# Patient Record
Sex: MALE | Race: WHITE | NOT HISPANIC OR LATINO | Employment: FULL TIME | URBAN - METROPOLITAN AREA
[De-identification: names, ages, dates, MRNs, and addresses within clinical notes are randomized per-mention and may not be internally consistent; named-entity substitution may affect disease eponyms.]

---

## 2017-02-06 ENCOUNTER — GENERIC CONVERSION - ENCOUNTER (OUTPATIENT)
Dept: OTHER | Facility: OTHER | Age: 67
End: 2017-02-06

## 2017-03-15 DIAGNOSIS — Z12.5 ENCOUNTER FOR SCREENING FOR MALIGNANT NEOPLASM OF PROSTATE: ICD-10-CM

## 2017-03-15 DIAGNOSIS — R91.1 SOLITARY PULMONARY NODULE: ICD-10-CM

## 2017-03-16 ENCOUNTER — APPOINTMENT (OUTPATIENT)
Dept: LAB | Facility: CLINIC | Age: 67
End: 2017-03-16
Payer: COMMERCIAL

## 2017-03-16 ENCOUNTER — TRANSCRIBE ORDERS (OUTPATIENT)
Dept: LAB | Facility: CLINIC | Age: 67
End: 2017-03-16

## 2017-03-16 DIAGNOSIS — Z12.5 ENCOUNTER FOR SCREENING FOR MALIGNANT NEOPLASM OF PROSTATE: ICD-10-CM

## 2017-03-16 LAB — PSA SERPL-MCNC: 1.2 NG/ML (ref 0–4)

## 2017-03-16 PROCEDURE — G0103 PSA SCREENING: HCPCS

## 2017-03-16 PROCEDURE — 36415 COLL VENOUS BLD VENIPUNCTURE: CPT

## 2017-03-20 ENCOUNTER — ALLSCRIPTS OFFICE VISIT (OUTPATIENT)
Dept: OTHER | Facility: OTHER | Age: 67
End: 2017-03-20

## 2017-04-05 ENCOUNTER — HOSPITAL ENCOUNTER (OUTPATIENT)
Dept: CT IMAGING | Facility: HOSPITAL | Age: 67
Discharge: HOME | End: 2017-04-05
Attending: THORACIC SURGERY (CARDIOTHORACIC VASCULAR SURGERY)
Payer: COMMERCIAL

## 2017-04-05 DIAGNOSIS — R91.1 SOLITARY PULMONARY NODULE: ICD-10-CM

## 2017-04-05 PROCEDURE — 71250 CT THORAX DX C-: CPT

## 2017-04-11 ENCOUNTER — ALLSCRIPTS OFFICE VISIT (OUTPATIENT)
Dept: OTHER | Facility: OTHER | Age: 67
End: 2017-04-11

## 2017-05-15 ENCOUNTER — GENERIC CONVERSION - ENCOUNTER (OUTPATIENT)
Dept: OTHER | Facility: OTHER | Age: 67
End: 2017-05-15

## 2017-07-28 ENCOUNTER — APPOINTMENT (OUTPATIENT)
Dept: LAB | Facility: CLINIC | Age: 67
End: 2017-07-28
Payer: COMMERCIAL

## 2017-07-28 ENCOUNTER — TRANSCRIBE ORDERS (OUTPATIENT)
Dept: LAB | Facility: CLINIC | Age: 67
End: 2017-07-28

## 2017-07-28 DIAGNOSIS — Z00.8 HEALTH EXAMINATION IN POPULATION SURVEY: Primary | ICD-10-CM

## 2017-07-28 DIAGNOSIS — Z00.8 HEALTH EXAMINATION IN POPULATION SURVEY: ICD-10-CM

## 2017-07-28 LAB
CHOLEST SERPL-MCNC: 190 MG/DL (ref 50–200)
EST. AVERAGE GLUCOSE BLD GHB EST-MCNC: 117 MG/DL
HBA1C MFR BLD: 5.7 % (ref 4.2–6.3)
HDLC SERPL-MCNC: 54 MG/DL (ref 40–60)
LDLC SERPL CALC-MCNC: 117 MG/DL (ref 0–100)
TRIGL SERPL-MCNC: 97 MG/DL

## 2017-07-28 PROCEDURE — 36415 COLL VENOUS BLD VENIPUNCTURE: CPT

## 2017-07-28 PROCEDURE — 80061 LIPID PANEL: CPT

## 2017-07-28 PROCEDURE — 83036 HEMOGLOBIN GLYCOSYLATED A1C: CPT

## 2017-07-30 ENCOUNTER — APPOINTMENT (EMERGENCY)
Dept: CT IMAGING | Facility: HOSPITAL | Age: 67
End: 2017-07-30
Payer: COMMERCIAL

## 2017-07-30 ENCOUNTER — HOSPITAL ENCOUNTER (EMERGENCY)
Facility: HOSPITAL | Age: 67
Discharge: HOME | End: 2017-07-30
Attending: EMERGENCY MEDICINE
Payer: COMMERCIAL

## 2017-07-30 VITALS
BODY MASS INDEX: 26.81 KG/M2 | SYSTOLIC BLOOD PRESSURE: 132 MMHG | DIASTOLIC BLOOD PRESSURE: 85 MMHG | OXYGEN SATURATION: 95 % | HEIGHT: 72 IN | RESPIRATION RATE: 18 BRPM | HEART RATE: 62 BPM | TEMPERATURE: 97.4 F | WEIGHT: 197.97 LBS

## 2017-07-30 DIAGNOSIS — N20.0 KIDNEY STONE ON LEFT SIDE: ICD-10-CM

## 2017-07-30 DIAGNOSIS — N20.1 URETEROLITHIASIS: ICD-10-CM

## 2017-07-30 DIAGNOSIS — N28.9 ACUTE RENAL INSUFFICIENCY: ICD-10-CM

## 2017-07-30 DIAGNOSIS — N23 RENAL COLIC ON RIGHT SIDE: Primary | ICD-10-CM

## 2017-07-30 LAB
ALBUMIN SERPL BCP-MCNC: 4 G/DL (ref 3.5–5)
ALP SERPL-CCNC: 88 U/L (ref 46–116)
ALT SERPL W P-5'-P-CCNC: 23 U/L (ref 12–78)
ANION GAP SERPL CALCULATED.3IONS-SCNC: 12 MMOL/L (ref 4–13)
AST SERPL W P-5'-P-CCNC: 18 U/L (ref 5–45)
BACTERIA UR QL AUTO: ABNORMAL /HPF
BASOPHILS # BLD AUTO: 0.02 THOUSANDS/ΜL (ref 0–0.1)
BASOPHILS NFR BLD AUTO: 0 % (ref 0–1)
BILIRUB SERPL-MCNC: 1 MG/DL (ref 0.2–1)
BILIRUB UR QL STRIP: NEGATIVE
BUN SERPL-MCNC: 19 MG/DL (ref 5–25)
CALCIUM SERPL-MCNC: 9.5 MG/DL (ref 8.3–10.1)
CHLORIDE SERPL-SCNC: 104 MMOL/L (ref 100–108)
CLARITY UR: CLEAR
CO2 SERPL-SCNC: 24 MMOL/L (ref 21–32)
COLOR UR: YELLOW
CREAT SERPL-MCNC: 1.47 MG/DL (ref 0.6–1.3)
EOSINOPHIL # BLD AUTO: 0 THOUSAND/ΜL (ref 0–0.61)
EOSINOPHIL NFR BLD AUTO: 0 % (ref 0–6)
ERYTHROCYTE [DISTWIDTH] IN BLOOD BY AUTOMATED COUNT: 13.9 % (ref 11.6–15.1)
GFR SERPL CREATININE-BSD FRML MDRD: 50 ML/MIN/1.73SQ M
GLUCOSE SERPL-MCNC: 116 MG/DL (ref 65–140)
GLUCOSE UR STRIP-MCNC: NEGATIVE MG/DL
HCT VFR BLD AUTO: 46.5 % (ref 36.5–49.3)
HGB BLD-MCNC: 15.6 G/DL (ref 12–17)
HGB UR QL STRIP.AUTO: ABNORMAL
KETONES UR STRIP-MCNC: ABNORMAL MG/DL
LEUKOCYTE ESTERASE UR QL STRIP: NEGATIVE
LYMPHOCYTES # BLD AUTO: 1.19 THOUSANDS/ΜL (ref 0.6–4.47)
LYMPHOCYTES NFR BLD AUTO: 10 % (ref 14–44)
MCH RBC QN AUTO: 28.5 PG (ref 26.8–34.3)
MCHC RBC AUTO-ENTMCNC: 33.5 G/DL (ref 31.4–37.4)
MCV RBC AUTO: 85 FL (ref 82–98)
MONOCYTES # BLD AUTO: 0.77 THOUSAND/ΜL (ref 0.17–1.22)
MONOCYTES NFR BLD AUTO: 6 % (ref 4–12)
MUCOUS THREADS UR QL AUTO: ABNORMAL
NEUTROPHILS # BLD AUTO: 10.31 THOUSANDS/ΜL (ref 1.85–7.62)
NEUTS SEG NFR BLD AUTO: 84 % (ref 43–75)
NITRITE UR QL STRIP: NEGATIVE
NON-SQ EPI CELLS URNS QL MICRO: ABNORMAL /HPF
PH UR STRIP.AUTO: 5 [PH] (ref 4.5–8)
PLATELET # BLD AUTO: 270 THOUSANDS/UL (ref 149–390)
PMV BLD AUTO: 10.6 FL (ref 8.9–12.7)
POTASSIUM SERPL-SCNC: 4.2 MMOL/L (ref 3.5–5.3)
PROT SERPL-MCNC: 7.8 G/DL (ref 6.4–8.2)
PROT UR STRIP-MCNC: NEGATIVE MG/DL
RBC # BLD AUTO: 5.47 MILLION/UL (ref 3.88–5.62)
RBC #/AREA URNS AUTO: ABNORMAL /HPF
SODIUM SERPL-SCNC: 140 MMOL/L (ref 136–145)
SP GR UR STRIP.AUTO: >=1.03 (ref 1–1.03)
UROBILINOGEN UR QL STRIP.AUTO: 0.2 E.U./DL
WBC # BLD AUTO: 12.29 THOUSAND/UL (ref 4.31–10.16)
WBC #/AREA URNS AUTO: ABNORMAL /HPF

## 2017-07-30 PROCEDURE — 80053 COMPREHEN METABOLIC PANEL: CPT | Performed by: EMERGENCY MEDICINE

## 2017-07-30 PROCEDURE — 96375 TX/PRO/DX INJ NEW DRUG ADDON: CPT

## 2017-07-30 PROCEDURE — 36415 COLL VENOUS BLD VENIPUNCTURE: CPT | Performed by: EMERGENCY MEDICINE

## 2017-07-30 PROCEDURE — 85025 COMPLETE CBC W/AUTO DIFF WBC: CPT | Performed by: EMERGENCY MEDICINE

## 2017-07-30 PROCEDURE — 96361 HYDRATE IV INFUSION ADD-ON: CPT

## 2017-07-30 PROCEDURE — 81001 URINALYSIS AUTO W/SCOPE: CPT | Performed by: EMERGENCY MEDICINE

## 2017-07-30 PROCEDURE — 74177 CT ABD & PELVIS W/CONTRAST: CPT

## 2017-07-30 PROCEDURE — 96374 THER/PROPH/DIAG INJ IV PUSH: CPT

## 2017-07-30 PROCEDURE — 99284 EMERGENCY DEPT VISIT MOD MDM: CPT

## 2017-07-30 RX ORDER — OXYCODONE HYDROCHLORIDE AND ACETAMINOPHEN 5; 325 MG/1; MG/1
1 TABLET ORAL EVERY 6 HOURS PRN
Qty: 20 TABLET | Refills: 0 | Status: SHIPPED | OUTPATIENT
Start: 2017-07-30 | End: 2017-08-09

## 2017-07-30 RX ORDER — KETOROLAC TROMETHAMINE 30 MG/ML
15 INJECTION, SOLUTION INTRAMUSCULAR; INTRAVENOUS ONCE
Status: COMPLETED | OUTPATIENT
Start: 2017-07-30 | End: 2017-07-30

## 2017-07-30 RX ORDER — ONDANSETRON 4 MG/1
4 TABLET, FILM COATED ORAL EVERY 8 HOURS PRN
Qty: 15 TABLET | Refills: 0 | Status: SHIPPED | OUTPATIENT
Start: 2017-07-30 | End: 2019-06-28

## 2017-07-30 RX ORDER — ONDANSETRON 2 MG/ML
4 INJECTION INTRAMUSCULAR; INTRAVENOUS ONCE
Status: COMPLETED | OUTPATIENT
Start: 2017-07-30 | End: 2017-07-30

## 2017-07-30 RX ORDER — TAMSULOSIN HYDROCHLORIDE 0.4 MG/1
0.4 CAPSULE ORAL
Qty: 7 CAPSULE | Refills: 0 | Status: SHIPPED | OUTPATIENT
Start: 2017-07-30 | End: 2019-08-20

## 2017-07-30 RX ORDER — MULTIVITAMIN
1 TABLET ORAL DAILY
COMMUNITY

## 2017-07-30 RX ORDER — MORPHINE SULFATE 4 MG/ML
4 INJECTION, SOLUTION INTRAMUSCULAR; INTRAVENOUS ONCE
Status: COMPLETED | OUTPATIENT
Start: 2017-07-30 | End: 2017-07-30

## 2017-07-30 RX ADMIN — MORPHINE SULFATE 4 MG: 4 INJECTION, SOLUTION INTRAMUSCULAR; INTRAVENOUS at 18:58

## 2017-07-30 RX ADMIN — ONDANSETRON 4 MG: 2 INJECTION INTRAMUSCULAR; INTRAVENOUS at 18:59

## 2017-07-30 RX ADMIN — KETOROLAC TROMETHAMINE 15 MG: 30 INJECTION, SOLUTION INTRAMUSCULAR at 18:59

## 2017-07-30 RX ADMIN — SODIUM CHLORIDE 1000 ML: 0.9 INJECTION, SOLUTION INTRAVENOUS at 19:06

## 2017-07-30 RX ADMIN — IOHEXOL 100 ML: 350 INJECTION, SOLUTION INTRAVENOUS at 19:54

## 2017-08-08 ENCOUNTER — ALLSCRIPTS OFFICE VISIT (OUTPATIENT)
Dept: OTHER | Facility: OTHER | Age: 67
End: 2017-08-08

## 2017-08-08 ENCOUNTER — LAB REQUISITION (OUTPATIENT)
Dept: LAB | Facility: HOSPITAL | Age: 67
End: 2017-08-08
Payer: COMMERCIAL

## 2017-08-08 DIAGNOSIS — Z87.442 PERSONAL HISTORY OF URINARY CALCULI: ICD-10-CM

## 2017-08-08 DIAGNOSIS — K76.0 FATTY (CHANGE OF) LIVER, NOT ELSEWHERE CLASSIFIED: ICD-10-CM

## 2017-08-08 PROCEDURE — 82360 CALCULUS ASSAY QUANT: CPT | Performed by: INTERNAL MEDICINE

## 2017-08-21 LAB
CA PHOS MFR STONE: 3 %
COLOR STONE: NORMAL
COM MFR STONE: 97 %
COMMENT-STONE3: NORMAL
COMPOSITION: NORMAL
LABORATORY COMMENT REPORT: NORMAL
NIDUS STONE QL: NORMAL
PHOTO: NORMAL
SIZE STONE: NORMAL MM
STONE ANALYSIS-IMP: NORMAL
WT STONE: 6 MG

## 2017-08-25 ENCOUNTER — GENERIC CONVERSION - ENCOUNTER (OUTPATIENT)
Dept: OTHER | Facility: OTHER | Age: 67
End: 2017-08-25

## 2018-01-09 NOTE — RESULT NOTES
Message   blood test results are back  vitamin D is in same range at 27 5 (normal is 30 or greater)  if patient has been taking 2,000 IU vitamin D over the counter daily - recommend to increase dose to 4,000 IU daily for next 3 months   kidney, electrolyte blood test results look fine  cholesterol improved to 184 & diabetes screening test/HGA1C really improved  HGA1C was 6 8% & is now 5 4%! recommend to continue with current treatment plan & keep it up with the exercise!  let me know if questions     Verified Results  (1) VITAMIN D 25-HYDROXY 14Dxg1884 11:14AM Monik Reyes   TW Order Number: [id number]  TW Order Number: [id number]     Test Name Result Flag Reference   VIT D 25-HYDROX 27 5 ng/mL L 30 0-100 0   This assay is a certified procedure of the CDC Vitamin D Standardization Certification Program (VDSCP)     Deficiency <20ng/ml   Insufficiency 20-30ng/ml   Sufficient  ng/ml     *Patients undergoing fluorescein dye angiography may retain small amounts of fluorescein in the body for 48-72 hours post procedure  Samples containing fluorescein can produce falsely elevated Vitamin D values  If the patient had this procedure, a specimen should be resubmitted post fluorescein clearance  (1) BASIC METABOLIC PROFILE 51CCW0694 11:14AM Shanna Dee Order Number: [id number]  TW Order Number: [id number] Order Number: [id number]     Test Name Result Flag Reference   GLUCOSE,RANDM 102 mg/dL     If the patient is fasting, the ADA then defines impaired fasting glucose as > 100 mg/dL and diabetes as > or equal to 123 mg/dL     SODIUM 138 mmol/L  136-145   POTASSIUM 4 5 mmol/L  3 5-5 3   CHLORIDE 104 mmol/L  100-108   CARBON DIOXIDE 28 mmol/L  21-32   ANION GAP (CALC) 6 mmol/L  4-13   BLOOD UREA NITROGEN 13 mg/dL  5-25   CREATININE 0 93 mg/dL  0 60-1 30   Standardized to IDMS reference method   CALCIUM 9 2 mg/dL  8 3-10 1   eGFR Non- >60 0 ml/min/1 73sq m   [facility] Disease Education Program recommendations are as follows:  GFR calculation is accurate only with a steady state creatinine  Chronic Kidney disease less than 60 ml/min/1 73 sq  meters  Kidney failure less than 15 ml/min/1 73 sq  meters  (1) LIPID PANEL, FASTING 67MUK6093 11:14AM Veronica Vega    Order Number: [id number]  TW Order Number: [id number] Order Number: [id number]     Test Name Result Flag Reference   CHOLESTEROL 184 mg/dL     HDL,DIRECT 52 mg/dL  40-60   Specimen collection should occur prior to Metamizole administration due to the potential for falsely depressed results  LDL CHOLESTEROL CALCULATED 109 mg/dL H 0-100   Triglyceride:         Normal              <150 mg/dl       Borderline High    150-199 mg/dl       High               200-499 mg/dl       Very High          >499 mg/dl  Cholesterol:         Desirable        <200 mg/dl      Borderline High  200-239 mg/dl      High             >239 mg/dl  HDL Cholesterol:        High    >59 mg/dL      Low     <41 mg/dL  LDL CALCULATED:    This screening LDL is a calculated result  It does not have the accuracy of the Direct Measured LDL in the monitoring of patients with hyperlipidemia and/or statin therapy  Direct Measure LDL (PKO635) must be ordered separately in these patients  TRIGLYCERIDES 114 mg/dL  <=150   Specimen collection should occur prior to N-Acetylcysteine or Metamizole administration due to the potential for falsely depressed results  (1) HEMOGLOBIN A1C 23Tqp9637 11:14AM Veronica Vega    Order Number: [id number]  TW Order Number: [id number]     Test Name Result Flag Reference   HEMOGLOBIN A1C 5 4 %  4 2-6 3   EST  AVG   GLUCOSE 108 mg/dl

## 2018-01-10 NOTE — MISCELLANEOUS
Message  pt's wife arrived for appt today in office - was exposed to scabies and having symptoms/pruritic rash on hands    wife requested that her  and son be treated(who are both patients of mine) as she resides with them and may have exposed them as well    plan - permethrin ordered, discussed infectio control practices as well with Sheree Wang      Plan  Exposure to scabies    · Permethrin 5 % External Cream; MASSAGE INTO SKIN FROM HEAD TO SOLES  OF FEET  [address] OFF AFTER 8-14 HOURS  REPEAT IN 1 WEEK    Signatures   Electronically signed by : Js Blackburn DO; Feb 6 2017  4:45PM EST                       (Author)

## 2018-01-11 NOTE — PROGRESS NOTES
Assessment    1  Encounter for preventive health examination (V70 0) (Z00 00)   2  Vitamin D deficiency (268 9) (E55 9)   3  Participant in health and wellness plan (V49 89) (Z78 9)   4  Screen for colon cancer (V76 51) (Z12 11)    Plan   Health Maintenance    · We encourage all of our patients to exercise regularly  30 minutes of exercise or physical  activity five or more days a week is recommended for children and adults ;  Status:Complete;   Done: 16CFB0842   · 1 - Екатерина Stein RD, Marianela Simons (Dietitian) Physician Referral  Consult  Status: Canceled -  Scheduling  Care Summary provided  : Yes   · (1) BASIC METABOLIC PROFILE; Status:Resulted - Requires Verification;   Done:  48FEX0944 11:14AM  Health Maintenance, Participant in health and wellness plan    · (1) LIPID PANEL, FASTING; Status:Resulted - Requires Verification;   Done: 46WGI5966  11:14AM  Participant in health and wellness plan    · (1) HEMOGLOBIN A1C; Status:Resulted - Requires Verification;   Done: 45BKW1903  11:14AM  Screen for colon cancer    · COLONOSCOPY; Status:Active; Requested for:12Rxt5948;   Vitamin D deficiency    · (1) VITAMIN D 25-HYDROXY; Status:Resulted - Requires Verification;   Done: 17XMY9396  11:14AM    2 - *[facility] ( COLORECTAL SURGERY, GASTROENTEROLOGY) Physician Referral  Consult - 64 y/o M never had colonoscopy   screen for colon cancer  Status: Hold For - Scheduling  Requested for: 81VCB4688  Ordered; For: Screen for colon cancer;  Ordered By: Mateo Mata  Performed:   Due: 16RKM5631     Discussion/Summary  Impression: health maintenance visit  Currently, he eats a healthy diet and has an adequate exercise regimen  Prostate cancer screening: prostate cancer screening is current  Colorectal cancer screening: colonoscopy has been ordered  Screening lab work includes glucose, lipid profile and 25-hydroxyvitamin D  The patient declines immunizations  Patient discussion: discussed with the patient       1  fasting blood work  2  keep up good work on [employer] loss/exercise and lifestyle changes  3  return for annual check up once a year or sooner if questions  4  colonoscopy  5  recommend shingles vaccine, let me know if you would like to receive this vaccine  The patient was counseled regarding instructions for management, patient and family education, impressions  Chief Complaint  patient is here for a wellness visit  History of Present Illness  HM, Adult Male: The patient is being seen for a health maintenance evaluation  The last health maintenance visit was 1 year(s) ago  Social History: Household members include spouse  He is   Work status: working full time and occupation:   The patient has never smoked cigarettes  The patient has no concerns about alcohol abuse  General Health: The patient's health since the last visit is described as good  He denies vision problems  He denies hearing loss  Immunizations status: not up to date The patient needs the following immunization(s): zoster vaccine  Lifestyle:  He consumes a diverse and healthy diet  He does not have any weight concerns  He exercises regularly  He exercises 3 or more times per week for 20-25 minutes per session  Exercise includes running/jogging  Screening: cancer screening reviewed and updated  metabolic screening reviewed and updated  HPI: 64 y/o M new to me here for annual check up/physical    as above - takes no medications except OTC vitamins  denies any past medical hx  running several times per week for exercise & modified diet as well - lost 20+ lbs in recent months!  declines shingles vaccine and flu vaccine - "I don't get sick"    never had colonoscopy - willing to proceed with referral to complete  history of lung nodule - s/p Thoracic surgery eval  due for f/u CT chest next year - patient aware    denies any other complaints      Review of Systems    Constitutional: no fever  Cardiovascular: no chest pain  Respiratory: no shortness of breath  Gastrointestinal: no abdominal pain  Genitourinary: no incontinence  Psychiatric: no depression  ROS reviewed  Active Problems    1  Benign prostatic hypertrophy (600 00) (N40 0)   2  Impacted cerumen of left ear (380 4) (H61 22)   3  Lung nodule (793 11) (R91 1)   4  Migraine headache (346 90) (G43 909)   5  Special screening examination for neoplasm of prostate (V76 44) (Z12 5)    Past Medical History    · History of renal calculi (V13 01) (N28 433)    Surgical History    · History of Biopsy Lymph Node   · History of Lithotomy   · History of Lymphatic Surgery    Family History  Mother    · Family history of Colon cancer   · Family history of migraine headaches (V17 2) (Z82 0)  Father    · Family history of lung cancer (V16 1) (Z80 1)  Family History    · Denied: Family history of cardiac disorder   · Denied: Family history of diabetes mellitus    Social History    · Alcohol use (V49 89) (Z78 9)   · Occasionally beer 1x per week   · Currently working   ·    · Denied: Drug use (305 90) (F19 90)   · Exercises moderately less than 3 times a week   ·    · Never a smoker    Current Meds   1  Mens 50+ Multi Vitamin/Min Oral Tablet; Take 1 tablet daily Recorded   2  Vitamin B-12 1000 MCG Oral Tablet; TAKE 3 TABLET Daily; Therapy: (Bj Worrell) to Recorded   3  Vitamin D3 2000 UNIT Oral Capsule; Takes 1 capsule daily; Therapy: (Recorded:76Lyu0436) to Recorded    Allergies    1  No Known Drug Allergies    Vitals   Recorded: 28Iyt9801 10:37AM   Temperature 97 9 F   Heart Rate 75   Respiration 18   Systolic 951   Diastolic 68   Height 6 ft    Weight 198 lb    BMI Calculated 26 85   BSA Calculated 2 12   O2 Saturation 98     Physical Exam    Constitutional   General appearance: No acute distress, well appearing and well nourished  Eyes   Pupils and irises: Equal, round, reactive to light      Ears, Nose, Mouth, and Throat   Otoscopic examination: Tympanic membranes translucent with normal light reflex  Canals patent without erythema  mild cerumen in Right ear canal without impaction  Oropharynx: Normal with no erythema, edema, exudate or lesions  Pulmonary   Respiratory effort: No increased work of breathing or signs of respiratory distress  Auscultation of lungs: Clear to auscultation  Cardiovascular   Auscultation of heart: Normal rate and rhythm, normal S1 and S2, no murmurs  Examination of extremities for edema and/or varicosities: Normal   no edema  Abdomen   Abdomen: Non-tender, no masses  Lymphatic   Palpation of lymph nodes in neck: No lymphadenopathy  Psychiatric   Judgment and insight: Normal     Mood and affect: Normal        Results/Data  (1) VITAMIN D 25-HYDROXY 96Cki7832 11:14AM Aspen Echevarria    Order Number: [id number]  TW Order Number: [id number]     Test Name Result Flag Reference   VIT D 25-HYDROX 27 5 ng/mL L 30 0-100 0   This assay is a certified procedure of the CDC Vitamin D Standardization Certification Program (VDSCP)     Deficiency <20ng/ml   Insufficiency 20-30ng/ml   Sufficient  ng/ml     *Patients undergoing fluorescein dye angiography may retain small amounts of fluorescein in the body for 48-72 hours post procedure  Samples containing fluorescein can produce falsely elevated Vitamin D values  If the patient had this procedure, a specimen should be resubmitted post fluorescein clearance  (1) BASIC METABOLIC PROFILE 89NKN1033 11:14AM Jesus Manuel Robert Order Number: [id number]  TW Order Number: [id number] Order Number: [id number]     Test Name Result Flag Reference   GLUCOSE,RANDM 102 mg/dL     If the patient is fasting, the ADA then defines impaired fasting glucose as > 100 mg/dL and diabetes as > or equal to 123 mg/dL     SODIUM 138 mmol/L  136-145   POTASSIUM 4 5 mmol/L  3 5-5 3   CHLORIDE 104 mmol/L  100-108   CARBON DIOXIDE 28 mmol/L  21-32   ANION GAP (CALC) 6 mmol/L  4-13   BLOOD UREA NITROGEN 13 mg/dL  5-25   CREATININE 0 93 mg/dL  0 60-1 30   Standardized to IDMS reference method   CALCIUM 9 2 mg/dL  8 3-10 1   eGFR Non-African American      >60 0 ml/min/1 73sq m   [facility] Disease Education Program recommendations are as follows:  GFR calculation is accurate only with a steady state creatinine  Chronic Kidney disease less than 60 ml/min/1 73 sq  meters  Kidney failure less than 15 ml/min/1 73 sq  meters  (1) LIPID PANEL, FASTING 11MEA3230 11:14AM Lynn Lugo    Order Number: [id number]  TW Order Number: [id number] Order Number: [id number]     Test Name Result Flag Reference   CHOLESTEROL 184 mg/dL     HDL,DIRECT 52 mg/dL  40-60   Specimen collection should occur prior to Metamizole administration due to the potential for falsely depressed results  LDL CHOLESTEROL CALCULATED 109 mg/dL H 0-100   Triglyceride:         Normal              <150 mg/dl       Borderline High    150-199 mg/dl       High               200-499 mg/dl       Very High          >499 mg/dl  Cholesterol:         Desirable        <200 mg/dl      Borderline High  200-239 mg/dl      High             >239 mg/dl  HDL Cholesterol:        High    >59 mg/dL      Low     <41 mg/dL  LDL CALCULATED:    This screening LDL is a calculated result  It does not have the accuracy of the Direct Measured LDL in the monitoring of patients with hyperlipidemia and/or statin therapy  Direct Measure LDL (BNV737) must be ordered separately in these patients  TRIGLYCERIDES 114 mg/dL  <=150   Specimen collection should occur prior to N-Acetylcysteine or Metamizole administration due to the potential for falsely depressed results  (1) HEMOGLOBIN A1C 09Iwg9744 11:14AM Lynn Lugo   TW Order Number: [id number]  TW Order Number: [id number]     Test Name Result Flag Reference   HEMOGLOBIN A1C 5 4 %  4 2-6 3   EST  AVG  GLUCOSE 108 mg/dl       PHQ-2 Adult Depression Screening 86Lpz1027 10:58AM Adelfo Olson     Test Name Result Flag Reference   PHQ-2 Adult Depression Score 0     Over the last two weeks, how often have you been bothered by any of the following problems?   Little interest or pleasure in doing things: Not at all - 0  Feeling down, depressed, or hopeless: Not at all - 0   PHQ-2 Adult Depression Screening Negative         Future Appointments    Date/Time Provider Specialty Site   07/21/2017 08:00 AM Adelfo Olson DO Internal Medicine [facility] INTERNAL MED   03/08/2017 10:00 AM Cindy Banks, [address] [facility] Urology [address] [place] [address] Extension     Signatures   Electronically signed by : Natasha Lira DO; Jul 15 2016  4:38PM EST                       (Author)

## 2018-01-13 VITALS
SYSTOLIC BLOOD PRESSURE: 122 MMHG | DIASTOLIC BLOOD PRESSURE: 78 MMHG | BODY MASS INDEX: 27.22 KG/M2 | OXYGEN SATURATION: 97 % | TEMPERATURE: 95.7 F | WEIGHT: 201 LBS | HEIGHT: 72 IN | HEART RATE: 61 BPM

## 2018-01-14 VITALS
WEIGHT: 202.13 LBS | HEART RATE: 72 BPM | HEIGHT: 72 IN | SYSTOLIC BLOOD PRESSURE: 130 MMHG | BODY MASS INDEX: 27.38 KG/M2 | DIASTOLIC BLOOD PRESSURE: 84 MMHG

## 2018-01-16 NOTE — RESULT NOTES
Verified Results  (1) CALCULI, RENAL 22Bil1888 08:08PM Bernardo Neighbor     Test Name Result Flag Reference   COLOR Ozzy Sutherland     SIZE 2x2x2 mm     STONE WEIGHT 6 0 mg     COMPOSITION Comment     Percentage (Represents the % composition)   CA OXALATE,MONOHYDR  97 %     CALCIUM PHOSPHATE 03 %     NIDUS Comment     Nidus composed of Calcium oxalate monohydrate  PLEASE NOTE Comment     Calculi report with photograph will follow via computer, mail or   delivery  COMMENT-STONE3 Comment     Physician questions regarding Calculi Analysis contact [facility] at:  [phone]  PHOTO Comment     Photograph will follow under separate cover  Performed at:  [address]  [id number]  : Robel Connolly MD, Phone:  [phone]   RENAL STONE DISCLAIMER Comment     This test was developed and its performance characteristics  determined by [facility]  It has not been cleared or approved  by the Food and Drug Administration

## 2018-01-16 NOTE — MISCELLANEOUS
Message   Recorded as Task   Date: 05/15/2017 08:41 AM, Created By: Reynaldo Pierre   Task Name: Medical Complaint Callback   Assigned To: Tommy Hermosillo   Regarding Patient: Ifeanyi Howard, Status: In Progress   Comment:    Janina Jreez - 15 May 2017 8:41 AM     TASK CREATED  Caller: NE, Spouse; Medical Complaint  PT 'S WIFE WAS SEEN IN THE ER AND DIAGNOSED WITH SCABIES  WOULD LIKE MED ALSO  CAN Maritza Suazo- [phone]   Tommy Hermosillo - 15 May 2017 10:54 AM     TASK REPLIED TO: Previously Assigned To [employer],Team  if Palmira Whyte lives with Hortencia Oconnor, reasonable to treat damari    which pharmacy should I send to  Merrill Nolasco - 15 May 2017 12:16 PM     TASK IN PROGRESS   Ally Garcia - 15 May 2017 12:18 PM     TASK EDITED  Noah Client        Plan  Exposure to scabies    · From  Permethrin 5 % External Cream MASSAGE INTO SKIN FROM HEAD  TO SOLES OF FEET  [address] OFF AFTER 8-14 HOURS  REPEAT IN 1 WEEK To  Permethrin 5 % External Cream MASSAGE INTO SKIN FROM HEAD TO SOLES OF  FEET  [address] OFF AFTER 8-14 HOURS    Signatures   Electronically signed by : Willy Guevara DO; May 15 2017 12:28PM EST                       (Author)

## 2018-01-17 NOTE — PROGRESS NOTES
Assessment    1  Encounter for preventive health examination (V70 0) (Z00 00)   2  Fatty liver (571 8) (K76 0)   · seen on CT in ER 7/2017   3  Impacted cerumen of right ear (380 4) (H61 21)   4  History of renal calculi (V13 01) (Z87 442)    Plan   Fatty liver    · (1) HEPATIC FUNCTION PANEL; Status:Canceled;   PMH: History of renal calculi    · (1) CALCULI, RENAL; Status:Active; Requested for:12Vts7598;     2 - *EYVAZ&REILLYCOL ( COLORECTAL SURGERY, GASTROENTEROLOGY) Co-Management  62 y/o M never had colonoscopy - for screening colonoscopy  Status: Hold For - Scheduling  Requested for: 21Aia9708  Ordered; For: Screen for colon cancer;  Ordered By: Taye Najera  Performed:   Due: 38INJ1024     Discussion/Summary  Currently, he eats a healthy diet and has an adequate exercise regimen  Prostate cancer screening: prostate cancer screening is current  Colorectal cancer screening: the risks and benefits of colorectal cancer screening were discussed and colonoscopy has been ordered  Screening lab work includes glucose, lipid profile and labs already done  The risks and benefits of immunizations were discussed and patient declines immunizations  Advice and education were given regarding nutrition and weight loss  Patient discussion: discussed with the patient  The patient was counseled regarding instructions for management, patient and family education, impressions  Possible side effects of new medications were reviewed with the patient/guardian today  The treatment plan was reviewed with the patient/guardian  The patient/guardian understands and agrees with the treatment plan      Chief Complaint  Pt is here for a physical      History of Present Illness  HM, Adult Male: The patient is being seen for a health maintenance evaluation  The last health maintenance visit was 1 year(s) ago  Social History: Household members include spouse and adult children  He is   Work status: working full time   The patient has never smoked cigarettes  General Health: The patient's health since the last visit is described as good  He denies vision problems  He denies hearing loss  Immunizations status: not up to date   pt declined all vaccines  Lifestyle:  He consumes a diverse and healthy diet  He does not have any weight concerns  He exercises regularly  He does not use tobacco    Screening: cancer screening reviewed and updated  metabolic screening reviewed and current  HPI: 60 y/o M here for annual physical    as above - takes no medications on regular basis except vitamins    lost weight over last 1-2 years, has been eating better and exercising, runs few days per week at least for 20-30 mins each time    declines all vaccines including shingles and future pneumonia vaccines(after age of 72)  had wellness BW which we reviewed    hx of fatty liver - seen on CT in 2015 and again last month when he went to [address] [place] [address] for flank pain and dx'd kidney stone  fatty liver has improved from previous CT in 2015 per radiology report  he passed stone few days ago and is feeling much better  he saved the stone and has never had previous stones(1x previous) analyzed for type of nephrolithiasis    working FT as   never had colonoscopy - was referred last year but he did not go, we discussed risk/benefit of colonoscopy again today including mortality benefit and he agreed to call for appt to meet provider who completes the test    no other concerns or complaints      Review of Systems    Constitutional: no fever  Eyes: no eyesight problems  ENT: no sore throat  Cardiovascular: no chest pain  Respiratory: no shortness of breath  Gastrointestinal: no abdominal pain  Genitourinary: no dysuria  Musculoskeletal: no arthralgias  Integumentary: no rashes  Neurological: no confusion  Psychiatric: no depression  Endocrine: no feelings of weakness  ROS reviewed  Active Problems    1   Benign prostatic hypertrophy (600 00) (N40 0)   2  Encounter for special screening examination for neoplasm of prostate (V76 44) (Z12 5)   3  Exposure to scabies (V01 89) (Z20 89)   4  Fatty liver (571 8) (K76 0)   5  Lung nodule (793 11) (R91 1)   6  Migraine headache (346 90) (G43 909)   7  Participant in health and wellness plan (V49 89) (Z78 9)   8  Screen for colon cancer (V76 51) (Z12 11)   9  Special screening examination for neoplasm of prostate (V76 44) (Z12 5)   10  Vitamin D deficiency (268 9) (E55 9)    Past Medical History    · History of renal calculi (V13 01) (W83 954)    Surgical History    · History of Biopsy Lymph Node   · History of Lithotomy   · History of Lymphatic Surgery    Family History  Mother    · Family history of Colon cancer   · Family history of migraine headaches (V17 2) (Z82 0)  Father    · Family history of lung cancer (V16 1) (Z80 1)  Family History    · Denied: Family history of cardiac disorder   · Denied: Family history of diabetes mellitus    Social History    · Currently working   ·    · Denied: Drug use (305 90) (F19 90)   · Exercises moderately less than 3 times a week   ·    · Never a smoker   · Occasional alcohol use    Current Meds   1  Co Q 10 100 MG Oral Capsule Recorded   2  Mens 50+ Multi Vitamin/Min Oral Tablet; Take 1 tablet daily Recorded   3  Vitamin B-12 1000 MCG Oral Tablet; TAKE 3 TABLET Daily; Therapy: ([phone]) to Recorded   4  Vitamin D3 2000 UNIT Oral Capsule; Takes 1 capsule daily; Therapy: (Recorded:58Ejb5854) to Recorded    Allergies    1  No Known Drug Allergies    Vitals   Recorded: 35Lwa3520 08:56AM   Temperature 98 4 F   Heart Rate 84   Respiration 18   Systolic 587   Diastolic 80   Height 6 ft    Weight 200 lb 6 oz   BMI Calculated 27 18   BSA Calculated 2 13   O2 Saturation 98     Physical Exam    Constitutional   General appearance: No acute distress, well appearing and well nourished      Eyes   Pupils and irises: Equal, round, reactive to light     Ears, Nose, Mouth, and Throat   Otoscopic examination: Abnormal   The right tympanic membrane was obscured completely by cerumen  The left tympanic membrane was normal  The right external canal was normal  The left external canal was normal    Oropharynx: Normal with no erythema, edema, exudate or lesions  Pulmonary   Respiratory effort: No increased work of breathing or signs of respiratory distress  Auscultation of lungs: Clear to auscultation  Cardiovascular   Auscultation of heart: Normal rate and rhythm, normal S1 and S2, no murmurs  Examination of extremities for edema and/or varicosities: Normal     Abdomen   Abdomen: Non-tender, no masses  Psychiatric   Judgment and insight: Normal     Mood and affect: Normal        Results/Data  PHQ-2 Adult Depression Screening [id number] 08:59AM User, Grasprs     Test Name Result Flag Reference   PHQ-2 Adult Depression Score 0     Over the last two weeks, how often have you been bothered by any of the following problems? Little interest or pleasure in doing things: Not at all - 0  Feeling down, depressed, or hopeless: Not at all - 0   PHQ-2 Adult Depression Screening Negative       (1) HEMOGLOBIN A1C 08Tug5205 10:34AM Williams Ganser     Test Name Result Flag Reference   HEMOGLOBIN A1C 5 7 %  4 2-6 3   EST  AVG  GLUCOSE 117 mg/dl       (1) HEMOGLOBIN A1C 12VOE4808 10:34AM Williams Ganser     Test Name Result Flag Reference   HEMOGLOBIN A1C 5 7 %  4 2-6 3   EST  AVG  GLUCOSE 117 mg/dl       (1) LIPID PANEL, FASTING 09Ysb3804 10:34AM Williams Ganser     Test Name Result Flag Reference   CHOLESTEROL 190 mg/dL     HDL,DIRECT 54 mg/dL  40-60   Specimen collection should occur prior to Metamizole administration due to the potential for falsely depressed results     LDL CHOLESTEROL CALCULATED 117 mg/dL H 0-100   This is a fasting blood test  Water,black tea or black  coffee only after 9:00pm the night before test  Drink 2 glasses of water the morning of test         Triglyceride:         Normal              <150 mg/dl       Borderline High    150-199 mg/dl       High               200-499 mg/dl       Very High          >499 mg/dl  Cholesterol:         Desirable        <200 mg/dl      Borderline High  200-239 mg/dl      High             >239 mg/dl  HDL Cholesterol:        High    >59 mg/dL      Low     <41 mg/dL  LDL CALCULATED:    This screening LDL is a calculated result  It does not have the accuracy of the Direct Measured LDL in the monitoring of patients with hyperlipidemia and/or statin therapy  Direct Measure LDL (TRP226) must be ordered separately in these patients  TRIGLYCERIDES 97 mg/dL  <=150   Specimen collection should occur prior to N-Acetylcysteine or Metamizole administration due to the potential for falsely depressed results  Procedure    Procedure: cerumen removal    Indication: tympanic membrane(s) could not be visualized and cerumen impaction in the right ear  Procedure Note: The procedure was performed by Avery Castellanos MA  A otoscope was placed in the ear canal(s) to visualize the ear canal debris  The ear was cleaned by using warm water irrigation  The procedure was successful  Post-Procedure:   Patient Status: the patient tolerated the procedure well  Complications: there were no complications  Patient instructions: dry ear precautions  Follow-up as needed        Provider Comments  Provider Comments:   we discussed fatty liver and its sequelae including permanent liver damage    we also discussed its treatment including weight loss and exercise/controlling cholesterol    LFTs checked during recent ER visit and WNL    f/u 12 mos or prn      Future Appointments    Date/Time Provider Specialty Site   03/20/2018 09:00 AM Prabha Botello Urology [facility] [address] [place] [address]   06/19/2018 08:30 AM Hill Moore DO Internal Medicine [facility] INTER[facility] MED     Verified Results  (1) HEMOGLOBIN A1C [id number] 10: 34AM Gaetano Anton     Test Name Result Flag Reference   HEMOGLOBIN A1C 5 7 %  4 2-6 3   EST  AVG  GLUCOSE 117 mg/dl       (1) LIPID PANEL, FASTING 67Eke9842 10:34AM Gaetano Anton     Test Name Result Flag Reference   CHOLESTEROL 190 mg/dL     HDL,DIRECT 54 mg/dL  40-60   Specimen collection should occur prior to Metamizole administration due to the potential for falsely depressed results  LDL CHOLESTEROL CALCULATED 117 mg/dL H 0-100   This is a fasting blood test  Water,black tea or black  coffee only after 9:00pm the night before test  Drink 2 glasses of water the morning of test         Triglyceride:         Normal              <150 mg/dl       Borderline High    150-199 mg/dl       High               200-499 mg/dl       Very High          >499 mg/dl  Cholesterol:         Desirable        <200 mg/dl      Borderline High  200-239 mg/dl      High             >239 mg/dl  HDL Cholesterol:        High    >59 mg/dL      Low     <41 mg/dL  LDL CALCULATED:    This screening LDL is a calculated result  It does not have the accuracy of the Direct Measured LDL in the monitoring of patients with hyperlipidemia and/or statin therapy  Direct Measure LDL (GHF838) must be ordered separately in these patients  TRIGLYCERIDES 97 mg/dL  <=150   Specimen collection should occur prior to N-Acetylcysteine or Metamizole administration due to the potential for falsely depressed results         Signatures   Electronically signed by : Yen Payne DO; Aug  8 2017  4:55PM EST                       (Author)

## 2018-01-22 VITALS
WEIGHT: 200.38 LBS | DIASTOLIC BLOOD PRESSURE: 80 MMHG | RESPIRATION RATE: 18 BRPM | HEIGHT: 72 IN | HEART RATE: 84 BPM | SYSTOLIC BLOOD PRESSURE: 118 MMHG | BODY MASS INDEX: 27.14 KG/M2 | TEMPERATURE: 98.4 F | OXYGEN SATURATION: 98 %

## 2018-03-15 ENCOUNTER — TRANSCRIBE ORDERS (OUTPATIENT)
Dept: LAB | Facility: CLINIC | Age: 68
End: 2018-03-15

## 2018-03-15 ENCOUNTER — APPOINTMENT (OUTPATIENT)
Dept: LAB | Facility: CLINIC | Age: 68
End: 2018-03-15
Payer: COMMERCIAL

## 2018-03-15 DIAGNOSIS — Z12.5 ENCOUNTER FOR SCREENING FOR MALIGNANT NEOPLASM OF PROSTATE: ICD-10-CM

## 2018-03-15 LAB — PSA SERPL-MCNC: 1 NG/ML (ref 0–4)

## 2018-03-15 PROCEDURE — 36415 COLL VENOUS BLD VENIPUNCTURE: CPT

## 2018-03-15 PROCEDURE — G0103 PSA SCREENING: HCPCS

## 2018-03-17 DIAGNOSIS — Z12.5 ENCOUNTER FOR SCREENING FOR MALIGNANT NEOPLASM OF PROSTATE: ICD-10-CM

## 2018-03-20 ENCOUNTER — OFFICE VISIT (OUTPATIENT)
Dept: UROLOGY | Facility: CLINIC | Age: 68
End: 2018-03-20
Payer: COMMERCIAL

## 2018-03-20 VITALS
HEIGHT: 72 IN | WEIGHT: 204 LBS | DIASTOLIC BLOOD PRESSURE: 80 MMHG | HEART RATE: 72 BPM | SYSTOLIC BLOOD PRESSURE: 122 MMHG | BODY MASS INDEX: 27.63 KG/M2

## 2018-03-20 DIAGNOSIS — Z87.442 HISTORY OF NEPHROLITHIASIS: ICD-10-CM

## 2018-03-20 DIAGNOSIS — Z12.5 PROSTATE CANCER SCREENING: Primary | ICD-10-CM

## 2018-03-20 PROCEDURE — 99213 OFFICE O/P EST LOW 20 MIN: CPT | Performed by: PHYSICIAN ASSISTANT

## 2018-03-20 RX ORDER — LANOLIN ALCOHOL/MO/W.PET/CERES
3 CREAM (GRAM) TOPICAL DAILY
COMMUNITY

## 2018-03-20 RX ORDER — ACETAMINOPHEN 160 MG
TABLET,DISINTEGRATING ORAL DAILY
COMMUNITY

## 2018-03-20 NOTE — PROGRESS NOTES
1  Prostate cancer screening     2  History of nephrolithiasis         Assessment and plan:       1  Prostate cancer screening  - PSA is stable with benign examination  Patient will continue with routine prostate cancer screening annually with his primary care provider   - patient has a relatively small prostate on examination without any irritative lower urinary tract symptoms   - I encouraged him to continue with proper hydration and dietary modifications in order to prevent future stone formation  He is aware to contact us in the future with any signs and symptoms of a passing stone  All questions answered  Patient will follow-up with urology on an as-needed basis  Fany Boogie PA-C      Chief Complaint     Prostate cancer screening    History of Present Illness     Irene Newell is a 74 y o  male patient of Dr Jonna Bustillos with a history of BPH presenting for 1 year follow up and routine prostate cancer screening  He is not on any medications for his prostate or bladder  Patient is comfortable with his urination  He feels that he is a fair stream, feels empty after urination, and nocturia 2 times nightly  Denies any urgency, hematuria, incontinence, or dysuria  Patient did have an episode of right lower quadrant pain radiating to the back in July 2017 which prompted an emergency department visit  A CT obtained at that time revealed a 2 mm right UVJ calculus with mild upstream hydronephrosis  Patient was able to successfully pass the stone on his own without any surgical intervention  Stone analysis reveals calcium oxalate and origin  Patient had a recent PSA of 1 0 (3/15/18), previously 1 2 last year        Laboratory     Lab Results   Component Value Date    CREATININE 1 47 (H) 07/30/2017       Lab Results   Component Value Date    PSA 1 0 03/15/2018    PSA 1 2 03/16/2017    PSA 1 0 02/29/2016     Stone analysis   Order: [id number]   Status:  Final result   Visible to patient:  No (Not Released)   Next appt: Today at 09:00 AM in Urology (Aroldo Eldridge PA-C)   Dx:  Personal history of urinary calculi    Ref Range & Units 8/8/17 12:00 AM   COLOR  Ortiz Potter    Size mm 2x2x2    Stone Weight mg 6 0    Composition  Comment    Comments: Percentage (Represents the % composition)   Ca Oxalate,Monohydr  % 97    CALCIUM PHOSPHATE % 03    Nidus  Comment    Comments: Nidus composed of Calcium oxalate monohydrate  STONE COMMENT  Comment    Comments: Physician questions regarding Calculi Analysis contact [employer] at:   [phone]  Please note  Comment    Comments: Calculi report with photograph will follow via computer, mail or    delivery  Disclaimer  Comment    Comments: This test was developed and its performance characteristics   determined by [employer]  It has not been cleared or approved   by the Food and Drug Administration  Photo  Comment    Comments: Photograph will follow under separate cover  Narrative     Performed at: [address]  [id number]  : Jasmyne Barcenas MD, Phone:  [phone]      Specimen Collected: 08/08/17   Last Resulted: 08/21/17  8:08 PM                  Review of Systems     Review of Systems   Constitutional: Negative for activity change, appetite change, chills, diaphoresis, fatigue, fever and unexpected weight change  Respiratory: Negative for chest tightness and shortness of breath  Cardiovascular: Negative for chest pain, palpitations and leg swelling  Gastrointestinal: Negative for abdominal distention, abdominal pain, constipation, diarrhea, nausea and vomiting  Genitourinary: Negative for decreased urine volume, difficulty urinating, dysuria, enuresis, flank pain, frequency, genital sores, hematuria and urgency  Musculoskeletal: Negative for back pain, gait problem and myalgias  Skin: Negative for color change, pallor, rash and wound     Psychiatric/Behavioral: Negative for behavioral problems  The patient is not nervous/anxious  Allergies     No Known Allergies    Physical Exam     Physical Exam   Constitutional: He is oriented to person, place, and time  He appears well-developed and well-nourished  No distress  HENT:   Head: Normocephalic and atraumatic  Eyes: Conjunctivae are normal    Neck: Normal range of motion  No tracheal deviation present  Pulmonary/Chest: Effort normal    Genitourinary:   Genitourinary Comments: Prostate: 20g, smooth, symmetric, no nodules   Neurological: He is alert and oriented to person, place, and time  Skin: Skin is warm and dry  No rash noted  He is not diaphoretic  No erythema  Psychiatric: He has a normal mood and affect   His behavior is normal          Vital Signs     Vitals:    03/20/18 0910   BP: 122/80   Pulse: 72   Weight: 92 5 kg (204 lb)   Height: 6' (1 829 m)         Current Medications       Current Outpatient Prescriptions:     Cholecalciferol (VITAMIN D3) 2000 units capsule, Take by mouth, Disp: , Rfl:     Coenzyme Q10 (CO Q 10) 100 MG CAPS, Take by mouth, Disp: , Rfl:     cyanocobalamin (VITAMIN B-12) 1,000 mcg tablet, Take 3 tablets by mouth daily, Disp: , Rfl:     Multiple Vitamin (MULTIVITAMIN) tablet, Take 1 tablet by mouth daily, Disp: , Rfl:     VVYEUI-WEHYP-OJOREO-FA-FISHOIL PO, Take by mouth daily, Disp: , Rfl:     ondansetron (ZOFRAN) 4 mg tablet, Take 1 tablet by mouth every 8 (eight) hours as needed for nausea or vomiting, Disp: 15 tablet, Rfl: 0    tamsulosin (FLOMAX) 0 4 mg, Take 1 capsule by mouth daily with dinner for 7 days, Disp: 7 capsule, Rfl: 0      Active Problems     Patient Active Problem List   Diagnosis    Benign prostatic hyperplasia    Prostate cancer screening    History of nephrolithiasis         Past Medical History     Past Medical History:   Diagnosis Date    Renal calculi          Surgical History     Past Surgical History:   Procedure Laterality Date    LITHOTRIPSY      LYMPH NODE BIOPSY      TONSILLECTOMY           Family History     Family History   Problem Relation Age of Onset    Colon cancer Mother     Migraines Mother     Lung cancer Father          Social History     Social History       Radiology     CT ABDOMEN AND PELVIS WITH IV CONTRAST     INDICATION:  61-year-old man presenting with right lower quadrant abdominal pain      COMPARISON: Abdominal CT 4/9/2015      TECHNIQUE:  CT examination of the abdomen and pelvis was performed  Reformatted images were created in axial, sagittal, and coronal planes        Radiation dose length product (DLP) for this visit:  587 mGy-cm   This examination, like all CT scans performed in the [facility], was performed utilizing techniques to minimize radiation dose exposure, including the use of iterative   reconstruction and automated exposure control      IV Contrast:  100 mL of iohexol (OMNIPAQUE)       Enteric Contrast:  Enteric contrast was not administered      FINDINGS:     ABDOMEN     LOWER CHEST: 4 mm left lower lobe pulmonary nodule is unchanged since 4/9/2015, radiographically benign  Rounded opacity within the lingula measuring approximately 1 1 cm is unchanged since 4/9/2015, possibly scarring or rounded atelectasis  Given   greater than 2 years stability, no follow-up is required      LIVER/BILIARY TREE:  Liver is diffusely decreased in density consistent with fatty change, which is improved since 2015  No CT evidence of suspicious hepatic mass  Normal hepatic contours  No biliary dilatation      GALLBLADDER:  No calcified gallstones  No pericholecystic inflammatory change      SPLEEN:  Unremarkable      PANCREAS:  Unremarkable      ADRENAL GLANDS:  Unremarkable      KIDNEYS/URETERS:    2 mm obstructing calculus at the right vesicoureteral junction causing mild upstream hydroureteronephrosis  3 mm nonobstructing left kidney calculus   No left hydronephrosis      STOMACH AND BOWEL:  There is colonic diverticulosis without evidence of acute diverticulitis      APPENDIX:  A normal appendix was visualized      ABDOMINOPELVIC CAVITY:  No ascites or free intraperitoneal air  No lymphadenopathy      VESSELS:  Unremarkable for patient's age      PELVIS     REPRODUCTIVE ORGANS:  The prostate is enlarged      URINARY BLADDER:  Unremarkable      ABDOMINAL WALL/INGUINAL REGIONS:  Unremarkable      OSSEOUS STRUCTURES:  No acute fracture or destructive osseous lesion      IMPRESSION:  1   2 mm calculus at the right vesicoureteral junction causing mild upstream hydroureteronephrosis      2   Other urolithiasis as above  3   Colonic diverticulosis without acute diverticulitis  4   Hepatic steatosis

## 2018-06-21 ENCOUNTER — TRANSCRIBE ORDERS (OUTPATIENT)
Dept: LAB | Facility: CLINIC | Age: 68
End: 2018-06-21

## 2018-06-21 ENCOUNTER — APPOINTMENT (OUTPATIENT)
Dept: LAB | Facility: CLINIC | Age: 68
End: 2018-06-21

## 2018-06-21 DIAGNOSIS — Z00.8 HEALTH EXAMINATION IN POPULATION SURVEY: Primary | ICD-10-CM

## 2018-06-21 DIAGNOSIS — Z00.8 HEALTH EXAMINATION IN POPULATION SURVEY: ICD-10-CM

## 2018-06-21 LAB
CHOLEST SERPL-MCNC: 197 MG/DL (ref 50–200)
EST. AVERAGE GLUCOSE BLD GHB EST-MCNC: 117 MG/DL
HBA1C MFR BLD: 5.7 % (ref 4.2–6.3)
HDLC SERPL-MCNC: 47 MG/DL (ref 40–60)
LDLC SERPL CALC-MCNC: 125 MG/DL (ref 0–100)
NONHDLC SERPL-MCNC: 150 MG/DL
TRIGL SERPL-MCNC: 126 MG/DL

## 2018-06-21 PROCEDURE — 80061 LIPID PANEL: CPT

## 2018-06-21 PROCEDURE — 36415 COLL VENOUS BLD VENIPUNCTURE: CPT | Performed by: PREVENTIVE MEDICINE

## 2018-06-21 PROCEDURE — 83036 HEMOGLOBIN GLYCOSYLATED A1C: CPT | Performed by: PREVENTIVE MEDICINE

## 2018-06-27 ENCOUNTER — OFFICE VISIT (OUTPATIENT)
Dept: INTERNAL MEDICINE CLINIC | Facility: CLINIC | Age: 68
End: 2018-06-27
Payer: COMMERCIAL

## 2018-06-27 VITALS
SYSTOLIC BLOOD PRESSURE: 120 MMHG | WEIGHT: 200 LBS | OXYGEN SATURATION: 97 % | DIASTOLIC BLOOD PRESSURE: 80 MMHG | TEMPERATURE: 98.2 F | HEIGHT: 72 IN | HEART RATE: 72 BPM | BODY MASS INDEX: 27.09 KG/M2 | RESPIRATION RATE: 18 BRPM

## 2018-06-27 DIAGNOSIS — R79.89 LOW VITAMIN D LEVEL: ICD-10-CM

## 2018-06-27 DIAGNOSIS — K76.0 FATTY LIVER: ICD-10-CM

## 2018-06-27 DIAGNOSIS — Z12.11 SCREEN FOR COLON CANCER: ICD-10-CM

## 2018-06-27 DIAGNOSIS — Z00.00 WELLNESS EXAMINATION: Primary | ICD-10-CM

## 2018-06-27 DIAGNOSIS — L98.9 NON-HEALING SKIN LESION: ICD-10-CM

## 2018-06-27 DIAGNOSIS — H61.21 IMPACTED CERUMEN, RIGHT EAR: ICD-10-CM

## 2018-06-27 DIAGNOSIS — Z11.59 NEED FOR HEPATITIS C SCREENING TEST: ICD-10-CM

## 2018-06-27 PROCEDURE — 69209 REMOVE IMPACTED EAR WAX UNI: CPT | Performed by: INTERNAL MEDICINE

## 2018-06-27 PROCEDURE — 1160F RVW MEDS BY RX/DR IN RCRD: CPT | Performed by: INTERNAL MEDICINE

## 2018-06-27 PROCEDURE — 99397 PER PM REEVAL EST PAT 65+ YR: CPT | Performed by: INTERNAL MEDICINE

## 2018-06-27 NOTE — PROGRESS NOTES
Assessment/Plan:     Diagnoses and all orders for this visit:    Wellness examination    Low vitamin D level  Comments:  on daily OTC supplement, re-check level now  Orders:  -     Vitamin D 25 hydroxy    Non-healing skin lesion  Comments:  suspect AK, r/o skin cancer -> derm referral & SPF 30 or greater  Orders:  -     Ambulatory referral to Dermatology; Future    Screen for colon cancer  Comments:  advised on benefits of colonoscopy, wife(works in healthcare) has been advising him to complete this as well, ref provided again today  Orders:  -     Ambulatory referral to Colorectal Surgery; Future    Impacted cerumen, right ear  Comments:  ear irrigation today    Fatty liver  Comments:  improved on last CT A/P 2017, re-check LFTs  Orders:  -     Comprehensive metabolic panel; Future    Need for hepatitis C screening test  Comments:  never had before, ordered  Orders:  -     Hepatitis C antibody; Future    Other orders  -     Omega-3 Fatty Acids (FISH OIL) 1200 MG CPDR; Take by mouth  -     Ear cerumen removal          Subjective:      Patient ID: Nabeel Pyle is a 74 y o  male  HPI    Here for annual physical   Completed BW for insurance/wellness  Having return of right ear wax buildup with discomfort  Has non-healing skin lesion with associated flakiness on right temple for last 6 months  Did not complete colonoscopy, never called despite being given referral   Refusing all vaccines including pneumonia vaccine  Exercises regularly - runs 20-30 mins each time and denies CP/SOB/RICH  We discussed lowering cholesterol in diet  No other complaints  History reviewed  No pertinent past medical history  Vitals:    06/27/18 0750   BP: 120/80   Pulse: 72   Resp: 18   Temp: 98 2 °F (36 8 °C)   SpO2: 97%   Weight: 90 7 kg (200 lb)   Height: 6' (1 829 m)     Body mass index is 27 12 kg/m²      Current Outpatient Prescriptions:     Cholecalciferol (VITAMIN D3) 2000 units capsule, Take by mouth, Disp: , Rfl:    Coenzyme Q10 (CO Q 10) 100 MG CAPS, Take by mouth, Disp: , Rfl:     cyanocobalamin (VITAMIN B-12) 1,000 mcg tablet, Take 3 tablets by mouth daily, Disp: , Rfl:     Multiple Vitamin (MULTIVITAMIN) tablet, Take 1 tablet by mouth daily, Disp: , Rfl:     Omega-3 Fatty Acids (FISH OIL) 1200 MG CPDR, Take by mouth, Disp: , Rfl:     ondansetron (ZOFRAN) 4 mg tablet, Take 1 tablet by mouth every 8 (eight) hours as needed for nausea or vomiting, Disp: 15 tablet, Rfl: 0    tamsulosin (FLOMAX) 0 4 mg, Take 1 capsule by mouth daily with dinner for 7 days, Disp: 7 capsule, Rfl: 0  No Known Allergies      Review of Systems   Constitutional: Negative for fever  HENT: Negative for ear discharge  Eyes: Negative for visual disturbance  Respiratory: Negative for shortness of breath  Cardiovascular: Negative for chest pain  Genitourinary: Negative for difficulty urinating  Musculoskeletal: Negative for arthralgias  Allergic/Immunologic: Negative for environmental allergies  Neurological: Negative for headaches  Psychiatric/Behavioral: Negative for dysphoric mood  Objective:      /80   Pulse 72   Temp 98 2 °F (36 8 °C)   Resp 18   Ht 6' (1 829 m)   Wt 90 7 kg (200 lb)   SpO2 97%   BMI 27 12 kg/m²          Physical Exam   Constitutional: He appears well-developed and well-nourished  HENT:   Head: Normocephalic and atraumatic  Right Ear: Tympanic membrane and external ear normal    Left Ear: Tympanic membrane and external ear normal    Mouth/Throat: Oropharynx is clear and moist    Right ear cerumen impaction   Eyes: Conjunctivae are normal  Pupils are equal, round, and reactive to light  Cardiovascular: Normal rate, regular rhythm and normal heart sounds  No murmur heard  Pulmonary/Chest: Effort normal and breath sounds normal  He has no wheezes  He has no rales  Abdominal: Soft  Bowel sounds are normal  There is no tenderness  Musculoskeletal: He exhibits no edema  Lymphadenopathy:     He has no cervical adenopathy  Neurological: He is alert  Skin: Rash noted  1x0 5cm flat, erythematous skin lesion on right temple with flakiness to skin   Psychiatric: He has a normal mood and affect  His behavior is normal    Vitals reviewed  Results for orders placed or performed in visit on 06/21/18   Lipid panel   Result Value Ref Range    Cholesterol 197 50 - 200 mg/dL    Triglycerides 126 <=150 mg/dL    HDL, Direct 47 40 - 60 mg/dL    LDL Calculated 125 (H) 0 - 100 mg/dL    Non-HDL-Chol (CHOL-HDL) 150 mg/dl     Ear cerumen removal  Date/Time: 6/27/2018 8:28 AM  Performed by: Alisa Veliz  Authorized by: Alisa Veliz     Patient location:  Clinic  Other Assisting Provider: Yes (comment) (Merrill Bolanos MA)    Consent:     Consent obtained:  Verbal    Consent given by:  Patient    Risks discussed:  Incomplete removal and pain    Alternatives discussed:  No treatment and alternative treatment  Universal protocol:     Patient identity confirmed:  Verbally with patient  Procedure details:     Local anesthetic:  None    Location:  R ear    Procedure type: irrigation      Approach:  External  Post-procedure details:     Complication:  None    Hearing quality:  Normal    Patient tolerance of procedure:   Tolerated well, no immediate complications

## 2018-06-27 NOTE — PATIENT INSTRUCTIONS
1  Dermatology appointment  2  Use SPF 30 or greater when in direct sun exposure  3  Blood work  4  Colonoscopy    Non-Alcoholic Fatty Liver Disease   WHAT YOU NEED TO KNOW:   Non-alcoholic fatty liver disease (NAFLD) is a buildup of fat in your liver from a condition other than alcoholism  DISCHARGE INSTRUCTIONS:   Medicines:   · Medicines  may be given to manage your blood sugar or cholesterol levels  · Take your medicine as directed  Contact your healthcare provider if you think your medicine is not helping or if you have side effects  Tell him or her if you are allergic to any medicine  Keep a list of the medicines, vitamins, and herbs you take  Include the amounts, and when and why you take them  Bring the list or the pill bottles to follow-up visits  Carry your medicine list with you in case of an emergency  Follow up with your healthcare provider as directed: You may need to return for more tests  You may also be referred to a specialist  Write down your questions so you remember to ask them during your visits  Manage NAFLD:   · Maintain a healthy weight  Ask your healthcare provider how much you should weigh  Ask him to help you create a weight loss plan if you are overweight  · Exercise  Aerobic exercise 3 times a week for 20 to 45 minutes can help decrease fat buildup in your liver  Examples are cycling, brisk walking, or jogging  Ask your healthcare provider about the best exercise plan for you  · Eat healthy foods  Examples are vegetables, fruit, whole-grain breads, low-fat dairy products, beans, lean meats, and fish  Foods low in simple carbohydrates, high fructose corn syrup, and trans fat may help decrease fat buildup in your liver  · Do not drink alcohol  Alcohol may make NAFLD worse and harm your liver  Contact your healthcare provider if:   · You have increased pain or swelling in your abdomen  · You feel more tired than usual     · You bruise or bleed easily      · Your skin or the whites of your eyes look yellow  · You have questions or concerns about your condition or care  Seek care immediately or call 911 if:   · You have shortness of breath  · You have trouble thinking clearly or are confused  · You feel lightheaded or faint  · You have shaking, chills, and a fever  © 2017 [employer] Information is for End User's use only and may not be sold, redistributed or otherwise used for commercial purposes  All illustrations and images included in CareNotes® are the copyrighted property of A D [employer] M [employer]  or [employer]  The above information is an  only  It is not intended as medical advice for individual conditions or treatments  Talk to your doctor, nurse or pharmacist before following any medical regimen to see if it is safe and effective for you

## 2019-06-28 ENCOUNTER — OFFICE VISIT (OUTPATIENT)
Dept: INTERNAL MEDICINE CLINIC | Facility: CLINIC | Age: 69
End: 2019-06-28
Payer: COMMERCIAL

## 2019-06-28 VITALS
BODY MASS INDEX: 27.44 KG/M2 | RESPIRATION RATE: 18 BRPM | SYSTOLIC BLOOD PRESSURE: 118 MMHG | HEIGHT: 72 IN | TEMPERATURE: 96.2 F | DIASTOLIC BLOOD PRESSURE: 80 MMHG | HEART RATE: 65 BPM | OXYGEN SATURATION: 98 % | WEIGHT: 202.6 LBS

## 2019-06-28 DIAGNOSIS — Z13.6 ENCOUNTER FOR LIPID SCREENING FOR CARDIOVASCULAR DISEASE: ICD-10-CM

## 2019-06-28 DIAGNOSIS — R79.89 LOW VITAMIN D LEVEL: ICD-10-CM

## 2019-06-28 DIAGNOSIS — Z12.11 SCREEN FOR COLON CANCER: ICD-10-CM

## 2019-06-28 DIAGNOSIS — Z11.59 NEED FOR HEPATITIS C SCREENING TEST: ICD-10-CM

## 2019-06-28 DIAGNOSIS — Z12.5 PROSTATE CANCER SCREENING: ICD-10-CM

## 2019-06-28 DIAGNOSIS — Z87.442 HISTORY OF NEPHROLITHIASIS: ICD-10-CM

## 2019-06-28 DIAGNOSIS — Z00.00 WELLNESS EXAMINATION: Primary | ICD-10-CM

## 2019-06-28 DIAGNOSIS — Z13.220 ENCOUNTER FOR LIPID SCREENING FOR CARDIOVASCULAR DISEASE: ICD-10-CM

## 2019-06-28 DIAGNOSIS — Z23 NEED FOR TDAP VACCINATION: ICD-10-CM

## 2019-06-28 PROCEDURE — 99397 PER PM REEVAL EST PAT 65+ YR: CPT | Performed by: INTERNAL MEDICINE

## 2019-06-28 PROCEDURE — 1160F RVW MEDS BY RX/DR IN RCRD: CPT | Performed by: INTERNAL MEDICINE

## 2019-07-05 ENCOUNTER — APPOINTMENT (OUTPATIENT)
Dept: LAB | Facility: CLINIC | Age: 69
End: 2019-07-05
Payer: COMMERCIAL

## 2019-07-05 DIAGNOSIS — Z13.6 ENCOUNTER FOR LIPID SCREENING FOR CARDIOVASCULAR DISEASE: ICD-10-CM

## 2019-07-05 DIAGNOSIS — Z13.220 ENCOUNTER FOR LIPID SCREENING FOR CARDIOVASCULAR DISEASE: ICD-10-CM

## 2019-07-05 DIAGNOSIS — Z11.59 NEED FOR HEPATITIS C SCREENING TEST: ICD-10-CM

## 2019-07-05 DIAGNOSIS — Z12.11 SCREEN FOR COLON CANCER: ICD-10-CM

## 2019-07-05 DIAGNOSIS — Z87.442 HISTORY OF NEPHROLITHIASIS: ICD-10-CM

## 2019-07-05 LAB
25(OH)D3 SERPL-MCNC: 36.4 NG/ML (ref 30–100)
ALBUMIN SERPL BCP-MCNC: 3.8 G/DL (ref 3.5–5)
ALP SERPL-CCNC: 86 U/L (ref 46–116)
ALT SERPL W P-5'-P-CCNC: 25 U/L (ref 12–78)
ANION GAP SERPL CALCULATED.3IONS-SCNC: 10 MMOL/L (ref 4–13)
AST SERPL W P-5'-P-CCNC: 17 U/L (ref 5–45)
BILIRUB SERPL-MCNC: 0.7 MG/DL (ref 0.2–1)
BUN SERPL-MCNC: 19 MG/DL (ref 5–25)
CALCIUM SERPL-MCNC: 8.7 MG/DL (ref 8.3–10.1)
CHLORIDE SERPL-SCNC: 106 MMOL/L (ref 100–108)
CHOLEST SERPL-MCNC: 222 MG/DL (ref 50–200)
CO2 SERPL-SCNC: 24 MMOL/L (ref 21–32)
CREAT SERPL-MCNC: 1.07 MG/DL (ref 0.6–1.3)
GFR SERPL CREATININE-BSD FRML MDRD: 72 ML/MIN/1.73SQ M
GLUCOSE P FAST SERPL-MCNC: 95 MG/DL (ref 65–99)
HCV AB SER QL: NORMAL
HDLC SERPL-MCNC: 48 MG/DL (ref 40–60)
HEMOCCULT STL QL IA: NEGATIVE
LDLC SERPL CALC-MCNC: 130 MG/DL (ref 0–100)
POTASSIUM SERPL-SCNC: 3.8 MMOL/L (ref 3.5–5.3)
PROT SERPL-MCNC: 7.5 G/DL (ref 6.4–8.2)
PSA SERPL-MCNC: 1.2 NG/ML (ref 0–4)
SODIUM SERPL-SCNC: 140 MMOL/L (ref 136–145)
TRIGL SERPL-MCNC: 219 MG/DL

## 2019-07-05 PROCEDURE — 86803 HEPATITIS C AB TEST: CPT

## 2019-07-05 PROCEDURE — G0103 PSA SCREENING: HCPCS | Performed by: INTERNAL MEDICINE

## 2019-07-05 PROCEDURE — 80053 COMPREHEN METABOLIC PANEL: CPT

## 2019-07-05 PROCEDURE — G0328 FECAL BLOOD SCRN IMMUNOASSAY: HCPCS

## 2019-07-05 PROCEDURE — 36415 COLL VENOUS BLD VENIPUNCTURE: CPT | Performed by: INTERNAL MEDICINE

## 2019-07-05 PROCEDURE — 80061 LIPID PANEL: CPT

## 2019-07-05 PROCEDURE — 82306 VITAMIN D 25 HYDROXY: CPT | Performed by: INTERNAL MEDICINE

## 2019-07-09 ENCOUNTER — TELEPHONE (OUTPATIENT)
Dept: INTERNAL MEDICINE CLINIC | Facility: CLINIC | Age: 69
End: 2019-07-09

## 2019-07-09 NOTE — TELEPHONE ENCOUNTER
----- Message from Lizabeth Najjar, DO sent at 7/9/2019  1:06 PM EDT -----  BW is back and looks fine except cholesterol which has gone up to 222  Please advise Opal Brown to follow a low cholesterol diet to avoid having to take a medication for cholesterol  Okay to mail Opal Brown a copy of his BW if he would like a copy

## 2019-08-20 ENCOUNTER — OFFICE VISIT (OUTPATIENT)
Dept: URGENT CARE | Facility: CLINIC | Age: 69
End: 2019-08-20
Payer: COMMERCIAL

## 2019-08-20 VITALS
TEMPERATURE: 97.9 F | DIASTOLIC BLOOD PRESSURE: 80 MMHG | BODY MASS INDEX: 28.23 KG/M2 | HEIGHT: 72 IN | OXYGEN SATURATION: 99 % | SYSTOLIC BLOOD PRESSURE: 118 MMHG | RESPIRATION RATE: 18 BRPM | WEIGHT: 208.4 LBS | HEART RATE: 62 BPM

## 2019-08-20 DIAGNOSIS — H61.21 RIGHT EAR IMPACTED CERUMEN: Primary | ICD-10-CM

## 2019-08-20 PROCEDURE — 69209 REMOVE IMPACTED EAR WAX UNI: CPT | Performed by: PHYSICIAN ASSISTANT

## 2019-08-20 PROCEDURE — 99213 OFFICE O/P EST LOW 20 MIN: CPT | Performed by: PHYSICIAN ASSISTANT

## 2019-08-20 NOTE — PROGRESS NOTES
[id number]        NAME: Lamont Nelson is a 79 y o  male  : 1950    MRN: [id number]  DATE: 2019  TIME: 4:30 PM    Assessment and Plan   Right ear impacted cerumen [H61 21]  1  Right ear impacted cerumen  Ear cerumen removal         Patient Instructions     Cerumen was successfully removed from right ear  Instructed to keep dry and avoid any excessive water exposure  Follow up with PCP in 3-5 days  Proceed to  ER if symptoms worsen  Chief Complaint     Chief Complaint   Patient presents with    Cerumen Impaction     R ear with cerumen impaction x several days  Denies URI s/s, vertigo or pain but notes decreased hearing  History of Present Illness       Pt presents with c/o blocked right ear, decreased hearing, believes it to be cerumen  Has no other symptoms or complaints today  Review of Systems   Review of Systems   Constitutional: Negative  HENT: Positive for hearing loss (Right)  Negative for ear discharge, ear pain and tinnitus  Respiratory: Negative  Cardiovascular: Negative  Gastrointestinal: Negative  Genitourinary: Negative  Neurological: Negative for dizziness           Current Medications       Current Outpatient Medications:     Ascorbic Acid (VITAMIN C PO), Take by mouth, Disp: , Rfl:     Cholecalciferol (VITAMIN D3) 2000 units capsule, Take by mouth, Disp: , Rfl:     Coenzyme Q10 (CO Q 10) 100 MG CAPS, Take by mouth, Disp: , Rfl:     cyanocobalamin (VITAMIN B-12) 1,000 mcg tablet, Take 3 tablets by mouth daily, Disp: , Rfl:     LUTEIN PO, Take 2 tablets by mouth daily, Disp: , Rfl:     Multiple Vitamin (MULTIVITAMIN) tablet, Take 1 tablet by mouth daily, Disp: , Rfl:     Omega-3 Fatty Acids (FISH OIL) 1200 MG CPDR, Take by mouth, Disp: , Rfl:     Current Allergies     Allergies as of 2019    (No Known Allergies)            The following portions of the patient's history were reviewed and updated as appropriate: allergies, current medications, past family history, past medical history, past social history, past surgical history and problem list      Past Medical History:   Diagnosis Date    Chronic kidney disease     Kidney stones        Past Surgical History:   Procedure Laterality Date    LITHOTRIPSY      LYMPH NODE BIOPSY  1999    cervical, path showerd toxoplasmosis, no treatment    OTHER SURGICAL HISTORY      lymphatic surgery    TONSILLECTOMY         Family History   Problem Relation Age of Onset    Colon cancer Mother         dx late 57's    Migraines Mother     Lung cancer Father     Alcohol abuse Neg Hx     Substance Abuse Neg Hx     Mental illness Neg Hx     Depression Neg Hx          Medications have been verified  Objective   /80 (BP Location: Left arm, Patient Position: Sitting, Cuff Size: Standard)   Pulse 62   Temp 97 9 °F (36 6 °C) (Tympanic)   Resp 18   Ht 6' (1 829 m)   Wt 94 5 kg (208 lb 6 4 oz)   SpO2 99%   BMI 28 26 kg/m²        Physical Exam     Physical Exam   Constitutional: He is oriented to person, place, and time  He appears well-developed and well-nourished  No distress  HENT:   Right EAC with cerumen impaction  Unable to visualize TM  Left ear exam WNL  Neurological: He is alert and oriented to person, place, and time  Psychiatric: He has a normal mood and affect  Vitals reviewed  Ear cerumen removal  Date/Time: 8/20/2019 9:29 AM  Performed by: Josie Bower PA-C  Authorized by:  Josie Bower PA-C     Patient location:  Clinic  Indications / Diagnosis:  Right ear cerumen impaction  Other Assisting Provider: No    Consent:     Consent obtained:  Verbal    Consent given by:  Patient  Universal protocol:     Procedure explained and questions answered to patient or proxy's satisfaction: yes    Procedure details:     Local anesthetic:  None    Location:  R ear    Procedure type: irrigation only      Approach:  External    Visualization (free text): Otoscope  Post-procedure details:     Complication:  None    Hearing quality:  Improved    Patient tolerance of procedure:   Tolerated well, no immediate complications

## 2019-12-06 ENCOUNTER — ANESTHESIA EVENT (OUTPATIENT)
Dept: GASTROENTEROLOGY | Facility: AMBULARY SURGERY CENTER | Age: 69
End: 2019-12-06

## 2019-12-20 ENCOUNTER — HOSPITAL ENCOUNTER (OUTPATIENT)
Dept: GASTROENTEROLOGY | Facility: AMBULARY SURGERY CENTER | Age: 69
Setting detail: OUTPATIENT SURGERY
Discharge: HOME | End: 2019-12-20
Attending: COLON & RECTAL SURGERY | Admitting: COLON & RECTAL SURGERY
Payer: COMMERCIAL

## 2019-12-20 ENCOUNTER — ANESTHESIA (OUTPATIENT)
Dept: GASTROENTEROLOGY | Facility: AMBULARY SURGERY CENTER | Age: 69
End: 2019-12-20

## 2019-12-20 VITALS
BODY MASS INDEX: 28.04 KG/M2 | HEIGHT: 72 IN | TEMPERATURE: 97.4 F | RESPIRATION RATE: 14 BRPM | OXYGEN SATURATION: 97 % | WEIGHT: 207 LBS | DIASTOLIC BLOOD PRESSURE: 76 MMHG | HEART RATE: 62 BPM | SYSTOLIC BLOOD PRESSURE: 115 MMHG

## 2019-12-20 DIAGNOSIS — Z12.11 COLON CANCER SCREENING: ICD-10-CM

## 2019-12-20 PROCEDURE — 88305 TISSUE EXAM BY PATHOLOGIST: CPT | Performed by: PATHOLOGY

## 2019-12-20 PROCEDURE — 99243 OFF/OP CNSLTJ NEW/EST LOW 30: CPT | Performed by: COLON & RECTAL SURGERY

## 2019-12-20 PROCEDURE — 45385 COLONOSCOPY W/LESION REMOVAL: CPT | Performed by: COLON & RECTAL SURGERY

## 2019-12-20 RX ORDER — PROPOFOL 10 MG/ML
INJECTION, EMULSION INTRAVENOUS AS NEEDED
Status: DISCONTINUED | OUTPATIENT
Start: 2019-12-20 | End: 2019-12-20 | Stop reason: SURG

## 2019-12-20 RX ORDER — SODIUM CHLORIDE, SODIUM LACTATE, POTASSIUM CHLORIDE, CALCIUM CHLORIDE 600; 310; 30; 20 MG/100ML; MG/100ML; MG/100ML; MG/100ML
125 INJECTION, SOLUTION INTRAVENOUS CONTINUOUS
Status: DISCONTINUED | OUTPATIENT
Start: 2019-12-20 | End: 2019-12-24 | Stop reason: HOSPADM

## 2019-12-20 RX ORDER — LIDOCAINE HYDROCHLORIDE 10 MG/ML
INJECTION, SOLUTION EPIDURAL; INFILTRATION; INTRACAUDAL; PERINEURAL AS NEEDED
Status: DISCONTINUED | OUTPATIENT
Start: 2019-12-20 | End: 2019-12-20 | Stop reason: SURG

## 2019-12-20 RX ADMIN — PROPOFOL 50 MG: 10 INJECTION, EMULSION INTRAVENOUS at 10:49

## 2019-12-20 RX ADMIN — LIDOCAINE HYDROCHLORIDE 50 MG: 10 INJECTION, SOLUTION EPIDURAL; INFILTRATION; INTRACAUDAL; PERINEURAL at 10:46

## 2019-12-20 RX ADMIN — PROPOFOL 20 MG: 10 INJECTION, EMULSION INTRAVENOUS at 11:04

## 2019-12-20 RX ADMIN — SODIUM CHLORIDE, SODIUM LACTATE, POTASSIUM CHLORIDE, AND CALCIUM CHLORIDE: .6; .31; .03; .02 INJECTION, SOLUTION INTRAVENOUS at 10:42

## 2019-12-20 RX ADMIN — PROPOFOL 50 MG: 10 INJECTION, EMULSION INTRAVENOUS at 10:53

## 2019-12-20 RX ADMIN — PROPOFOL 100 MG: 10 INJECTION, EMULSION INTRAVENOUS at 10:46

## 2019-12-20 NOTE — DISCHARGE INSTRUCTIONS
Colorectal Polyps   WHAT YOU NEED TO KNOW:   Colorectal polyps are small growths of tissue in the lining of the colon and rectum  Most polyps are hyperplastic polyps and are usually benign (noncancerous)  Certain types of polyps, called adenomatous polyps, may turn into cancer  DISCHARGE INSTRUCTIONS:   Follow up with your healthcare provider or gastroenterologist as directed: You may need to return for more tests, such as another colonoscopy  Write down your questions so you remember to ask them during your visits  Reduce your risk for colorectal polyps:   · Eat a variety of healthy foods:  Healthy foods include fruit, vegetables, whole-grain breads, low-fat dairy products, beans, lean meat, and fish  Ask if you need to be on a special diet  · Maintain a healthy weight:  Ask your healthcare provider if you need to lose weight and how much you need to lose  Ask for help with a weight loss program     · Exercise:  Begin to exercise slowly and do more as you get stronger  Talk with your healthcare provider before you start an exercise program      · Limit alcohol:  Your risk for polyps increases the more you drink  · Do not smoke: If you smoke, it is never too late to quit  Ask for information about how to stop  For support and more information:   · Jesus Dickson ([facility])  [address], [place] [address]  Phone: 1- 036 - [phone]  Web Address: www digestive  niddk nih gov  Contact your healthcare provider or gastroenterologist if:   · You have a fever  · You have chills, a cough, or feel weak and achy  · You have abdominal pain that does not go away or gets worse after you take medicine  · Your abdomen is swollen  · You are losing weight without trying  · You have questions or concerns about your condition or care  Seek care immediately or call 911 if:   · You have sudden shortness of breath       · You have a fast heart rate, fast breathing, or are too dizzy to stand up  · You have severe abdominal pain  · You see blood in your bowel movement  © 2017 [employer]  Information is for End User's use only and may not be sold, redistributed or otherwise used for commercial purposes  All illustrations and images included in CareNotes® are the copyrighted property of [employer] M , Inc  or [employer]  The above information is an  only  It is not intended as medical advice for individual conditions or treatments  Talk to your doctor, nurse or pharmacist before following any medical regimen to see if it is safe and effective for you  Diverticulosis   WHAT YOU NEED TO KNOW:   Diverticulosis is a condition that causes small pockets called diverticula to form in your intestine  These pockets make it difficult for bowel movements to pass through your digestive system  DISCHARGE INSTRUCTIONS:   Seek care immediately if:   · You have severe pain on the left side of your lower abdomen  · Your bowel movements are bright or dark red  Contact your healthcare provider if:   · You have a fever and chills  · You feel dizzy or lightheaded  · You have nausea, or you are vomiting  · You have a change in your bowel movements  · You have questions or concerns about your condition or care  Medicines:   · Medicines  to soften your bowel movements may be given  You may also need medicines to treat symptoms such as bloating and pain  · Take your medicine as directed  Contact your healthcare provider if you think your medicine is not helping or if you have side effects  Tell him or her if you are allergic to any medicine  Keep a list of the medicines, vitamins, and herbs you take  Include the amounts, and when and why you take them  Bring the list or the pill bottles to follow-up visits  Carry your medicine list with you in case of an emergency  Self-care:   The goal of treatment is to manage any symptoms you have and prevent other problems such as diverticulitis  Diverticulitis is swelling or infection of the diverticula  Your healthcare provider may recommend any of the following:  · Eat a variety of high-fiber foods  High-fiber foods help you have regular bowel movements  High-fiber foods include cooked beans, fruits, vegetables, and some cereals  Most adults need 25 to 35 grams of fiber each day  Your healthcare provider may recommend that you have more  Ask your healthcare provider how much fiber you need  Increase fiber slowly  You may have abdominal discomfort, bloating, and gas if you add fiber to your diet too quickly  You may need to take a fiber supplement if you are not getting enough fiber from food  · Drink liquids as directed  You may need to drink 2 to 3 liters (8 to 12 cups) of liquids every day  Ask your healthcare provider how much liquid to drink each day and which liquids are best for you  · Apply heat  on your abdomen for 20 to 30 minutes every 2 hours for as many days as directed  Heat helps decrease pain and muscle spasms  Help prevent diverticulitis or other symptoms: The following may help decrease your risk for diverticulitis or symptoms, such as bleeding  Talk to your provider about these or other things you can do to prevent problems that may occur with diverticulosis  · Exercise regularly  Ask your healthcare provider about the best exercise plan for you  Exercise can help you have regular bowel movements  Get 30 minutes of exercise on most days of the week  · Maintain a healthy weight  Ask your healthcare provider how much you should weigh  Ask him or her to help you create a weight loss plan if you are overweight  · Do not smoke  Nicotine and other chemicals in cigarettes increase your risk for diverticulitis  Ask your healthcare provider for information if you currently smoke and need help to quit   E-cigarettes or smokeless tobacco still contain nicotine  Talk to your healthcare provider before you use these products  · Ask your healthcare provider if it is safe to take NSAIDs  NSAIDs may increase your risk of diverticulitis  Follow up with your healthcare provider as directed:  Write down your questions so you remember to ask them during your visits  © 2017 [employer] Information is for End User's use only and may not be sold, redistributed or otherwise used for commercial purposes  All illustrations and images included in CareNotes® are the copyrighted property of [employer]  or [employer]  The above information is an  only  It is not intended as medical advice for individual conditions or treatments  Talk to your doctor, nurse or pharmacist before following any medical regimen to see if it is safe and effective for you

## 2019-12-20 NOTE — H&P
History and Physical   Colon and Rectal Surgery   Jesse Chou 81 y o  male MRN: [id number]  Unit/Bed#:  Encounter: [id number]  12/20/19   10:43 AM      CC: Screening of the colon  High risk  History of Present Illness   HPI:  Jesse Chou is a 81 y o  male with no GI symptoms      Historical Information   Past Medical History:   Diagnosis Date    Chronic kidney disease     Kidney stones      Past Surgical History:   Procedure Laterality Date    LITHOTRIPSY      LYMPH NODE BIOPSY  1999    cervical, path showerd toxoplasmosis, no treatment    OTHER SURGICAL HISTORY      lymphatic surgery    TONSILLECTOMY         Meds/Allergies       (Not in a hospital admission)      Current Outpatient Medications:     Ascorbic Acid (VITAMIN C PO), Take by mouth, Disp: , Rfl:     Cholecalciferol (VITAMIN D3) 2000 units capsule, Take by mouth, Disp: , Rfl:     Coenzyme Q10 (CO Q 10) 100 MG CAPS, Take by mouth, Disp: , Rfl:     cyanocobalamin (VITAMIN B-12) 1,000 mcg tablet, Take 3 tablets by mouth daily, Disp: , Rfl:     LUTEIN PO, Take 2 tablets by mouth daily, Disp: , Rfl:     Multiple Vitamin (MULTIVITAMIN) tablet, Take 1 tablet by mouth daily, Disp: , Rfl:     Omega-3 Fatty Acids (FISH OIL) 1200 MG CPDR, Take by mouth, Disp: , Rfl:     Current Facility-Administered Medications:     lactated ringers infusion, 125 mL/hr, Intravenous, Continuous, Jesi Zepeda MD    No Known Allergies      Social History   Social History     Substance and Sexual Activity   Alcohol Use Yes    Alcohol/week: 1 0 standard drinks    Types: 1 Standard drinks or equivalent per week    Frequency: 2-4 times a month    Comment: occasional     Social History     Substance and Sexual Activity   Drug Use No     Social History     Tobacco Use   Smoking Status Never Smoker   Smokeless Tobacco Never Used         Family History:   Family History   Problem Relation Age of Onset    Colon cancer Mother         dx late 63's    Migraines Mother     Cancer Mother 61        Liver or Colon cancer?  Lung cancer Father     Alcohol abuse Neg Hx     Substance Abuse Neg Hx     Mental illness Neg Hx     Depression Neg Hx          Objective     Current Vitals:   Blood Pressure: 137/98 (12/20/19 1005)  Pulse: 61 (12/20/19 1005)  Temperature: (!) 97 4 °F (36 3 °C) (12/20/19 1005)  Temp Source: Temporal (12/20/19 1005)  Respirations: 18 (12/20/19 1005)  Height: 6' (182 9 cm) (12/20/19 1005)  Weight - Scale: 93 9 kg (207 lb) (12/20/19 1005)  SpO2: 98 % (12/20/19 1005)  No intake or output data in the 24 hours ending 12/20/19 1043    Physical Exam:  General:  Well nourished, no distress  Neuro: Alert and oriented  Eyes:Sclera anicteric, conjunctiva pink  Pulm: Clear to auscultation bilaterally  No respiratory Distress  CV:  Regular rate and rhythm  No murmurs  Abdomen:  Soft, flat, non-tender, without masses or hepatosplenomegaly  Lab Results:       ASSESSMENT:  Derrek Hoffman is a 81 y o  male for high risk screening  PLAN:  Colonoscopy  Risks , including, but not limited to, bleeding, perforation, missed lesions, and potential need for surgery, were reviewed  Alternatives to colonoscopy were discussed    Monae King MD

## 2019-12-20 NOTE — ANESTHESIA POSTPROCEDURE EVALUATION
Post-Op Assessment Note    CV Status:  Stable    Pain management: adequate     Mental Status:  Alert and awake   Hydration Status:  Euvolemic   PONV Controlled:  Controlled   Airway Patency:  Patent   Post Op Vitals Reviewed: Yes      Staff: CRNA           BP   112/69   Temp     Pulse 60   Resp 16   SpO2 94

## 2019-12-20 NOTE — ANESTHESIA PREPROCEDURE EVALUATION
Review of Systems/Medical History  Patient summary reviewed  Chart reviewed  No history of anesthetic complications     Cardiovascular  Negative cardio ROS Exercise tolerance (METS): >4,  No angina , No RICH,    Pulmonary  Negative pulmonary ROS Not a smoker , No shortness of breath, No recent URI ,        GI/Hepatic    Liver disease (fatty liver disease) , Bowel prep       Kidney stones, Kidney disease CKD,        Endo/Other     GYN       Hematology   Musculoskeletal       Neurology   Psychology           Physical Exam    Airway    Mallampati score: II  TM Distance: >3 FB  Neck ROM: full     Dental   No notable dental hx     Cardiovascular  Comment: Negative ROS, Cardiovascular exam normal    Pulmonary  Pulmonary exam normal     Other Findings        Anesthesia Plan  ASA Score- 2     Anesthesia Type- IV sedation with anesthesia with ASA Monitors  Additional Monitors:   Airway Plan:         Plan Factors-    Induction- intravenous  Postoperative Plan-     Informed Consent- Anesthetic plan and risks discussed with patient  I personally reviewed this patient with the CRNA  Discussed and agreed on the Anesthesia Plan with the CRNA  Arsalan Hernandez

## 2020-04-27 ENCOUNTER — OFFICE VISIT (OUTPATIENT)
Dept: INTERNAL MEDICINE CLINIC | Facility: CLINIC | Age: 70
End: 2020-04-27
Payer: COMMERCIAL

## 2020-04-27 VITALS
BODY MASS INDEX: 28.17 KG/M2 | DIASTOLIC BLOOD PRESSURE: 76 MMHG | SYSTOLIC BLOOD PRESSURE: 112 MMHG | WEIGHT: 208 LBS | RESPIRATION RATE: 18 BRPM | TEMPERATURE: 96.6 F | OXYGEN SATURATION: 96 % | HEART RATE: 60 BPM | HEIGHT: 72 IN

## 2020-04-27 DIAGNOSIS — H60.331 ACUTE SWIMMER'S EAR OF RIGHT SIDE: Primary | ICD-10-CM

## 2020-04-27 PROCEDURE — 1036F TOBACCO NON-USER: CPT | Performed by: INTERNAL MEDICINE

## 2020-04-27 PROCEDURE — 1160F RVW MEDS BY RX/DR IN RCRD: CPT | Performed by: INTERNAL MEDICINE

## 2020-04-27 PROCEDURE — 3008F BODY MASS INDEX DOCD: CPT | Performed by: INTERNAL MEDICINE

## 2020-04-27 PROCEDURE — 99213 OFFICE O/P EST LOW 20 MIN: CPT | Performed by: INTERNAL MEDICINE

## 2020-04-27 RX ORDER — OFLOXACIN 3 MG/ML
10 SOLUTION AURICULAR (OTIC) DAILY
Qty: 5 ML | Refills: 0 | Status: SHIPPED | OUTPATIENT
Start: 2020-04-27 | End: 2020-05-04

## 2020-07-02 ENCOUNTER — OFFICE VISIT (OUTPATIENT)
Dept: INTERNAL MEDICINE CLINIC | Facility: CLINIC | Age: 70
End: 2020-07-02
Payer: COMMERCIAL

## 2020-07-02 VITALS
HEIGHT: 72 IN | TEMPERATURE: 98.4 F | RESPIRATION RATE: 18 BRPM | DIASTOLIC BLOOD PRESSURE: 82 MMHG | HEART RATE: 69 BPM | SYSTOLIC BLOOD PRESSURE: 110 MMHG | BODY MASS INDEX: 27.55 KG/M2 | OXYGEN SATURATION: 95 % | WEIGHT: 203.4 LBS

## 2020-07-02 DIAGNOSIS — E78.5 DYSLIPIDEMIA: ICD-10-CM

## 2020-07-02 DIAGNOSIS — Z12.11 SCREEN FOR COLON CANCER: ICD-10-CM

## 2020-07-02 DIAGNOSIS — Z91.89 FRAMINGHAM CARDIAC RISK 10-20% IN NEXT 10 YEARS: ICD-10-CM

## 2020-07-02 DIAGNOSIS — Z12.5 SCREENING FOR PROSTATE CANCER: ICD-10-CM

## 2020-07-02 DIAGNOSIS — Z00.00 WELLNESS EXAMINATION: Primary | ICD-10-CM

## 2020-07-02 PROCEDURE — 99397 PER PM REEVAL EST PAT 65+ YR: CPT | Performed by: INTERNAL MEDICINE

## 2020-07-02 RX ORDER — SACCHAROMYCES BOULARDII 250 MG
250 CAPSULE ORAL 2 TIMES DAILY
COMMUNITY
End: 2021-06-10 | Stop reason: HOSPADM

## 2020-07-02 NOTE — PROGRESS NOTES
Assessment/Plan:     Diagnoses and all orders for this visit:    Wellness examination    Dyslipidemia  Comments:  with elevated FRS of greater than 10%, pt requests to make diet changes and re-check FLP first(has 8+ eggs/week and cobb for breakfast every day)  Orders:  -     Lipid Panel with Direct LDL reflex; Future  -     Comprehensive metabolic panel; Future  -     CBC and differential    [place] cardiac risk 10-20% in next 10 years  Comments:  discussed primary prevention with statin therapy, Cee López declined    Screening for prostate cancer  Comments:  no new symptoms, PSA ordered  Orders:  -     PSA, Total Screen    Screen for colon cancer  Comments:  declines colonoscopy, FIT kit ordered  Orders:  -     Occult Blood, Fecal Immunochemical; Future    Other orders  -     saccharomyces boulardii (FLORASTOR) 250 mg capsule; Take 250 mg by mouth 2 (two) times a day      BMI Counseling: Body mass index is 27 59 kg/m²  The BMI is above normal  Exercise recommendations include exercising 3-5 times per week  Cee López declines to receive any/all vaccines including flu and pneumonia vaccines  Subjective:      Patient ID: Brina Berg is a 81 y o  male  HPI    Here for physical, takes only OTC medications on a regular basis  Denies any updates to medical or family history  Exercises regularly, runs 3-4 days of the week  Had elevated lipids on last yr's blood work declined to take statin(primary prevention, [place] risk score >10%)  Requests PSA for prostate cancer screening  Building a home in the area which has caused Cee López some added stress  Working part-time & may retire in near future  ROS otherwise negative, no other complaints  Past Medical History:   Diagnosis Date    Kidney stones      Vitals:    07/02/20 0925   BP: 110/82   Pulse: 69   Resp: 18   Temp: 98 4 °F (36 9 °C)   SpO2: 95%   Weight: 92 3 kg (203 lb 6 4 oz)   Height: 6' (1 829 m)     Body mass index is 27 59 kg/m²      Current Outpatient Medications:     Ascorbic Acid (VITAMIN C PO), Take by mouth, Disp: , Rfl:     Cholecalciferol (VITAMIN D3) 2000 units capsule, Take by mouth, Disp: , Rfl:     Coenzyme Q10 (CO Q 10) 100 MG CAPS, Take by mouth, Disp: , Rfl:     cyanocobalamin (VITAMIN B-12) 1,000 mcg tablet, Take 3 tablets by mouth daily, Disp: , Rfl:     LUTEIN PO, Take 2 tablets by mouth daily, Disp: , Rfl:     Multiple Vitamin (MULTIVITAMIN) tablet, Take 1 tablet by mouth daily, Disp: , Rfl:     Omega-3 Fatty Acids (FISH OIL) 1200 MG CPDR, Take by mouth, Disp: , Rfl:     saccharomyces boulardii (FLORASTOR) 250 mg capsule, Take 250 mg by mouth 2 (two) times a day, Disp: , Rfl:   No Known Allergies      Review of Systems   Constitutional: Negative for fever  HENT: Negative for congestion  Eyes: Negative for visual disturbance  Respiratory: Negative for shortness of breath  Cardiovascular: Negative for chest pain  Gastrointestinal: Negative for abdominal pain  Endocrine: Negative for polyuria  Genitourinary: Negative for dysuria  Musculoskeletal: Negative for arthralgias  Skin: Negative for rash  Allergic/Immunologic: Negative for immunocompromised state  Neurological: Negative for headaches  Psychiatric/Behavioral: Negative for dysphoric mood  Objective:      /82   Pulse 69   Temp 98 4 °F (36 9 °C)   Resp 18   Ht 6' (1 829 m)   Wt 92 3 kg (203 lb 6 4 oz)   SpO2 95%   BMI 27 59 kg/m²          Physical Exam   Constitutional: He appears well-developed and well-nourished  HENT:   Head: Normocephalic and atraumatic  Eyes: Pupils are equal, round, and reactive to light  Cardiovascular: Normal rate and regular rhythm  No murmur heard  Pulmonary/Chest: Effort normal and breath sounds normal  He has no wheezes  He has no rales  Abdominal: Soft  Bowel sounds are normal  There is no tenderness  Musculoskeletal: He exhibits no edema  Neurological: He is alert     Psychiatric: He has a normal mood and affect  His behavior is normal    Vitals reviewed        The 10-year ASCVD risk score (Kaitlin Nielson et al , 2013) is: 12 5%    Values used to calculate the score:      Age: 81 years      Sex: Male      Is Non- : No      Diabetic: No      Tobacco smoker: No      Systolic Blood Pressure: 180 mmHg      Is BP treated: No      HDL Cholesterol: 48 mg/dL      Total Cholesterol: 222 mg/dL

## 2020-07-02 NOTE — PATIENT INSTRUCTIONS
1  Fasting blood work  2  Colon cancer screening kit  3  Return every 12 months or sooner if questions    [email or web address]

## 2020-07-09 ENCOUNTER — APPOINTMENT (OUTPATIENT)
Dept: LAB | Facility: CLINIC | Age: 70
End: 2020-07-09
Payer: COMMERCIAL

## 2020-07-09 DIAGNOSIS — E78.5 DYSLIPIDEMIA: ICD-10-CM

## 2020-07-09 DIAGNOSIS — Z12.11 SCREEN FOR COLON CANCER: ICD-10-CM

## 2020-07-09 LAB
ALBUMIN SERPL BCP-MCNC: 3.7 G/DL (ref 3.5–5)
ALP SERPL-CCNC: 82 U/L (ref 46–116)
ALT SERPL W P-5'-P-CCNC: 21 U/L (ref 12–78)
ANION GAP SERPL CALCULATED.3IONS-SCNC: 8 MMOL/L (ref 4–13)
AST SERPL W P-5'-P-CCNC: 14 U/L (ref 5–45)
BASOPHILS # BLD AUTO: 0.04 THOUSANDS/ΜL (ref 0–0.1)
BASOPHILS NFR BLD AUTO: 1 % (ref 0–1)
BILIRUB SERPL-MCNC: 0.8 MG/DL (ref 0.2–1)
BUN SERPL-MCNC: 15 MG/DL (ref 5–25)
CALCIUM SERPL-MCNC: 8.8 MG/DL (ref 8.3–10.1)
CHLORIDE SERPL-SCNC: 105 MMOL/L (ref 100–108)
CHOLEST SERPL-MCNC: 189 MG/DL (ref 50–200)
CO2 SERPL-SCNC: 28 MMOL/L (ref 21–32)
CREAT SERPL-MCNC: 0.95 MG/DL (ref 0.6–1.3)
EOSINOPHIL # BLD AUTO: 0.1 THOUSAND/ΜL (ref 0–0.61)
EOSINOPHIL NFR BLD AUTO: 1 % (ref 0–6)
ERYTHROCYTE [DISTWIDTH] IN BLOOD BY AUTOMATED COUNT: 13.6 % (ref 11.6–15.1)
GFR SERPL CREATININE-BSD FRML MDRD: 82 ML/MIN/1.73SQ M
GLUCOSE P FAST SERPL-MCNC: 95 MG/DL (ref 65–99)
HCT VFR BLD AUTO: 43.7 % (ref 36.5–49.3)
HDLC SERPL-MCNC: 44 MG/DL
HEMOCCULT STL QL IA: NEGATIVE
HGB BLD-MCNC: 14.3 G/DL (ref 12–17)
IMM GRANULOCYTES # BLD AUTO: 0.03 THOUSAND/UL (ref 0–0.2)
IMM GRANULOCYTES NFR BLD AUTO: 0 % (ref 0–2)
LDLC SERPL CALC-MCNC: 106 MG/DL (ref 0–100)
LYMPHOCYTES # BLD AUTO: 2.47 THOUSANDS/ΜL (ref 0.6–4.47)
LYMPHOCYTES NFR BLD AUTO: 34 % (ref 14–44)
MCH RBC QN AUTO: 29.5 PG (ref 26.8–34.3)
MCHC RBC AUTO-ENTMCNC: 32.7 G/DL (ref 31.4–37.4)
MCV RBC AUTO: 90 FL (ref 82–98)
MONOCYTES # BLD AUTO: 0.65 THOUSAND/ΜL (ref 0.17–1.22)
MONOCYTES NFR BLD AUTO: 9 % (ref 4–12)
NEUTROPHILS # BLD AUTO: 3.99 THOUSANDS/ΜL (ref 1.85–7.62)
NEUTS SEG NFR BLD AUTO: 55 % (ref 43–75)
NRBC BLD AUTO-RTO: 0 /100 WBCS
PLATELET # BLD AUTO: 252 THOUSANDS/UL (ref 149–390)
PMV BLD AUTO: 10.6 FL (ref 8.9–12.7)
POTASSIUM SERPL-SCNC: 4.1 MMOL/L (ref 3.5–5.3)
PROT SERPL-MCNC: 7.1 G/DL (ref 6.4–8.2)
PSA SERPL-MCNC: 1.2 NG/ML (ref 0–4)
RBC # BLD AUTO: 4.84 MILLION/UL (ref 3.88–5.62)
SODIUM SERPL-SCNC: 141 MMOL/L (ref 136–145)
TRIGL SERPL-MCNC: 195 MG/DL
WBC # BLD AUTO: 7.28 THOUSAND/UL (ref 4.31–10.16)

## 2020-07-09 PROCEDURE — 80053 COMPREHEN METABOLIC PANEL: CPT

## 2020-07-09 PROCEDURE — 85025 COMPLETE CBC W/AUTO DIFF WBC: CPT | Performed by: INTERNAL MEDICINE

## 2020-07-09 PROCEDURE — G0328 FECAL BLOOD SCRN IMMUNOASSAY: HCPCS

## 2020-07-09 PROCEDURE — 80061 LIPID PANEL: CPT

## 2020-07-09 PROCEDURE — 36415 COLL VENOUS BLD VENIPUNCTURE: CPT | Performed by: INTERNAL MEDICINE

## 2020-07-09 PROCEDURE — G0103 PSA SCREENING: HCPCS | Performed by: INTERNAL MEDICINE

## 2020-07-10 ENCOUNTER — TELEPHONE (OUTPATIENT)
Dept: INTERNAL MEDICINE CLINIC | Facility: CLINIC | Age: 70
End: 2020-07-10

## 2020-11-03 ENCOUNTER — APPOINTMENT (EMERGENCY)
Dept: CT IMAGING | Facility: HOSPITAL | Age: 70
End: 2020-11-03
Payer: COMMERCIAL

## 2020-11-03 ENCOUNTER — HOSPITAL ENCOUNTER (EMERGENCY)
Facility: HOSPITAL | Age: 70
Discharge: HOME | End: 2020-11-03
Attending: EMERGENCY MEDICINE | Admitting: EMERGENCY MEDICINE
Payer: COMMERCIAL

## 2020-11-03 VITALS
HEART RATE: 62 BPM | RESPIRATION RATE: 18 BRPM | SYSTOLIC BLOOD PRESSURE: 146 MMHG | OXYGEN SATURATION: 98 % | DIASTOLIC BLOOD PRESSURE: 84 MMHG | BODY MASS INDEX: 27.09 KG/M2 | WEIGHT: 200 LBS | TEMPERATURE: 97.4 F | HEIGHT: 72 IN

## 2020-11-03 DIAGNOSIS — N20.1 URETEROLITHIASIS: Primary | ICD-10-CM

## 2020-11-03 LAB
ANION GAP SERPL CALCULATED.3IONS-SCNC: 10 MMOL/L (ref 4–13)
BACTERIA UR QL AUTO: ABNORMAL /HPF
BASOPHILS # BLD AUTO: 0.03 THOUSANDS/ΜL (ref 0–0.1)
BASOPHILS NFR BLD AUTO: 0 % (ref 0–1)
BILIRUB UR QL STRIP: ABNORMAL
BUN SERPL-MCNC: 15 MG/DL (ref 5–25)
CALCIUM SERPL-MCNC: 8.7 MG/DL (ref 8.3–10.1)
CHLORIDE SERPL-SCNC: 106 MMOL/L (ref 100–108)
CLARITY UR: ABNORMAL
CO2 SERPL-SCNC: 24 MMOL/L (ref 21–32)
COLOR UR: YELLOW
CREAT SERPL-MCNC: 1.1 MG/DL (ref 0.6–1.3)
EOSINOPHIL # BLD AUTO: 0.04 THOUSAND/ΜL (ref 0–0.61)
EOSINOPHIL NFR BLD AUTO: 0 % (ref 0–6)
ERYTHROCYTE [DISTWIDTH] IN BLOOD BY AUTOMATED COUNT: 13.2 % (ref 11.6–15.1)
GFR SERPL CREATININE-BSD FRML MDRD: 69 ML/MIN/1.73SQ M
GLUCOSE SERPL-MCNC: 99 MG/DL (ref 65–140)
GLUCOSE UR STRIP-MCNC: NEGATIVE MG/DL
HCT VFR BLD AUTO: 42.5 % (ref 36.5–49.3)
HGB BLD-MCNC: 14 G/DL (ref 12–17)
HGB UR QL STRIP.AUTO: ABNORMAL
IMM GRANULOCYTES # BLD AUTO: 0.03 THOUSAND/UL (ref 0–0.2)
IMM GRANULOCYTES NFR BLD AUTO: 0 % (ref 0–2)
KETONES UR STRIP-MCNC: ABNORMAL MG/DL
LEUKOCYTE ESTERASE UR QL STRIP: NEGATIVE
LYMPHOCYTES # BLD AUTO: 1.2 THOUSANDS/ΜL (ref 0.6–4.47)
LYMPHOCYTES NFR BLD AUTO: 11 % (ref 14–44)
MCH RBC QN AUTO: 29.2 PG (ref 26.8–34.3)
MCHC RBC AUTO-ENTMCNC: 32.9 G/DL (ref 31.4–37.4)
MCV RBC AUTO: 89 FL (ref 82–98)
MONOCYTES # BLD AUTO: 0.86 THOUSAND/ΜL (ref 0.17–1.22)
MONOCYTES NFR BLD AUTO: 8 % (ref 4–12)
NEUTROPHILS # BLD AUTO: 8.37 THOUSANDS/ΜL (ref 1.85–7.62)
NEUTS SEG NFR BLD AUTO: 81 % (ref 43–75)
NITRITE UR QL STRIP: NEGATIVE
NON-SQ EPI CELLS URNS QL MICRO: ABNORMAL /HPF
NRBC BLD AUTO-RTO: 0 /100 WBCS
PH UR STRIP.AUTO: 5 [PH]
PLATELET # BLD AUTO: 250 THOUSANDS/UL (ref 149–390)
PMV BLD AUTO: 10 FL (ref 8.9–12.7)
POTASSIUM SERPL-SCNC: 4.1 MMOL/L (ref 3.5–5.3)
PROT UR STRIP-MCNC: ABNORMAL MG/DL
RBC # BLD AUTO: 4.79 MILLION/UL (ref 3.88–5.62)
RBC #/AREA URNS AUTO: ABNORMAL /HPF
SODIUM SERPL-SCNC: 140 MMOL/L (ref 136–145)
SP GR UR STRIP.AUTO: >=1.03 (ref 1–1.03)
UROBILINOGEN UR QL STRIP.AUTO: 0.2 E.U./DL
WBC # BLD AUTO: 10.53 THOUSAND/UL (ref 4.31–10.16)
WBC #/AREA URNS AUTO: ABNORMAL /HPF

## 2020-11-03 PROCEDURE — G1004 CDSM NDSC: HCPCS

## 2020-11-03 PROCEDURE — 99284 EMERGENCY DEPT VISIT MOD MDM: CPT

## 2020-11-03 PROCEDURE — 36415 COLL VENOUS BLD VENIPUNCTURE: CPT | Performed by: EMERGENCY MEDICINE

## 2020-11-03 PROCEDURE — 96374 THER/PROPH/DIAG INJ IV PUSH: CPT

## 2020-11-03 PROCEDURE — 99285 EMERGENCY DEPT VISIT HI MDM: CPT | Performed by: EMERGENCY MEDICINE

## 2020-11-03 PROCEDURE — 81001 URINALYSIS AUTO W/SCOPE: CPT

## 2020-11-03 PROCEDURE — 74176 CT ABD & PELVIS W/O CONTRAST: CPT

## 2020-11-03 PROCEDURE — 85025 COMPLETE CBC W/AUTO DIFF WBC: CPT | Performed by: EMERGENCY MEDICINE

## 2020-11-03 PROCEDURE — 96375 TX/PRO/DX INJ NEW DRUG ADDON: CPT

## 2020-11-03 PROCEDURE — 80048 BASIC METABOLIC PNL TOTAL CA: CPT | Performed by: EMERGENCY MEDICINE

## 2020-11-03 RX ORDER — MORPHINE SULFATE 4 MG/ML
4 INJECTION, SOLUTION INTRAMUSCULAR; INTRAVENOUS ONCE
Status: COMPLETED | OUTPATIENT
Start: 2020-11-03 | End: 2020-11-03

## 2020-11-03 RX ORDER — NAPROXEN 500 MG/1
500 TABLET ORAL 2 TIMES DAILY PRN
Qty: 15 TABLET | Refills: 0 | Status: SHIPPED | OUTPATIENT
Start: 2020-11-03 | End: 2021-06-10 | Stop reason: HOSPADM

## 2020-11-03 RX ORDER — NAPROXEN 500 MG/1
500 TABLET ORAL 2 TIMES DAILY PRN
Qty: 15 TABLET | Refills: 0 | Status: SHIPPED | OUTPATIENT
Start: 2020-11-03 | End: 2020-11-03 | Stop reason: SDUPTHER

## 2020-11-03 RX ORDER — ONDANSETRON 4 MG/1
4 TABLET, ORALLY DISINTEGRATING ORAL EVERY 8 HOURS PRN
Qty: 10 TABLET | Refills: 0 | Status: SHIPPED | OUTPATIENT
Start: 2020-11-03 | End: 2020-11-03 | Stop reason: SDUPTHER

## 2020-11-03 RX ORDER — MORPHINE SULFATE 15 MG/1
15 TABLET ORAL EVERY 4 HOURS PRN
Qty: 15 TABLET | Refills: 0 | Status: SHIPPED | OUTPATIENT
Start: 2020-11-03 | End: 2020-11-03 | Stop reason: SDUPTHER

## 2020-11-03 RX ORDER — ONDANSETRON 2 MG/ML
4 INJECTION INTRAMUSCULAR; INTRAVENOUS ONCE
Status: COMPLETED | OUTPATIENT
Start: 2020-11-03 | End: 2020-11-03

## 2020-11-03 RX ORDER — TAMSULOSIN HYDROCHLORIDE 0.4 MG/1
0.4 CAPSULE ORAL
Qty: 5 CAPSULE | Refills: 0 | Status: SHIPPED | OUTPATIENT
Start: 2020-11-03 | End: 2020-11-03 | Stop reason: SDUPTHER

## 2020-11-03 RX ORDER — MORPHINE SULFATE 15 MG/1
15 TABLET ORAL EVERY 4 HOURS PRN
Qty: 15 TABLET | Refills: 0 | Status: SHIPPED | OUTPATIENT
Start: 2020-11-03 | End: 2021-06-10 | Stop reason: HOSPADM

## 2020-11-03 RX ORDER — ONDANSETRON 4 MG/1
4 TABLET, ORALLY DISINTEGRATING ORAL EVERY 8 HOURS PRN
Qty: 10 TABLET | Refills: 0 | Status: SHIPPED | OUTPATIENT
Start: 2020-11-03 | End: 2021-06-10 | Stop reason: HOSPADM

## 2020-11-03 RX ORDER — KETOROLAC TROMETHAMINE 30 MG/ML
15 INJECTION, SOLUTION INTRAMUSCULAR; INTRAVENOUS ONCE
Status: COMPLETED | OUTPATIENT
Start: 2020-11-03 | End: 2020-11-03

## 2020-11-03 RX ORDER — TAMSULOSIN HYDROCHLORIDE 0.4 MG/1
0.4 CAPSULE ORAL
Qty: 5 CAPSULE | Refills: 0 | Status: SHIPPED | OUTPATIENT
Start: 2020-11-03 | End: 2021-06-10 | Stop reason: HOSPADM

## 2020-11-03 RX ADMIN — MORPHINE SULFATE 4 MG: 4 INJECTION INTRAVENOUS at 12:59

## 2020-11-03 RX ADMIN — KETOROLAC TROMETHAMINE 15 MG: 30 INJECTION, SOLUTION INTRAMUSCULAR at 12:59

## 2020-11-03 RX ADMIN — ONDANSETRON 4 MG: 2 INJECTION INTRAMUSCULAR; INTRAVENOUS at 13:00

## 2020-11-10 ENCOUNTER — APPOINTMENT (EMERGENCY)
Dept: RADIOLOGY | Facility: HOSPITAL | Age: 70
End: 2020-11-10
Payer: COMMERCIAL

## 2020-11-10 ENCOUNTER — HOSPITAL ENCOUNTER (EMERGENCY)
Facility: HOSPITAL | Age: 70
Discharge: HOME | End: 2020-11-10
Attending: EMERGENCY MEDICINE | Admitting: EMERGENCY MEDICINE
Payer: COMMERCIAL

## 2020-11-10 ENCOUNTER — APPOINTMENT (EMERGENCY)
Dept: ULTRASOUND IMAGING | Facility: HOSPITAL | Age: 70
End: 2020-11-10
Payer: COMMERCIAL

## 2020-11-10 VITALS
OXYGEN SATURATION: 98 % | TEMPERATURE: 98 F | HEART RATE: 66 BPM | RESPIRATION RATE: 17 BRPM | BODY MASS INDEX: 27.09 KG/M2 | HEIGHT: 72 IN | SYSTOLIC BLOOD PRESSURE: 163 MMHG | WEIGHT: 200 LBS | DIASTOLIC BLOOD PRESSURE: 109 MMHG

## 2020-11-10 DIAGNOSIS — R10.9 RIGHT FLANK PAIN: ICD-10-CM

## 2020-11-10 DIAGNOSIS — N20.1 URETEROLITHIASIS: Primary | ICD-10-CM

## 2020-11-10 LAB
ALBUMIN SERPL BCP-MCNC: 3.3 G/DL (ref 3.5–5)
ALP SERPL-CCNC: 75 U/L (ref 46–116)
ALT SERPL W P-5'-P-CCNC: 24 U/L (ref 12–78)
ANION GAP SERPL CALCULATED.3IONS-SCNC: 11 MMOL/L (ref 4–13)
AST SERPL W P-5'-P-CCNC: 8 U/L (ref 5–45)
BACTERIA UR QL AUTO: ABNORMAL /HPF
BASOPHILS # BLD AUTO: 0.04 THOUSANDS/ΜL (ref 0–0.1)
BASOPHILS NFR BLD AUTO: 0 % (ref 0–1)
BILIRUB SERPL-MCNC: 0.49 MG/DL (ref 0.2–1)
BILIRUB UR QL STRIP: NEGATIVE
BUN SERPL-MCNC: 23 MG/DL (ref 5–25)
CALCIUM ALBUM COR SERPL-MCNC: 9.6 MG/DL (ref 8.3–10.1)
CALCIUM SERPL-MCNC: 9 MG/DL (ref 8.3–10.1)
CHLORIDE SERPL-SCNC: 103 MMOL/L (ref 100–108)
CLARITY UR: CLEAR
CO2 SERPL-SCNC: 25 MMOL/L (ref 21–32)
COLOR UR: YELLOW
CREAT SERPL-MCNC: 1.25 MG/DL (ref 0.6–1.3)
EOSINOPHIL # BLD AUTO: 0.07 THOUSAND/ΜL (ref 0–0.61)
EOSINOPHIL NFR BLD AUTO: 1 % (ref 0–6)
ERYTHROCYTE [DISTWIDTH] IN BLOOD BY AUTOMATED COUNT: 13.1 % (ref 11.6–15.1)
GFR SERPL CREATININE-BSD FRML MDRD: 59 ML/MIN/1.73SQ M
GLUCOSE SERPL-MCNC: 108 MG/DL (ref 65–140)
GLUCOSE UR STRIP-MCNC: NEGATIVE MG/DL
HCT VFR BLD AUTO: 41 % (ref 36.5–49.3)
HGB BLD-MCNC: 13.2 G/DL (ref 12–17)
HGB UR QL STRIP.AUTO: ABNORMAL
IMM GRANULOCYTES # BLD AUTO: 0.06 THOUSAND/UL (ref 0–0.2)
IMM GRANULOCYTES NFR BLD AUTO: 1 % (ref 0–2)
KETONES UR STRIP-MCNC: ABNORMAL MG/DL
LEUKOCYTE ESTERASE UR QL STRIP: ABNORMAL
LYMPHOCYTES # BLD AUTO: 0.92 THOUSANDS/ΜL (ref 0.6–4.47)
LYMPHOCYTES NFR BLD AUTO: 9 % (ref 14–44)
MCH RBC QN AUTO: 28.8 PG (ref 26.8–34.3)
MCHC RBC AUTO-ENTMCNC: 32.2 G/DL (ref 31.4–37.4)
MCV RBC AUTO: 90 FL (ref 82–98)
MONOCYTES # BLD AUTO: 1.13 THOUSAND/ΜL (ref 0.17–1.22)
MONOCYTES NFR BLD AUTO: 11 % (ref 4–12)
NEUTROPHILS # BLD AUTO: 8.36 THOUSANDS/ΜL (ref 1.85–7.62)
NEUTS SEG NFR BLD AUTO: 78 % (ref 43–75)
NITRITE UR QL STRIP: NEGATIVE
NON-SQ EPI CELLS URNS QL MICRO: ABNORMAL /HPF
NRBC BLD AUTO-RTO: 0 /100 WBCS
PH UR STRIP.AUTO: 5.5 [PH] (ref 4.5–8)
PLATELET # BLD AUTO: 288 THOUSANDS/UL (ref 149–390)
PMV BLD AUTO: 9.8 FL (ref 8.9–12.7)
POTASSIUM SERPL-SCNC: 4.1 MMOL/L (ref 3.5–5.3)
PROT SERPL-MCNC: 7.5 G/DL (ref 6.4–8.2)
PROT UR STRIP-MCNC: ABNORMAL MG/DL
RBC # BLD AUTO: 4.58 MILLION/UL (ref 3.88–5.62)
RBC #/AREA URNS AUTO: ABNORMAL /HPF
SODIUM SERPL-SCNC: 139 MMOL/L (ref 136–145)
SP GR UR STRIP.AUTO: 1.02 (ref 1–1.03)
UROBILINOGEN UR QL STRIP.AUTO: 0.2 E.U./DL
WBC # BLD AUTO: 10.58 THOUSAND/UL (ref 4.31–10.16)
WBC #/AREA URNS AUTO: ABNORMAL /HPF

## 2020-11-10 PROCEDURE — 76770 US EXAM ABDO BACK WALL COMP: CPT

## 2020-11-10 PROCEDURE — 81001 URINALYSIS AUTO W/SCOPE: CPT

## 2020-11-10 PROCEDURE — 96360 HYDRATION IV INFUSION INIT: CPT

## 2020-11-10 PROCEDURE — 36415 COLL VENOUS BLD VENIPUNCTURE: CPT | Performed by: EMERGENCY MEDICINE

## 2020-11-10 PROCEDURE — 99284 EMERGENCY DEPT VISIT MOD MDM: CPT

## 2020-11-10 PROCEDURE — 80053 COMPREHEN METABOLIC PANEL: CPT | Performed by: EMERGENCY MEDICINE

## 2020-11-10 PROCEDURE — 74018 RADEX ABDOMEN 1 VIEW: CPT

## 2020-11-10 PROCEDURE — 85025 COMPLETE CBC W/AUTO DIFF WBC: CPT | Performed by: EMERGENCY MEDICINE

## 2020-11-10 PROCEDURE — 99284 EMERGENCY DEPT VISIT MOD MDM: CPT | Performed by: EMERGENCY MEDICINE

## 2020-11-10 RX ORDER — NAPROXEN 250 MG/1
500 TABLET ORAL ONCE
Status: COMPLETED | OUTPATIENT
Start: 2020-11-10 | End: 2020-11-10

## 2020-11-10 RX ADMIN — SODIUM CHLORIDE 500 ML: 0.9 INJECTION, SOLUTION INTRAVENOUS at 15:10

## 2020-11-10 RX ADMIN — NAPROXEN 500 MG: 250 TABLET ORAL at 15:16

## 2020-11-11 ENCOUNTER — TELEPHONE (OUTPATIENT)
Dept: UROLOGY | Facility: MEDICAL CENTER | Age: 70
End: 2020-11-11

## 2020-11-12 ENCOUNTER — OFFICE VISIT (OUTPATIENT)
Dept: UROLOGY | Facility: CLINIC | Age: 70
End: 2020-11-12
Payer: COMMERCIAL

## 2020-11-12 VITALS
BODY MASS INDEX: 26.82 KG/M2 | HEART RATE: 77 BPM | WEIGHT: 198 LBS | DIASTOLIC BLOOD PRESSURE: 84 MMHG | SYSTOLIC BLOOD PRESSURE: 124 MMHG | TEMPERATURE: 97.2 F | HEIGHT: 72 IN

## 2020-11-12 DIAGNOSIS — N20.0 NEPHROLITHIASIS: Primary | ICD-10-CM

## 2020-11-12 DIAGNOSIS — K76.0 FATTY LIVER: ICD-10-CM

## 2020-11-12 LAB
SL AMB  POCT GLUCOSE, UA: NORMAL
SL AMB LEUKOCYTE ESTERASE,UA: NORMAL
SL AMB POCT BILIRUBIN,UA: NORMAL
SL AMB POCT BLOOD,UA: NORMAL
SL AMB POCT CLARITY,UA: NORMAL
SL AMB POCT COLOR,UA: YELLOW
SL AMB POCT KETONES,UA: 5
SL AMB POCT NITRITE,UA: NORMAL
SL AMB POCT PH,UA: 5
SL AMB POCT SPECIFIC GRAVITY,UA: 1.03
SL AMB POCT URINE PROTEIN: NORMAL
SL AMB POCT UROBILINOGEN: NORMAL

## 2020-11-12 PROCEDURE — 87086 URINE CULTURE/COLONY COUNT: CPT | Performed by: PHYSICIAN ASSISTANT

## 2020-11-12 PROCEDURE — 99214 OFFICE O/P EST MOD 30 MIN: CPT | Performed by: PHYSICIAN ASSISTANT

## 2020-11-12 PROCEDURE — 81002 URINALYSIS NONAUTO W/O SCOPE: CPT | Performed by: PHYSICIAN ASSISTANT

## 2020-11-13 ENCOUNTER — OFFICE VISIT (OUTPATIENT)
Dept: LAB | Facility: CLINIC | Age: 70
End: 2020-11-13
Payer: COMMERCIAL

## 2020-11-13 ENCOUNTER — TELEPHONE (OUTPATIENT)
Dept: UROLOGY | Facility: AMBULATORY SURGERY CENTER | Age: 70
End: 2020-11-13

## 2020-11-13 ENCOUNTER — PREP FOR PROCEDURE (OUTPATIENT)
Dept: UROLOGY | Facility: AMBULATORY SURGERY CENTER | Age: 70
End: 2020-11-13

## 2020-11-13 ENCOUNTER — TRANSCRIBE ORDERS (OUTPATIENT)
Dept: LAB | Facility: CLINIC | Age: 70
End: 2020-11-13

## 2020-11-13 DIAGNOSIS — Z01.818 PREOP EXAMINATION: ICD-10-CM

## 2020-11-13 DIAGNOSIS — N20.0 NEPHROLITHIASIS: Primary | ICD-10-CM

## 2020-11-13 DIAGNOSIS — Z01.810 PRE-OPERATIVE CARDIOVASCULAR EXAMINATION: ICD-10-CM

## 2020-11-13 DIAGNOSIS — N20.0 NEPHROLITHIASIS: ICD-10-CM

## 2020-11-13 DIAGNOSIS — Z11.59 SCREENING FOR VIRAL DISEASE: ICD-10-CM

## 2020-11-13 LAB
ATRIAL RATE: 66 BPM
BACTERIA UR CULT: NORMAL
P AXIS: 73 DEGREES
PR INTERVAL: 182 MS
QRS AXIS: 35 DEGREES
QRSD INTERVAL: 88 MS
QT INTERVAL: 396 MS
QTC INTERVAL: 415 MS
T WAVE AXIS: 80 DEGREES
VENTRICULAR RATE: 66 BPM

## 2020-11-13 PROCEDURE — 93005 ELECTROCARDIOGRAM TRACING: CPT

## 2020-11-13 PROCEDURE — U0003 INFECTIOUS AGENT DETECTION BY NUCLEIC ACID (DNA OR RNA); SEVERE ACUTE RESPIRATORY SYNDROME CORONAVIRUS 2 (SARS-COV-2) (CORONAVIRUS DISEASE [COVID-19]), AMPLIFIED PROBE TECHNIQUE, MAKING USE OF HIGH THROUGHPUT TECHNOLOGIES AS DESCRIBED BY CMS-2020-01-R: HCPCS | Performed by: UROLOGY

## 2020-11-13 PROCEDURE — 93010 ELECTROCARDIOGRAM REPORT: CPT | Performed by: INTERNAL MEDICINE

## 2020-11-14 ENCOUNTER — ANESTHESIA EVENT (OUTPATIENT)
Dept: PERIOP | Facility: AMBULARY SURGERY CENTER | Age: 70
End: 2020-11-14
Payer: COMMERCIAL

## 2020-11-14 LAB — SARS-COV-2 RNA SPEC QL NAA+PROBE: NOT DETECTED

## 2020-11-19 ENCOUNTER — APPOINTMENT (OUTPATIENT)
Dept: RADIOLOGY | Facility: AMBULARY SURGERY CENTER | Age: 70
End: 2020-11-19
Payer: COMMERCIAL

## 2020-11-19 ENCOUNTER — ANESTHESIA (OUTPATIENT)
Dept: PERIOP | Facility: AMBULARY SURGERY CENTER | Age: 70
End: 2020-11-19
Payer: COMMERCIAL

## 2020-11-19 ENCOUNTER — HOSPITAL ENCOUNTER (OUTPATIENT)
Facility: AMBULARY SURGERY CENTER | Age: 70
Setting detail: OUTPATIENT SURGERY
Discharge: HOME | End: 2020-11-19
Attending: UROLOGY | Admitting: UROLOGY
Payer: COMMERCIAL

## 2020-11-19 VITALS
WEIGHT: 198 LBS | TEMPERATURE: 97.3 F | RESPIRATION RATE: 18 BRPM | HEART RATE: 77 BPM | HEIGHT: 72 IN | SYSTOLIC BLOOD PRESSURE: 142 MMHG | BODY MASS INDEX: 26.82 KG/M2 | DIASTOLIC BLOOD PRESSURE: 89 MMHG | OXYGEN SATURATION: 97 %

## 2020-11-19 VITALS — HEART RATE: 81 BPM

## 2020-11-19 DIAGNOSIS — N20.0 NEPHROLITHIASIS: ICD-10-CM

## 2020-11-19 DIAGNOSIS — N20.1 LEFT URETERAL CALCULUS: Primary | ICD-10-CM

## 2020-11-19 PROCEDURE — 87086 URINE CULTURE/COLONY COUNT: CPT | Performed by: UROLOGY

## 2020-11-19 PROCEDURE — 74420 UROGRAPHY RTRGR +-KUB: CPT

## 2020-11-19 PROCEDURE — 52356 CYSTO/URETERO W/LITHOTRIPSY: CPT | Performed by: UROLOGY

## 2020-11-19 PROCEDURE — NC001 PR NO CHARGE: Performed by: UROLOGY

## 2020-11-19 PROCEDURE — C1769 GUIDE WIRE: HCPCS | Performed by: UROLOGY

## 2020-11-19 PROCEDURE — C1758 CATHETER, URETERAL: HCPCS | Performed by: UROLOGY

## 2020-11-19 PROCEDURE — C2617 STENT, NON-COR, TEM W/O DEL: HCPCS | Performed by: UROLOGY

## 2020-11-19 DEVICE — STENT URETERAL 6 FR 26CM INLAY OPTIMA: Type: IMPLANTABLE DEVICE | Status: FUNCTIONAL

## 2020-11-19 RX ORDER — MAGNESIUM HYDROXIDE 1200 MG/15ML
LIQUID ORAL AS NEEDED
Status: DISCONTINUED | OUTPATIENT
Start: 2020-11-19 | End: 2020-11-19 | Stop reason: HOSPADM

## 2020-11-19 RX ORDER — MEPERIDINE HYDROCHLORIDE 25 MG/ML
25 INJECTION INTRAMUSCULAR; INTRAVENOUS; SUBCUTANEOUS ONCE AS NEEDED
Status: DISCONTINUED | OUTPATIENT
Start: 2020-11-19 | End: 2020-11-19 | Stop reason: HOSPADM

## 2020-11-19 RX ORDER — FENTANYL CITRATE/PF 50 MCG/ML
25 SYRINGE (ML) INJECTION
Status: DISCONTINUED | OUTPATIENT
Start: 2020-11-19 | End: 2020-11-19 | Stop reason: HOSPADM

## 2020-11-19 RX ORDER — HYDROCODONE BITARTRATE AND ACETAMINOPHEN 5; 325 MG/1; MG/1
2 TABLET ORAL EVERY 4 HOURS PRN
Status: DISCONTINUED | OUTPATIENT
Start: 2020-11-19 | End: 2020-11-19 | Stop reason: HOSPADM

## 2020-11-19 RX ORDER — CEFAZOLIN SODIUM 2 G/50ML
2000 SOLUTION INTRAVENOUS ONCE
Status: COMPLETED | OUTPATIENT
Start: 2020-11-19 | End: 2020-11-19

## 2020-11-19 RX ORDER — PROPOFOL 10 MG/ML
INJECTION, EMULSION INTRAVENOUS AS NEEDED
Status: DISCONTINUED | OUTPATIENT
Start: 2020-11-19 | End: 2020-11-19

## 2020-11-19 RX ORDER — HYDROCODONE BITARTRATE AND ACETAMINOPHEN 5; 325 MG/1; MG/1
2 TABLET ORAL EVERY 6 HOURS PRN
Qty: 6 TABLET | Refills: 0 | Status: SHIPPED | OUTPATIENT
Start: 2020-11-19 | End: 2020-11-29

## 2020-11-19 RX ORDER — SODIUM CHLORIDE, SODIUM LACTATE, POTASSIUM CHLORIDE, CALCIUM CHLORIDE 600; 310; 30; 20 MG/100ML; MG/100ML; MG/100ML; MG/100ML
INJECTION, SOLUTION INTRAVENOUS CONTINUOUS PRN
Status: DISCONTINUED | OUTPATIENT
Start: 2020-11-19 | End: 2020-11-19

## 2020-11-19 RX ORDER — LIDOCAINE HYDROCHLORIDE 10 MG/ML
0.5 INJECTION, SOLUTION EPIDURAL; INFILTRATION; INTRACAUDAL; PERINEURAL ONCE AS NEEDED
Status: DISCONTINUED | OUTPATIENT
Start: 2020-11-19 | End: 2020-11-19 | Stop reason: HOSPADM

## 2020-11-19 RX ORDER — DEXAMETHASONE SODIUM PHOSPHATE 4 MG/ML
INJECTION, SOLUTION INTRA-ARTICULAR; INTRALESIONAL; INTRAMUSCULAR; INTRAVENOUS; SOFT TISSUE AS NEEDED
Status: DISCONTINUED | OUTPATIENT
Start: 2020-11-19 | End: 2020-11-19

## 2020-11-19 RX ORDER — FENTANYL CITRATE 50 UG/ML
INJECTION, SOLUTION INTRAMUSCULAR; INTRAVENOUS AS NEEDED
Status: DISCONTINUED | OUTPATIENT
Start: 2020-11-19 | End: 2020-11-19

## 2020-11-19 RX ORDER — ONDANSETRON 2 MG/ML
INJECTION INTRAMUSCULAR; INTRAVENOUS AS NEEDED
Status: DISCONTINUED | OUTPATIENT
Start: 2020-11-19 | End: 2020-11-19

## 2020-11-19 RX ORDER — SODIUM CHLORIDE, SODIUM LACTATE, POTASSIUM CHLORIDE, CALCIUM CHLORIDE 600; 310; 30; 20 MG/100ML; MG/100ML; MG/100ML; MG/100ML
125 INJECTION, SOLUTION INTRAVENOUS CONTINUOUS
Status: DISCONTINUED | OUTPATIENT
Start: 2020-11-19 | End: 2020-11-19 | Stop reason: HOSPADM

## 2020-11-19 RX ORDER — CEPHALEXIN 500 MG/1
500 CAPSULE ORAL 3 TIMES DAILY
Qty: 9 CAPSULE | Refills: 0 | Status: SHIPPED | OUTPATIENT
Start: 2020-11-19 | End: 2020-11-22

## 2020-11-19 RX ORDER — LIDOCAINE HYDROCHLORIDE 10 MG/ML
INJECTION, SOLUTION EPIDURAL; INFILTRATION; INTRACAUDAL; PERINEURAL AS NEEDED
Status: DISCONTINUED | OUTPATIENT
Start: 2020-11-19 | End: 2020-11-19

## 2020-11-19 RX ADMIN — PROPOFOL 180 MG: 10 INJECTION, EMULSION INTRAVENOUS at 09:13

## 2020-11-19 RX ADMIN — PROPOFOL 30 MG: 10 INJECTION, EMULSION INTRAVENOUS at 10:37

## 2020-11-19 RX ADMIN — FENTANYL CITRATE 25 MCG: 50 INJECTION INTRAMUSCULAR; INTRAVENOUS at 11:01

## 2020-11-19 RX ADMIN — ONDANSETRON 4 MG: 2 INJECTION INTRAMUSCULAR; INTRAVENOUS at 09:16

## 2020-11-19 RX ADMIN — FENTANYL CITRATE 25 MCG: 50 INJECTION, SOLUTION INTRAMUSCULAR; INTRAVENOUS at 09:29

## 2020-11-19 RX ADMIN — FENTANYL CITRATE 25 MCG: 50 INJECTION INTRAMUSCULAR; INTRAVENOUS at 11:04

## 2020-11-19 RX ADMIN — FENTANYL CITRATE 50 MCG: 50 INJECTION, SOLUTION INTRAMUSCULAR; INTRAVENOUS at 10:34

## 2020-11-19 RX ADMIN — FENTANYL CITRATE 25 MCG: 50 INJECTION, SOLUTION INTRAMUSCULAR; INTRAVENOUS at 09:33

## 2020-11-19 RX ADMIN — SODIUM CHLORIDE, SODIUM LACTATE, POTASSIUM CHLORIDE, AND CALCIUM CHLORIDE: .6; .31; .03; .02 INJECTION, SOLUTION INTRAVENOUS at 08:08

## 2020-11-19 RX ADMIN — DEXAMETHASONE SODIUM PHOSPHATE 4 MG: 4 INJECTION, SOLUTION INTRAMUSCULAR; INTRAVENOUS at 09:16

## 2020-11-19 RX ADMIN — HYDROCODONE BITARTRATE AND ACETAMINOPHEN 1 TABLET: 5; 325 TABLET ORAL at 12:01

## 2020-11-19 RX ADMIN — LIDOCAINE HYDROCHLORIDE 50 MG: 10 INJECTION, SOLUTION EPIDURAL; INFILTRATION; INTRACAUDAL at 09:13

## 2020-11-19 RX ADMIN — CEFAZOLIN SODIUM 2000 MG: 2 SOLUTION INTRAVENOUS at 09:04

## 2020-11-20 LAB — BACTERIA UR CULT: NORMAL

## 2020-11-24 ENCOUNTER — TELEPHONE (OUTPATIENT)
Dept: UROLOGY | Facility: AMBULATORY SURGERY CENTER | Age: 70
End: 2020-11-24

## 2020-11-24 DIAGNOSIS — N20.0 NEPHROLITHIASIS: Primary | ICD-10-CM

## 2021-02-12 ENCOUNTER — TELEPHONE (OUTPATIENT)
Dept: UROLOGY | Facility: AMBULATORY SURGERY CENTER | Age: 71
End: 2021-02-12

## 2021-02-12 NOTE — TELEPHONE ENCOUNTER
Patient last seen Eloinajulio cesar Sharma), patient called in to provide insurance information:  SHEFALI Avalos  Member id# [id number]  [facility]# [id number]    Patient can be reached at [phone]

## 2021-02-25 ENCOUNTER — OFFICE VISIT (OUTPATIENT)
Dept: UROLOGY | Facility: CLINIC | Age: 71
End: 2021-02-25
Payer: COMMERCIAL

## 2021-02-25 VITALS
WEIGHT: 208 LBS | DIASTOLIC BLOOD PRESSURE: 68 MMHG | SYSTOLIC BLOOD PRESSURE: 118 MMHG | BODY MASS INDEX: 28.17 KG/M2 | HEART RATE: 76 BPM | HEIGHT: 72 IN

## 2021-02-25 DIAGNOSIS — N40.1 BPH WITH OBSTRUCTION/LOWER URINARY TRACT SYMPTOMS: Primary | ICD-10-CM

## 2021-02-25 DIAGNOSIS — N13.8 BPH WITH OBSTRUCTION/LOWER URINARY TRACT SYMPTOMS: Primary | ICD-10-CM

## 2021-02-25 DIAGNOSIS — N40.0 BPH WITHOUT OBSTRUCTION/LOWER URINARY TRACT SYMPTOMS: ICD-10-CM

## 2021-02-25 LAB — POST-VOID RESIDUAL VOLUME, ML POC: 0 ML

## 2021-02-25 PROCEDURE — 99213 OFFICE O/P EST LOW 20 MIN: CPT | Performed by: PHYSICIAN ASSISTANT

## 2021-02-25 PROCEDURE — 1160F RVW MEDS BY RX/DR IN RCRD: CPT | Performed by: PHYSICIAN ASSISTANT

## 2021-02-25 PROCEDURE — 51798 US URINE CAPACITY MEASURE: CPT | Performed by: PHYSICIAN ASSISTANT

## 2021-02-25 PROCEDURE — 3008F BODY MASS INDEX DOCD: CPT | Performed by: PHYSICIAN ASSISTANT

## 2021-02-25 NOTE — PROGRESS NOTES
UROLOGY PROGRESS NOTE   Patient Identifiers: Hans Yuan (MRN [id number])  Date of Service: 2/25/2021    Subjective:     44-year-old man history of BPH and kidney stones  He had a ureteroscopy in November with Dr Libby Alvarado  His PSA in July was 1  2  he is not on any prostate medications  He has frequency and nocturia and is interested in being evaluated for Urolift  His brother-in-law it in [place] he had the procedure and was satisfied  Reason for visit:  BPH and kidney stone follow-up     Objective:     VITALS:    There were no vitals filed for this visit          LABS:  Lab Results   Component Value Date    HGB 13 2 11/10/2020    HCT 41 0 11/10/2020    WBC 10 58 (H) 11/10/2020     11/10/2020   ]    Lab Results   Component Value Date     08/19/2015    K 4 1 11/10/2020     11/10/2020    CO2 25 11/10/2020    BUN 23 11/10/2020    CREATININE 1 25 11/10/2020    CALCIUM 9 0 11/10/2020    GLUCOSE 98 08/19/2015   ]        INPATIENT MEDS:    Current Outpatient Medications:     Ascorbic Acid (VITAMIN C PO), Take by mouth, Disp: , Rfl:     Cholecalciferol (VITAMIN D3) 2000 units capsule, Take by mouth, Disp: , Rfl:     Coenzyme Q10 (CO Q 10) 100 MG CAPS, Take by mouth, Disp: , Rfl:     cyanocobalamin (VITAMIN B-12) 1,000 mcg tablet, Take 3 tablets by mouth daily, Disp: , Rfl:     LUTEIN PO, Take 2 tablets by mouth daily, Disp: , Rfl:     morphine (MSIR) 15 mg tablet, Take 1 tablet (15 mg total) by mouth every 4 (four) hours as needed for severe pain for up to 15 dosesMax Daily Amount: 90 mg, Disp: 15 tablet, Rfl: 0    Multiple Vitamin (MULTIVITAMIN) tablet, Take 1 tablet by mouth daily, Disp: , Rfl:     naproxen (NAPROSYN) 500 mg tablet, Take 1 tablet (500 mg total) by mouth 2 (two) times a day as needed for mild pain for up to 15 doses, Disp: 15 tablet, Rfl: 0    Omega-3 Fatty Acids (FISH OIL) 1200 MG CPDR, Take by mouth, Disp: , Rfl:     ondansetron (ZOFRAN-ODT) 4 mg disintegrating tablet, Take 1 tablet (4 mg total) by mouth every 8 (eight) hours as needed for nausea or vomiting, Disp: 10 tablet, Rfl: 0    saccharomyces boulardii (FLORASTOR) 250 mg capsule, Take 250 mg by mouth 2 (two) times a day, Disp: , Rfl:     tamsulosin (FLOMAX) 0 4 mg, Take 1 capsule (0 4 mg total) by mouth daily with dinner for 5 doses, Disp: 5 capsule, Rfl: 0      Physical Exam:   There were no vitals taken for this visit  GEN: no acute distress    RESP: breathing comfortably with no accessory muscle use    ABD: soft, non-tender, non-distended   INCISION:    EXT: no significant peripheral edema       RADIOLOGY:     None     Assessment:    1   BPH     Plan:   - reviewed the Urolift procedure as well as TURP  - will schedule patient for a cystoscopy, uroflow and transrectal ultrasound in the office  -  -

## 2021-03-10 DIAGNOSIS — Z23 ENCOUNTER FOR IMMUNIZATION: ICD-10-CM

## 2021-03-17 ENCOUNTER — IMMUNIZATIONS (OUTPATIENT)
Dept: FAMILY MEDICINE CLINIC | Facility: HOSPITAL | Age: 71
End: 2021-03-17

## 2021-03-17 DIAGNOSIS — Z23 ENCOUNTER FOR IMMUNIZATION: Primary | ICD-10-CM

## 2021-03-17 PROCEDURE — 0001A SARS-COV-2 / COVID-19 MRNA VACCINE (PFIZER-BIONTECH) 30 MCG: CPT

## 2021-03-17 PROCEDURE — 91300 SARS-COV-2 / COVID-19 MRNA VACCINE (PFIZER-BIONTECH) 30 MCG: CPT

## 2021-04-09 ENCOUNTER — IMMUNIZATIONS (OUTPATIENT)
Dept: FAMILY MEDICINE CLINIC | Facility: HOSPITAL | Age: 71
End: 2021-04-09

## 2021-04-09 DIAGNOSIS — Z23 ENCOUNTER FOR IMMUNIZATION: Primary | ICD-10-CM

## 2021-04-09 PROCEDURE — 0002A SARS-COV-2 / COVID-19 MRNA VACCINE (PFIZER-BIONTECH) 30 MCG: CPT

## 2021-04-09 PROCEDURE — 91300 SARS-COV-2 / COVID-19 MRNA VACCINE (PFIZER-BIONTECH) 30 MCG: CPT

## 2021-06-10 ENCOUNTER — PROCEDURE VISIT (OUTPATIENT)
Dept: UROLOGY | Facility: CLINIC | Age: 71
End: 2021-06-10
Payer: COMMERCIAL

## 2021-06-10 ENCOUNTER — TELEPHONE (OUTPATIENT)
Dept: UROLOGY | Facility: CLINIC | Age: 71
End: 2021-06-10

## 2021-06-10 VITALS
HEIGHT: 72 IN | SYSTOLIC BLOOD PRESSURE: 128 MMHG | BODY MASS INDEX: 27.82 KG/M2 | HEART RATE: 71 BPM | WEIGHT: 205.4 LBS | DIASTOLIC BLOOD PRESSURE: 70 MMHG

## 2021-06-10 DIAGNOSIS — N20.1 LEFT URETERAL CALCULUS: ICD-10-CM

## 2021-06-10 DIAGNOSIS — N40.0 BPH WITHOUT OBSTRUCTION/LOWER URINARY TRACT SYMPTOMS: Primary | ICD-10-CM

## 2021-06-10 LAB — POST-VOID RESIDUAL VOLUME, ML POC: 46 ML

## 2021-06-10 PROCEDURE — 76872 US TRANSRECTAL: CPT | Performed by: UROLOGY

## 2021-06-10 PROCEDURE — 1036F TOBACCO NON-USER: CPT | Performed by: UROLOGY

## 2021-06-10 PROCEDURE — 3008F BODY MASS INDEX DOCD: CPT | Performed by: UROLOGY

## 2021-06-10 PROCEDURE — 52000 CYSTOURETHROSCOPY: CPT | Performed by: UROLOGY

## 2021-06-10 PROCEDURE — 99214 OFFICE O/P EST MOD 30 MIN: CPT | Performed by: UROLOGY

## 2021-06-10 PROCEDURE — 1160F RVW MEDS BY RX/DR IN RCRD: CPT | Performed by: UROLOGY

## 2021-06-10 PROCEDURE — 51798 US URINE CAPACITY MEASURE: CPT | Performed by: UROLOGY

## 2021-06-10 NOTE — TELEPHONE ENCOUNTER
Left message informing patient that the orders for a renal ultrasound and KUB were placed in Epic  Patient is to call central scheduling to schedule ultrasound  Central scheduling number provided

## 2021-06-10 NOTE — PROGRESS NOTES
Cystoscopy     Date/Time 6/10/2021 7:37 AM     Performed by  Key Ames MD     Authorized by Key Ames MD          Procedure Details:  Procedure type: cystoscopy      Office Cystoscopy Procedure Note    Indication:     medically refractory lower urinary tract symptoms     Informed consent   The risks, benefits, complications, treatment options, and expected outcomes were discussed with the patient  The patient concurred with the proposed plan and provided informed consent  Anesthesia  Lidocaine jelly 2%    Antibiotic prophylaxis   None    Procedure  The patient was placed in the supineposition, was prepped and draped in the usual manner using sterile technique, and 2% lidocaine jelly instilled into the urethra  A 17 F flexible cystoscope was then inserted into the urethra and the urethra and bladder carefully examined  The following findings were noted:    Findings:  Urethra:  Normal  Prostate:   mild bilobar hyperplasia with 65% kissing of the lateral lobes in the midline particularly anterior please  Bladder neck is just mildly elevated  There is no median lobe  There is no intravesical prostatic protrusion  Prostate anatomy would be highly amenable to the Uro lift procedure  Bladder:  Normal  Ureteral orifices:  Normal  Other findings:  None     Specimens: None                 Complications:    None; patient tolerated the procedure well           Disposition: To home after 30 minute observation  Condition: Stable    Office TRUS    Indication    Prostate volumetrics for surgical planning    Transrectal ultrasonography  The patient was placed in the left lateral decubitus position  After an attentive digital rectal examination, a 7 5 mHz sidefire ultrasound probe was gently inserted into the rectum and biplanar imaging of the prostate was done with the findings noted below  Images were taken of any abnormal findings and also to document prostate size      Bladder  The bladder base appeared normal     Prostate      Ultrasound size measurements:  -Volume:   52 cm3  - Width  6 1  cm  - Height:  3 4 cm  - Length:  4 8 cm    Ultrasound findings:  -Cysts: None  -Masses: None  -Median lobe: absent

## 2021-06-10 NOTE — H&P (VIEW-ONLY)
Referring Physician: Maggi Mcnally DO  A copy of this note was sent to the referring physician  Diagnoses and all orders for this visit:    BPH without obstruction/lower urinary tract symptoms  -     Cystoscopy  -     POCT Measure PVR  -     Case request operating room: [address] [place] [address]; Standing  -     Case request operating room: CYSTOSCOPY WITH INSERTION UROLIFT    Other orders  -     Diet NPO; Sips with meds; Standing  -     Place sequential compression device; Standing  -     ceFAZolin (ANCEF) 2,000 mg in dextrose 5 % 100 mL IVPB            Assessment and plan:       1  BPH  - bilobar hyperplasia  - patient desires surgical correction, he does not wish to initiate medical management    2  Nephrolithiasis history of ureteroscopy      We reviewed the options for treating BPH/LUTS which include but are not limited to expectant management, medical therapy, transurethral resection of prostate (TURP)  We also discussed minimally invasive options  At this point, the patient wishes to proceed with Urolift  This is a great option for the patient based on the following criteria:    - cystoscopy revealed  Bilobar hyperplasia  - estimated gland volume  52 g measured on transrectal ultrasound  - uroflow with  Maximum flow 5 0  - PVR with  46  - AUA SS  27  - Bother index:  four  - normal renal function  - no active urinary tract infection  - PSA:  1 2  - no documented allergy to nickel     The additional morbidity of standard surgical options (ie, TURP) is not necessary given the above criteria  The risks of Urolift include but are not limited to bleeding, infection, reaction to anesthesia such as heart attack, stroke, DVT/PE, hyponatremia, bladder neck contracture, urethral stricture, injury to surrounding structures (ureters, rectum, etc)    We discussed that additional risks of trans urethral resection procedures such as incontinence, retrograde ejaculation, and erectile dysfunction have been only rarely reported with the Uro lift procedure  We did discuss that this is a new were procedure and a permanent implant  We discussed that there may be some long-term implications that her on for seen with this newer technology, such as perhaps complicating treatment for prostate cancer if indicated down the line  Finally, I told him that he may require additional procedures secondary to some of these complications  Informed consent was obtained for the Uro lift procedure  This will be scheduled in the near future to be performed under IV sedation  In addition I have ordered a KUB renal ultrasound as a  Follow-up from his prior ureteroscopy    Vignesh Goodman MD      Chief Complaint      Uro lift evaluation      History of Present Illness     July Tapia is a 78 y o  Male presents for Uro lift evaluation  He is a very pleasant 82-year-old male with a history of mixed lower urinary tract symptoms  Primary source of bother includes nocturia as well as daytime frequency up to every 90 minutes  He also reports a diminished and weakened urinary stream in the occasional need to strain  Patient has been offered medical therapy in the past but prefers to avoid any pharmacotherapy  He is interested in minimally invasive surgical correction  His brother-in-law underwent a successful Uro lift in the [place] area  Patient comes repair today with a number of well informed questions that I answered to the best of my ability  He does have a history of nephrolithiasis having previously undergone ureteroscopy  Of note no follow-up imaging has been performed since that procedure  He is asymptomatic however    Detailed Urologic History     - please refer to HPI    Review of Systems     Review of Systems   Constitutional: Negative for activity change and fatigue  HENT: Negative for congestion  Eyes: Negative for visual disturbance     Respiratory: Negative for shortness of breath and wheezing  Cardiovascular: Negative for chest pain and leg swelling  Gastrointestinal: Negative for abdominal pain  Endocrine: Negative for polyuria  Genitourinary: Negative for dysuria, flank pain, hematuria and urgency  As per HPI   Musculoskeletal: Negative for back pain  Allergic/Immunologic: Negative for immunocompromised state  Neurological: Negative for dizziness and numbness  Psychiatric/Behavioral: Negative for dysphoric mood  All other systems reviewed and are negative  AUA SYMPTOM SCORE      Most Recent Value   AUA SYMPTOM SCORE   How often have you had a sensation of not emptying your bladder completely after you finished urinating? 2   How often have you had to urinate again less than two hours after you finished urinating? 5   How often have you found you stopped and started again several times when you urinate? 5   How often have you found it difficult to postpone urination? 3   How often have you had a weak urinary stream?  5   How often have you had to push or strain to begin urination? 4   How many times did you most typically get up to urinate from the time you went to bed at night until the time you got up in the morning? 3   Quality of Life: If you were to spend the rest of your life with your urinary condition just the way it is now, how would you feel about that?  4   AUA SYMPTOM SCORE  27            Allergies     No Known Allergies    Physical Exam     Physical Exam  Constitutional:       General: He is not in acute distress  Appearance: He is well-developed  HENT:      Head: Normocephalic and atraumatic  Neck:      Musculoskeletal: Normal range of motion  Cardiovascular:      Comments: Negative lower extremity edema  Pulmonary:      Effort: Pulmonary effort is normal       Breath sounds: Normal breath sounds  Abdominal:      Palpations: Abdomen is soft     Genitourinary:     Penis: Normal        Prostate: Normal    Musculoskeletal: Normal range of motion  Skin:     General: Skin is warm  Neurological:      Mental Status: He is alert and oriented to person, place, and time     Psychiatric:         Behavior: Behavior normal              Vital Signs  Vitals:    06/10/21 0809   BP: 128/70   BP Location: Left arm   Patient Position: Sitting   Cuff Size: Adult   Pulse: 71   Weight: 93 2 kg (205 lb 6 4 oz)   Height: 6' (1 829 m)         Current Medications       Current Outpatient Medications:     Ascorbic Acid (VITAMIN C PO), Take by mouth, Disp: , Rfl:     Cholecalciferol (VITAMIN D3) 2000 units capsule, Take by mouth, Disp: , Rfl:     Coenzyme Q10 (CO Q 10) 100 MG CAPS, Take by mouth, Disp: , Rfl:     cyanocobalamin (VITAMIN B-12) 1,000 mcg tablet, Take 3 tablets by mouth daily, Disp: , Rfl:     LUTEIN PO, Take 2 tablets by mouth daily, Disp: , Rfl:     Multiple Vitamin (MULTIVITAMIN) tablet, Take 1 tablet by mouth daily, Disp: , Rfl:     Omega-3 Fatty Acids (FISH OIL) 1200 MG CPDR, Take by mouth, Disp: , Rfl:       Active Problems     Patient Active Problem List   Diagnosis    Benign prostatic hyperplasia    Prostate cancer screening    History of nephrolithiasis    Fatty liver    Low vitamin D level    Lung nodule    [place] cardiac risk 10-20% in next 10 years    Left ureteral calculus         Past Medical History     Past Medical History:   Diagnosis Date    Kidney stone     isaias         Surgical History     Past Surgical History:   Procedure Laterality Date    COLONOSCOPY      FL RETROGRADE PYELOGRAM  11/19/2020    LITHOTRIPSY      LYMPH NODE BIOPSY  1999    cervical, path showerd toxoplasmosis, no treatment    OR CYSTO/URETERO W/LITHOTRIPSY &INDWELL STENT INSRT Left 11/19/2020    Procedure: CYSTOSCOPY URETEROSCOPY WITH LITHOTRIPSY HOLMIUM LASER, RIGHT AND LEFT RETROGRADE PYELOGRAM  AND INSERTION LEFT  STENT URETERAL;  Surgeon: Soren Martin MD;  Location: [facility];  Service: Urology    TONSILLECTOMY Family History     Family History   Problem Relation Age of Onset    Colon cancer Mother         dx late 57's    Migraines Mother     Cancer Mother 61        Liver or Colon cancer?  Lung cancer Father     Alcohol abuse Neg Hx     Substance Abuse Neg Hx     Mental illness Neg Hx     Depression Neg Hx          Social History     Social History     Social History     Tobacco Use   Smoking Status Never Smoker   Smokeless Tobacco Never Used         Pertinent Lab Values     Lab Results   Component Value Date    CREATININE 1 25 11/10/2020       Lab Results   Component Value Date    PSA 1 2 07/09/2020    PSA 1 2 07/05/2019    PSA 1 0 03/15/2018       @RESULTRCNT(1H])@      Pertinent Imaging      - n/a    Portions of the record may have been created with voice recognition software   Occasional wrong word or "sound a like" substitutions may have occurred due to the inherent limitations of voice recognition software   Read the chart carefully and recognize, using context, where substitutions have occurred

## 2021-06-10 NOTE — PROGRESS NOTES
Referring Physician: Srinivas Damon DO  A copy of this note was sent to the referring physician  Diagnoses and all orders for this visit:    BPH without obstruction/lower urinary tract symptoms  -     Cystoscopy  -     POCT Measure PVR  -     Case request operating room: [address] [place] [address]; Standing  -     Case request operating room: CYSTOSCOPY WITH INSERTION UROLIFT    Other orders  -     Diet NPO; Sips with meds; Standing  -     Place sequential compression device; Standing  -     ceFAZolin (ANCEF) 2,000 mg in dextrose 5 % 100 mL IVPB            Assessment and plan:       1  BPH  - bilobar hyperplasia  - patient desires surgical correction, he does not wish to initiate medical management    2  Nephrolithiasis history of ureteroscopy      We reviewed the options for treating BPH/LUTS which include but are not limited to expectant management, medical therapy, transurethral resection of prostate (TURP)  We also discussed minimally invasive options  At this point, the patient wishes to proceed with Urolift  This is a great option for the patient based on the following criteria:    - cystoscopy revealed  Bilobar hyperplasia  - estimated gland volume  52 g measured on transrectal ultrasound  - uroflow with  Maximum flow 5 0  - PVR with  46  - AUA SS  27  - Bother index:  four  - normal renal function  - no active urinary tract infection  - PSA:  1 2  - no documented allergy to nickel     The additional morbidity of standard surgical options (ie, TURP) is not necessary given the above criteria  The risks of Urolift include but are not limited to bleeding, infection, reaction to anesthesia such as heart attack, stroke, DVT/PE, hyponatremia, bladder neck contracture, urethral stricture, injury to surrounding structures (ureters, rectum, etc)    We discussed that additional risks of trans urethral resection procedures such as incontinence, retrograde ejaculation, and erectile dysfunction have been only rarely reported with the Uro lift procedure  We did discuss that this is a new were procedure and a permanent implant  We discussed that there may be some long-term implications that her on for seen with this newer technology, such as perhaps complicating treatment for prostate cancer if indicated down the line  Finally, I told him that he may require additional procedures secondary to some of these complications  Informed consent was obtained for the Uro lift procedure  This will be scheduled in the near future to be performed under IV sedation  In addition I have ordered a KUB renal ultrasound as a  Follow-up from his prior ureteroscopy    Syed Alexander MD      Chief Complaint      Uro lift evaluation      History of Present Illness     Pam Sun is a 78 y o  Male presents for Uro lift evaluation  He is a very pleasant 72-year-old male with a history of mixed lower urinary tract symptoms  Primary source of bother includes nocturia as well as daytime frequency up to every 90 minutes  He also reports a diminished and weakened urinary stream in the occasional need to strain  Patient has been offered medical therapy in the past but prefers to avoid any pharmacotherapy  He is interested in minimally invasive surgical correction  His brother-in-law underwent a successful Uro lift in the [place] area  Patient comes repair today with a number of well informed questions that I answered to the best of my ability  He does have a history of nephrolithiasis having previously undergone ureteroscopy  Of note no follow-up imaging has been performed since that procedure  He is asymptomatic however    Detailed Urologic History     - please refer to HPI    Review of Systems     Review of Systems   Constitutional: Negative for activity change and fatigue  HENT: Negative for congestion  Eyes: Negative for visual disturbance     Respiratory: Negative for shortness of breath and wheezing  Cardiovascular: Negative for chest pain and leg swelling  Gastrointestinal: Negative for abdominal pain  Endocrine: Negative for polyuria  Genitourinary: Negative for dysuria, flank pain, hematuria and urgency  As per HPI   Musculoskeletal: Negative for back pain  Allergic/Immunologic: Negative for immunocompromised state  Neurological: Negative for dizziness and numbness  Psychiatric/Behavioral: Negative for dysphoric mood  All other systems reviewed and are negative  AUA SYMPTOM SCORE      Most Recent Value   AUA SYMPTOM SCORE   How often have you had a sensation of not emptying your bladder completely after you finished urinating? 2   How often have you had to urinate again less than two hours after you finished urinating? 5   How often have you found you stopped and started again several times when you urinate? 5   How often have you found it difficult to postpone urination? 3   How often have you had a weak urinary stream?  5   How often have you had to push or strain to begin urination? 4   How many times did you most typically get up to urinate from the time you went to bed at night until the time you got up in the morning? 3   Quality of Life: If you were to spend the rest of your life with your urinary condition just the way it is now, how would you feel about that?  4   AUA SYMPTOM SCORE  27            Allergies     No Known Allergies    Physical Exam     Physical Exam  Constitutional:       General: He is not in acute distress  Appearance: He is well-developed  HENT:      Head: Normocephalic and atraumatic  Neck:      Musculoskeletal: Normal range of motion  Cardiovascular:      Comments: Negative lower extremity edema  Pulmonary:      Effort: Pulmonary effort is normal       Breath sounds: Normal breath sounds  Abdominal:      Palpations: Abdomen is soft     Genitourinary:     Penis: Normal        Prostate: Normal    Musculoskeletal: Normal range of motion  Skin:     General: Skin is warm  Neurological:      Mental Status: He is alert and oriented to person, place, and time     Psychiatric:         Behavior: Behavior normal              Vital Signs  Vitals:    06/10/21 0809   BP: 128/70   BP Location: Left arm   Patient Position: Sitting   Cuff Size: Adult   Pulse: 71   Weight: 93 2 kg (205 lb 6 4 oz)   Height: 6' (1 829 m)         Current Medications       Current Outpatient Medications:     Ascorbic Acid (VITAMIN C PO), Take by mouth, Disp: , Rfl:     Cholecalciferol (VITAMIN D3) 2000 units capsule, Take by mouth, Disp: , Rfl:     Coenzyme Q10 (CO Q 10) 100 MG CAPS, Take by mouth, Disp: , Rfl:     cyanocobalamin (VITAMIN B-12) 1,000 mcg tablet, Take 3 tablets by mouth daily, Disp: , Rfl:     LUTEIN PO, Take 2 tablets by mouth daily, Disp: , Rfl:     Multiple Vitamin (MULTIVITAMIN) tablet, Take 1 tablet by mouth daily, Disp: , Rfl:     Omega-3 Fatty Acids (FISH OIL) 1200 MG CPDR, Take by mouth, Disp: , Rfl:       Active Problems     Patient Active Problem List   Diagnosis    Benign prostatic hyperplasia    Prostate cancer screening    History of nephrolithiasis    Fatty liver    Low vitamin D level    Lung nodule    [place] cardiac risk 10-20% in next 10 years    Left ureteral calculus         Past Medical History     Past Medical History:   Diagnosis Date    Kidney stone     isaias         Surgical History     Past Surgical History:   Procedure Laterality Date    COLONOSCOPY      FL RETROGRADE PYELOGRAM  11/19/2020    LITHOTRIPSY      LYMPH NODE BIOPSY  1999    cervical, path showerd toxoplasmosis, no treatment    [place] CYSTO/URETERO W/LITHOTRIPSY &INDWELL STENT INSRT Left 11/19/2020    Procedure: CYSTOSCOPY URETEROSCOPY WITH LITHOTRIPSY HOLMIUM LASER, RIGHT AND LEFT RETROGRADE PYELOGRAM  AND INSERTION LEFT  STENT URETERAL;  Surgeon: Leona Mendez MD;  [facility];  Service: Urology    TONSILLECTOMY Family History     Family History   Problem Relation Age of Onset    Colon cancer Mother         dx late 57's    Migraines Mother     Cancer Mother 61        Liver or Colon cancer?  Lung cancer Father     Alcohol abuse Neg Hx     Substance Abuse Neg Hx     Mental illness Neg Hx     Depression Neg Hx          Social History     Social History     Social History     Tobacco Use   Smoking Status Never Smoker   Smokeless Tobacco Never Used         Pertinent Lab Values     Lab Results   Component Value Date    CREATININE 1 25 11/10/2020       Lab Results   Component Value Date    PSA 1 2 07/09/2020    PSA 1 2 07/05/2019    PSA 1 0 03/15/2018       @RESULTRCNT(1H])@      Pertinent Imaging      - n/a    Portions of the record may have been created with voice recognition software   Occasional wrong word or "sound a like" substitutions may have occurred due to the inherent limitations of voice recognition software   Read the chart carefully and recognize, using context, where substitutions have occurred

## 2021-06-11 ENCOUNTER — TELEPHONE (OUTPATIENT)
Dept: UROLOGY | Facility: CLINIC | Age: 71
End: 2021-06-11

## 2021-06-11 NOTE — TELEPHONE ENCOUNTER
I saw that patient returned Sabrina's call to schedule sx  I called patient to let him know that Hansa Flores is out of the office for the day, and she will call him back to schedule his surgery once she returns to the office on Monday  Patient understood and was appreciative of my call

## 2021-06-14 ENCOUNTER — PREP FOR PROCEDURE (OUTPATIENT)
Dept: UROLOGY | Facility: CLINIC | Age: 71
End: 2021-06-14

## 2021-06-14 DIAGNOSIS — N40.0 BPH WITHOUT OBSTRUCTION/LOWER URINARY TRACT SYMPTOMS: Primary | ICD-10-CM

## 2021-06-14 NOTE — TELEPHONE ENCOUNTER
Spoke w patient and he is sched for 7/9 at the [facility]  He is aware he needs a  and the hospital will contact him day prior w time of arrival  He will go for PATs this week and advised 7 day hold of aspirin products, multivitamins and fish oils  I am mailing him a surgical packet w all of this information and he is aware to contact us w any questions or concerns

## 2021-06-17 ENCOUNTER — HOSPITAL ENCOUNTER (OUTPATIENT)
Dept: ULTRASOUND IMAGING | Facility: HOSPITAL | Age: 71
Discharge: HOME | End: 2021-06-17
Attending: UROLOGY
Payer: COMMERCIAL

## 2021-06-17 ENCOUNTER — HOSPITAL ENCOUNTER (OUTPATIENT)
Dept: RADIOLOGY | Facility: HOSPITAL | Age: 71
Discharge: HOME | End: 2021-06-17
Attending: UROLOGY
Payer: COMMERCIAL

## 2021-06-17 DIAGNOSIS — N40.0 BPH WITHOUT OBSTRUCTION/LOWER URINARY TRACT SYMPTOMS: ICD-10-CM

## 2021-06-17 DIAGNOSIS — N20.1 LEFT URETERAL CALCULUS: ICD-10-CM

## 2021-06-17 PROCEDURE — 74018 RADEX ABDOMEN 1 VIEW: CPT

## 2021-06-17 PROCEDURE — 76770 US EXAM ABDO BACK WALL COMP: CPT

## 2021-07-01 ENCOUNTER — OFFICE VISIT (OUTPATIENT)
Dept: LAB | Facility: CLINIC | Age: 71
End: 2021-07-01
Payer: COMMERCIAL

## 2021-07-01 ENCOUNTER — OFFICE VISIT (OUTPATIENT)
Dept: INTERNAL MEDICINE CLINIC | Facility: CLINIC | Age: 71
End: 2021-07-01
Payer: COMMERCIAL

## 2021-07-01 ENCOUNTER — APPOINTMENT (OUTPATIENT)
Dept: LAB | Facility: CLINIC | Age: 71
End: 2021-07-01
Payer: COMMERCIAL

## 2021-07-01 VITALS
OXYGEN SATURATION: 97 % | WEIGHT: 205.6 LBS | SYSTOLIC BLOOD PRESSURE: 124 MMHG | HEART RATE: 68 BPM | RESPIRATION RATE: 15 BRPM | DIASTOLIC BLOOD PRESSURE: 78 MMHG | BODY MASS INDEX: 27.85 KG/M2 | TEMPERATURE: 98.1 F | HEIGHT: 72 IN

## 2021-07-01 DIAGNOSIS — R79.89 LOW VITAMIN D LEVEL: ICD-10-CM

## 2021-07-01 DIAGNOSIS — K76.0 FATTY LIVER: ICD-10-CM

## 2021-07-01 DIAGNOSIS — E78.00 ELEVATED LDL CHOLESTEROL LEVEL: Primary | ICD-10-CM

## 2021-07-01 DIAGNOSIS — Z00.00 MEDICARE ANNUAL WELLNESS VISIT, SUBSEQUENT: ICD-10-CM

## 2021-07-01 DIAGNOSIS — N40.0 BPH WITHOUT OBSTRUCTION/LOWER URINARY TRACT SYMPTOMS: ICD-10-CM

## 2021-07-01 DIAGNOSIS — E78.00 ELEVATED LDL CHOLESTEROL LEVEL: ICD-10-CM

## 2021-07-01 PROCEDURE — 93005 ELECTROCARDIOGRAM TRACING: CPT

## 2021-07-01 PROCEDURE — 3288F FALL RISK ASSESSMENT DOCD: CPT | Performed by: INTERNAL MEDICINE

## 2021-07-01 PROCEDURE — G0439 PPPS, SUBSEQ VISIT: HCPCS | Performed by: INTERNAL MEDICINE

## 2021-07-01 PROCEDURE — 1170F FXNL STATUS ASSESSED: CPT | Performed by: INTERNAL MEDICINE

## 2021-07-01 PROCEDURE — 99213 OFFICE O/P EST LOW 20 MIN: CPT | Performed by: INTERNAL MEDICINE

## 2021-07-01 PROCEDURE — 1160F RVW MEDS BY RX/DR IN RCRD: CPT | Performed by: INTERNAL MEDICINE

## 2021-07-01 PROCEDURE — 3008F BODY MASS INDEX DOCD: CPT | Performed by: INTERNAL MEDICINE

## 2021-07-01 PROCEDURE — 87086 URINE CULTURE/COLONY COUNT: CPT

## 2021-07-01 PROCEDURE — 1125F AMNT PAIN NOTED PAIN PRSNT: CPT | Performed by: INTERNAL MEDICINE

## 2021-07-01 PROCEDURE — 3725F SCREEN DEPRESSION PERFORMED: CPT | Performed by: INTERNAL MEDICINE

## 2021-07-01 PROCEDURE — 1036F TOBACCO NON-USER: CPT | Performed by: INTERNAL MEDICINE

## 2021-07-01 NOTE — PROGRESS NOTES
Assessment and Plan:     Problem List Items Addressed This Visit     Fatty liver    Relevant Orders    Comprehensive metabolic panel    Low vitamin D level    Relevant Orders    Vitamin D 25 hydroxy      Other Visit Diagnoses     Elevated LDL cholesterol level    -  Primary    improved on last BW in 2020, re-check again now    Relevant Orders    Lipid Panel with Direct LDL reflex    Medicare annual wellness visit, subsequent            BMI Counseling: Body mass index is 27 88 kg/m²  The BMI is above normal  Exercise recommendations include exercising 3-5 times per week  Preventive health issues were discussed with patient, and age appropriate screening tests were ordered as noted in patient's After Visit Summary  Personalized health advice and appropriate referrals for health education or preventive services given if needed, as noted in patient's After Visit Summary       History of Present Illness:     Patient presents for Medicare Annual Wellness visit    Patient Care Team:  Jose David Gerard DO as PCP - General  MD Cecelia Meza MD     Problem List:     Patient Active Problem List   Diagnosis    Benign prostatic hyperplasia    Prostate cancer screening    History of nephrolithiasis    Fatty liver    Low vitamin D level    Lung nodule    [place] cardiac risk 10-20% in next 10 years    Left ureteral calculus      Past Medical and Surgical History:     Past Medical History:   Diagnosis Date    BPH (benign prostatic hyperplasia)     Kidney stone     isaias     Past Surgical History:   Procedure Laterality Date    COLONOSCOPY      FL RETROGRADE PYELOGRAM  11/19/2020    LITHOTRIPSY      LYMPH NODE BIOPSY  1999    cervical, path showerd toxoplasmosis, no treatment    [place] CYSTO/URETERO W/LITHOTRIPSY &INDWELL STENT INSRT Left 11/19/2020    Procedure: CYSTOSCOPY URETEROSCOPY WITH LITHOTRIPSY HOLMIUM LASER, RIGHT AND LEFT RETROGRADE PYELOGRAM  AND INSERTION LEFT  STENT URETERAL; Surgeon: Debby Sahu MD;  [facility];  Service: Urology    TONSILLECTOMY        Family History:     Family History   Problem Relation Age of Onset    Colon cancer Mother         dx late 57's    Migraines Mother     Cancer Mother 61        Liver or Colon cancer?  Lung cancer Father     Alcohol abuse Neg Hx     Substance Abuse Neg Hx     Mental illness Neg Hx     Depression Neg Hx       Social History:     Social History     Socioeconomic History    Marital status: /Civil Union     Spouse name: None    Number of children: None    Years of education: None    Highest education level: None   Occupational History    Occupation:    Tobacco Use    Smoking status: Never Smoker    Smokeless tobacco: Never Used   Vaping Use    Vaping Use: Never used   Substance and Sexual Activity    Alcohol use: Yes     Comment: 2 x per week    Drug use: No    Sexual activity: None   Other Topics Concern    None   Social History Narrative    Exercises moderately less than 3 times per week      Social Determinants of Health     Financial Resource Strain:     Difficulty of Paying Living Expenses:    Food Insecurity:     Worried About Running Out of Food in the Last Year:     Ran Out of Food in the Last Year:    Transportation Needs:     Lack of Transportation (Medical):      Lack of Transportation (Non-Medical):    Physical Activity:     Days of Exercise per Week:     Minutes of Exercise per Session:    Stress:     Feeling of Stress :    Social Connections:     Frequency of Communication with Friends and Family:     Frequency of Social Gatherings with Friends and Family:     Attends Denominational Services:     Active Member of Clubs or Organizations:     Attends Club or Organization Meetings:     Marital Status:    Intimate Partner Violence:     Fear of Current or Ex-Partner:     Emotionally Abused:     Physically Abused:     Sexually Abused:       Medications and Allergies:     Current Outpatient Medications   Medication Sig Dispense Refill    Ascorbic Acid (VITAMIN C PO) Take by mouth daily       Cholecalciferol (VITAMIN D3) 2000 units capsule Take by mouth daily       Coenzyme Q10 (CO Q 10) 100 MG CAPS Take by mouth daily       cyanocobalamin (VITAMIN B-12) 1,000 mcg tablet Take 3 tablets by mouth daily      LUTEIN PO Take 2 tablets by mouth daily      Multiple Vitamin (MULTIVITAMIN) tablet Take 1 tablet by mouth daily      Omega-3 Fatty Acids (FISH OIL) 1200 MG CPDR Take by mouth daily        No current facility-administered medications for this visit  No Known Allergies   Immunizations:     Immunization History   Administered Date(s) Administered    SARS-CoV-2 / COVID-19 mRNA IM (Pfizer-BioNTech) 03/17/2021, 04/09/2021      Health Maintenance:         Topic Date Due    Colorectal Cancer Screening  12/20/2024    Hepatitis C Screening  Completed         Topic Date Due    DTaP,Tdap,and Td Vaccines (1 - Tdap) Never done    Pneumococcal Vaccine: 65+ Years (1 of 1 - PPSV23) Never done    Influenza Vaccine (1) 09/01/2021      Medicare Health Risk Assessment:     /78   Pulse 68   Temp 98 1 °F (36 7 °C)   Resp 15   Ht 6' (1 829 m)   Wt 93 3 kg (205 lb 9 6 oz)   SpO2 97%   BMI 27 88 kg/m²      Kristy Stark is here for his Subsequent Wellness visit  Health Risk Assessment:   Patient rates overall health as good  Patient feels that their physical health rating is same  Patient is satisfied with their life  Eyesight was rated as same  Hearing was rated as same  Patient feels that their emotional and mental health rating is same  Patients states they are never, rarely angry  Patient states they are sometimes unusually tired/fatigued  Pain experienced in the last 7 days has been some  Patient's pain rating has been 2/10  Patient states that he has experienced no weight loss or gain in last 6 months       Depression Screening:   PHQ-2 Score: 0      Fall Risk Screening: In the past year, patient has experienced: no history of falling in past year      Home Safety:  Patient does not have trouble with stairs inside or outside of their home  Patient has working smoke alarms and has working carbon monoxide detector  Home safety hazards include: none  Nutrition:   Current diet is Regular  Medications:   Patient is not currently taking any over-the-counter supplements  Patient is able to manage medications  Activities of Daily Living (ADLs)/Instrumental Activities of Daily Living (IADLs):   Walk and transfer into and out of bed and chair?: Yes  Dress and groom yourself?: Yes    Bathe or shower yourself?: Yes    Feed yourself? Yes  Do your laundry/housekeeping?: Yes  Manage your money, pay your bills and track your expenses?: Yes  Make your own meals?: Yes    Do your own shopping?: Yes    Previous Hospitalizations:   Any hospitalizations or ED visits within the last 12 months?: No      PREVENTIVE SCREENINGS      Cardiovascular Screening:    General: Screening Current    Due for: Lipid Panel      Diabetes Screening:     General: Screening Current    Due for: Blood Glucose      Colorectal Cancer Screening:     General: Screening Current      Prostate Cancer Screening:      Due for: PSA      Osteoporosis Screening:    General: Screening Not Indicated      Abdominal Aortic Aneurysm (AAA) Screening:    Risk factors include: age between 73-69 yo        General: Screening Not Indicated      Lung Cancer Screening:     General: Screening Not Indicated      Hepatitis C Screening:    General: Screening Current    Screening, Brief Intervention, and Referral to Treatment (SBIRT)    Screening  Typical number of drinks in a day: 0  Typical number of drinks in a week: 2  Interpretation: Low risk drinking behavior  Single Item Drug Screening:  How often have you used an illegal drug (including marijuana) or a prescription medication for non-medical reasons in the past year? never    Single Item Drug Screen Score: 0  Interpretation: Negative screen for possible drug use disorder      Tommy Hermosillo DO

## 2021-07-01 NOTE — PROGRESS NOTES
Assessment/Plan:     Diagnoses and all orders for this visit:    Elevated LDL cholesterol level  Comments:  improved on last BW in 2020, re-check again now  Orders:  -     Lipid Panel with Direct LDL reflex; Future    Fatty liver  Comments:  noted on previous imaging  LFTs WNL when last checked  re-check again now  Orders:  -     Comprehensive metabolic panel; Future    Low vitamin D level  Comments:  improved on re-check in 2019, c/w vit D OTC & re-check level again now  Orders:  -     Vitamin D 25 hydroxy    Medicare annual wellness visit, subsequent          Subjective:      Patient ID: Kael Abraham is a 78 y o  male  HPI     Here for follow up, Fanny Diaz is overall doing well  He takes only OTC meds on a regular basis  He exercises regularly including jogging/running a few times per week  He Is scheduled for a urologic procedure for his prostate later this month and reports he does not need a medical pre-op for this procedure  He worked on his cholesterol and it came down when checked in 2020  He continues to work part-time  He declines to wear a face mask during today's appointment  ROS Otherwise negative, no other complaints  Past Medical History:   Diagnosis Date    BPH (benign prostatic hyperplasia)     Kidney stone     isaias     Vitals:    07/01/21 0754   BP: 124/78   Pulse: 68   Resp: 15   Temp: 98 1 °F (36 7 °C)   SpO2: 97%   Weight: 93 3 kg (205 lb 9 6 oz)   Height: 6' (1 829 m)     Body mass index is 27 88 kg/m²      Current Outpatient Medications:     Ascorbic Acid (VITAMIN C PO), Take by mouth daily , Disp: , Rfl:     Cholecalciferol (VITAMIN D3) 2000 units capsule, Take by mouth daily , Disp: , Rfl:     Coenzyme Q10 (CO Q 10) 100 MG CAPS, Take by mouth daily , Disp: , Rfl:     cyanocobalamin (VITAMIN B-12) 1,000 mcg tablet, Take 3 tablets by mouth daily, Disp: , Rfl:     LUTEIN PO, Take 2 tablets by mouth daily, Disp: , Rfl:     Multiple Vitamin (MULTIVITAMIN) tablet, Take 1 tablet by mouth daily, Disp: , Rfl:     Omega-3 Fatty Acids (FISH OIL) 1200 MG CPDR, Take by mouth daily , Disp: , Rfl:   No Known Allergies      Review of Systems   Constitutional: Negative for fever  HENT: Negative for congestion  Eyes: Negative for visual disturbance  Respiratory: Negative for shortness of breath  Cardiovascular: Negative for chest pain  Gastrointestinal: Negative for abdominal pain  Endocrine: Negative for polyuria  Genitourinary: Negative for difficulty urinating  Musculoskeletal: Negative for arthralgias  Skin: Negative for rash  Allergic/Immunologic: Negative for immunocompromised state  Neurological: Negative for weakness  Psychiatric/Behavioral: Negative for dysphoric mood  Objective:      /78   Pulse 68   Temp 98 1 °F (36 7 °C)   Resp 15   Ht 6' (1 829 m)   Wt 93 3 kg (205 lb 9 6 oz)   SpO2 97%   BMI 27 88 kg/m²          Physical Exam  Vitals reviewed  Constitutional:       Appearance: Normal appearance  HENT:      Head: Normocephalic and atraumatic  Right Ear: Tympanic membrane normal       Left Ear: Tympanic membrane normal    Cardiovascular:      Rate and Rhythm: Normal rate and regular rhythm  Heart sounds: No murmur heard  Pulmonary:      Effort: Pulmonary effort is normal       Breath sounds: No wheezing or rales  Abdominal:      General: Bowel sounds are normal       Palpations: Abdomen is soft  Tenderness: There is no abdominal tenderness  Musculoskeletal:      Right lower leg: No edema  Left lower leg: No edema  Neurological:      Mental Status: He is alert  Mental status is at baseline     Psychiatric:         Mood and Affect: Mood normal          Behavior: Behavior normal

## 2021-07-01 NOTE — PATIENT INSTRUCTIONS
1  Fasting blood work in August 2  Consider pneumonia vaccine(pneumovax 23)    Pneumococcal Polyvalent Vaccine (By injection)   Pneumococcal Vaccine Polyvalent (XNS-pbs-JAE-al VAX-een uub-la-ELR-lent)  Prevents severe infections, such as pneumonia and meningitis  Brand Name(s): Pneumovax 23   There may be other brand names for this medicine  When This Medicine Should Not Be Used: This vaccine is not right for everyone  You should not receive this vaccine if you had an allergic reaction to pneumococcal vaccine  How to Use This Medicine:   Injectable  · A nurse or other health provider will give you this medicine  This vaccine is given as a shot into a muscle or under the skin, usually in the thigh or upper arm  Drugs and Foods to Avoid:   Ask your doctor or pharmacist before using any other medicine, including over-the-counter medicines, vitamins, and herbal products  · Some medicines can affect how this vaccine works  Tell your doctor if you are receiving a treatment or medicine that causes a weak immune system  This includes radiation treatment, steroid medicine (such as hydrocortisone, methylprednisolone, prednisolone, prednisone), or cancer medicine  · You should not receive varicella virus vaccine (Zostavax®) at the same time that you are given pneumococcal vaccine  The vaccines should be given 4 weeks apart  Warnings While Using This Medicine:   · Tell the doctor if you are currently sick, or if you have heart disease, blood vessel disease, or lung disease  · Adults and adolescents: Tell your doctor if you are pregnant or breastfeeding  · Tell your doctor if you have a weak immune system  You may not be fully protected by this vaccine  · Tell your doctor if you have any problems with spinal fluid, which could be caused by a birth defect or previous surgery  This vaccine may not prevent meningitis in people who have spinal fluid problems    Possible Side Effects While Using This Medicine:   Call your doctor right away if you notice any of these side effects:  · Allergic reaction: Itching or hives, swelling in your face or hands, swelling or tingling in your mouth or throat, chest tightness, trouble breathing  · High fever  If you notice these less serious side effects, talk with your doctor:   · Headache  · Muscle or joint pain  · Pain, redness, swelling, or tenderness where the shot was given  · Tiredness or weakness  If you notice other side effects that you think are caused by this medicine, tell your doctor  Call your doctor for medical advice about side effects  You may report side effects to FDA at 3-549-FDA-2478  © Copyright [employer] 2021 Information is for End User's use only and may not be sold, redistributed or otherwise used for commercial purposes  The above information is an  only  It is not intended as medical advice for individual conditions or treatments  Talk to your doctor, nurse or pharmacist before following any medical regimen to see if it is safe and effective for you  Medicare Preventive Visit Patient Instructions  Thank you for completing your Welcome to Medicare Visit or Medicare Annual Wellness Visit today  Your next wellness visit will be due in one year (7/2/2022)  The screening/preventive services that you may require over the next 5-10 years are detailed below  Some tests may not apply to you based off risk factors and/or age  Screening tests ordered at today's visit but not completed yet may show as past due  Also, please note that scanned in results may not display below    Preventive Screenings:  Service Recommendations Previous Testing/Comments   Colorectal Cancer Screening  · Colonoscopy    · Fecal Occult Blood Test (FOBT)/Fecal Immunochemical Test (FIT)  · Fecal DNA/Cologuard Test  · Flexible Sigmoidoscopy Age: 54-65 years old   Colonoscopy: every 10 years (May be performed more frequently if at higher risk)  OR  FOBT/FIT: every 1 year OR  Cologuard: every 3 years  OR  Sigmoidoscopy: every 5 years  Screening may be recommended earlier than age 48 if at higher risk for colorectal cancer  Also, an individualized decision between you and your healthcare provider will decide whether screening between the ages of 74-80 would be appropriate  Colonoscopy: 12/20/2019  FOBT/FIT: 07/09/2020  Cologuard: Not on file  Sigmoidoscopy: Not on file    Screening Current     Prostate Cancer Screening Individualized decision between patient and health care provider in men between ages of 53-78   Medicare will cover every 12 months beginning on the day after your 50th birthday PSA: 1 2 ng/mL     Screening Current     Hepatitis C Screening Once for adults born between 1945 and 1965  More frequently in patients at high risk for Hepatitis C Hep C Antibody: 07/05/2019    Screening Current   Diabetes Screening 1-2 times per year if you're at risk for diabetes or have pre-diabetes Fasting glucose: 95 mg/dL   A1C: 5 7 %    Screening Current   Cholesterol Screening Once every 5 years if you don't have a lipid disorder  May order more often based on risk factors  Lipid panel: 07/09/2020    Screening Current      Other Preventive Screenings Covered by Medicare:  1  Abdominal Aortic Aneurysm (AAA) Screening: covered once if your at risk  You're considered to be at risk if you have a family history of AAA or a male between the age of 73-68 who smoking at least 100 cigarettes in your lifetime  2  Lung Cancer Screening: covers low dose CT scan once per year if you meet all of the following conditions: (1) Age 50-69; (2) No signs or symptoms of lung cancer; (3) Current smoker or have quit smoking within the last 15 years; (4) You have a tobacco smoking history of at least 30 pack years (packs per day x number of years you smoked); (5) You get a written order from a healthcare provider    3  Glaucoma Screening: covered annually if you're considered high risk: (1) You have diabetes OR (2) Family history of glaucoma OR (3)  aged 48 and older OR (3)  American aged 72 and older  3  Osteoporosis Screening: covered every 2 years if you meet one of the following conditions: (1) Have a vertebral abnormality; (2) On glucocorticoid therapy for more than 3 months; (3) Have primary hyperparathyroidism; (4) On osteoporosis medications and need to assess response to drug therapy  5  HIV Screening: covered annually if you're between the age of 12-76  Also covered annually if you are younger than 13 and older than 72 with risk factors for HIV infection  For pregnant patients, it is covered up to 3 times per pregnancy  Immunizations:  Immunization Recommendations   Influenza Vaccine Annual influenza vaccination during flu season is recommended for all persons aged >= 6 months who do not have contraindications   Pneumococcal Vaccine (Prevnar and Pneumovax)  * Prevnar = PCV13  * Pneumovax = PPSV23 Adults 25-60 years old: 1-3 doses may be recommended based on certain risk factors  Adults 72 years old: Prevnar (PCV13) vaccine recommended followed by Pneumovax (PPSV23) vaccine  If already received PPSV23 since turning 65, then PCV13 recommended at least one year after PPSV23 dose  Hepatitis B Vaccine 3 dose series if at intermediate or high risk (ex: diabetes, end stage renal disease, liver disease)   Tetanus (Td) Vaccine - COST NOT COVERED BY MEDICARE PART B Following completion of primary series, a booster dose should be given every 10 years to maintain immunity against tetanus  Td may also be given as tetanus wound prophylaxis  Tdap Vaccine - COST NOT COVERED BY MEDICARE PART B Recommended at least once for all adults  For pregnant patients, recommended with each pregnancy     Shingles Vaccine (Shingrix) - COST NOT COVERED BY MEDICARE PART B  2 shot series recommended in those aged 48 and above     Health Maintenance Due:      Topic Date Due    Colorectal Cancer Screening 12/20/2024    Hepatitis C Screening  Completed     Immunizations Due:      Topic Date Due    DTaP,Tdap,and Td Vaccines (1 - Tdap) Never done    Pneumococcal Vaccine: 65+ Years (1 of 1 - PPSV23) Never done    Influenza Vaccine (1) 09/01/2021     Advance Directives   What are advance directives? Advance directives are legal documents that state your wishes and plans for medical care  These plans are made ahead of time in case you lose your ability to make decisions for yourself  Advance directives can apply to any medical decision, such as the treatments you want, and if you want to donate organs  What are the types of advance directives? There are many types of advance directives, and each state has rules about how to use them  You may choose a combination of any of the following:  · Living will: This is a written record of the treatment you want  You can also choose which treatments you do not want, which to limit, and which to stop at a certain time  This includes surgery, medicine, IV fluid, and tube feedings  · Durable power of  for healthcare [facility]): This is a written record that states who you want to make healthcare choices for you when you are unable to make them for yourself  This person, called a proxy, is usually a family member or a friend  You may choose more than 1 proxy  · Do not resuscitate (DNR) order:  A DNR order is used in case your heart stops beating or you stop breathing  It is a request not to have certain forms of treatment, such as CPR  A DNR order may be included in other types of advance directives  · Medical directive: This covers the care that you want if you are in a coma, near death, or unable to make decisions for yourself  You can list the treatments you want for each condition  Treatment may include pain medicine, surgery, blood transfusions, dialysis, IV or tube feedings, and a ventilator (breathing machine)  · Values history:   This document has questions about your views, beliefs, and how you feel and think about life  This information can help others choose the care that you would choose  Why are advance directives important? An advance directive helps you control your care  Although spoken wishes may be used, it is better to have your wishes written down  Spoken wishes can be misunderstood, or not followed  Treatments may be given even if you do not want them  An advance directive may make it easier for your family to make difficult choices about your care  Weight Management   Why it is important to manage your weight:  Being overweight increases your risk of health conditions such as heart disease, high blood pressure, type 2 diabetes, and certain types of cancer  It can also increase your risk for osteoarthritis, sleep apnea, and other respiratory problems  Aim for a slow, steady weight loss  Even a small amount of weight loss can lower your risk of health problems  How to lose weight safely:  A safe and healthy way to lose weight is to eat fewer calories and get regular exercise  You can lose up about 1 pound a week by decreasing the number of calories you eat by 500 calories each day  Healthy meal plan for weight management:  A healthy meal plan includes a variety of foods, contains fewer calories, and helps you stay healthy  A healthy meal plan includes the following:  · Eat whole-grain foods more often  A healthy meal plan should contain fiber  Fiber is the part of grains, fruits, and vegetables that is not broken down by your body  Whole-grain foods are healthy and provide extra fiber in your diet  Some examples of whole-grain foods are whole-wheat breads and pastas, oatmeal, brown rice, and bulgur  · Eat a variety of vegetables every day  Include dark, leafy greens such as spinach, kale, jonathan greens, and mustard greens  Eat yellow and orange vegetables such as carrots, sweet potatoes, and winter squash  · Eat a variety of fruits every day  Choose fresh or canned fruit (canned in its own juice or light syrup) instead of juice  Fruit juice has very little or no fiber  · Eat low-fat dairy foods  Drink fat-free (skim) milk or 1% milk  Eat fat-free yogurt and low-fat cottage cheese  Try low-fat cheeses such as mozzarella and other reduced-fat cheeses  · Choose meat and other protein foods that are low in fat  Choose beans or other legumes such as split peas or lentils  Choose fish, skinless poultry (chicken or turkey), or lean cuts of red meat (beef or pork)  Before you cook meat or poultry, cut off any visible fat  · Use less fat and oil  Try baking foods instead of frying them  Add less fat, such as margarine, sour cream, regular salad dressing and mayonnaise to foods  Eat fewer high-fat foods  Some examples of high-fat foods include french fries, doughnuts, ice cream, and cakes  · Eat fewer sweets  Limit foods and drinks that are high in sugar  This includes candy, cookies, regular soda, and sweetened drinks  Exercise:  Exercise at least 30 minutes per day on most days of the week  Some examples of exercise include walking, biking, dancing, and swimming  You can also fit in more physical activity by taking the stairs instead of the elevator or parking farther away from stores  Ask your healthcare provider about the best exercise plan for you  © Copyright [employer] 2018 Information is for End User's use only and may not be sold, redistributed or otherwise used for commercial purposes   All illustrations and images included in CareNotes® are the copyrighted property of [employer]  or [address] [place] [address]

## 2021-07-01 NOTE — PRE-PROCEDURE INSTRUCTIONS
Pre-Surgery Instructions:   Medication Instructions    Ascorbic Acid (VITAMIN C PO) Instructed patient per Anesthesia Guidelines   Cholecalciferol (VITAMIN D3) 2000 units capsule Instructed patient per Anesthesia Guidelines   Coenzyme Q10 (CO Q 10) 100 MG CAPS Instructed patient per Anesthesia Guidelines   cyanocobalamin (VITAMIN B-12) 1,000 mcg tablet Instructed patient per Anesthesia Guidelines   LUTEIN PO Instructed patient per Anesthesia Guidelines   Multiple Vitamin (MULTIVITAMIN) tablet Instructed patient per Anesthesia Guidelines   Omega-3 Fatty Acids (FISH OIL) 1200 MG CPDR Instructed patient per Anesthesia Guidelines  Med list reviewed as above  Per anesthesia guidelines, pt will hold vitamins x 1 week prior to sx  Also instructed pt not to start any new vitamins/supplements preoperatively and to avoid NSAID's  3 days prior to surgery  Tylenol is acceptable if needed  Pt will shower with regular soap in the am prior to surgery  Reviewed [facility] current covid visitor  policy and pt understands that it may change at any time  All information within "My Surgical Experience" pamphlet reviewed and patient verbalizes understanding and compliance  All questions answered

## 2021-07-02 ENCOUNTER — ANESTHESIA EVENT (OUTPATIENT)
Dept: PERIOP | Facility: AMBULARY SURGERY CENTER | Age: 71
End: 2021-07-02
Payer: COMMERCIAL

## 2021-07-02 LAB
ATRIAL RATE: 55 BPM
BACTERIA UR CULT: NORMAL
P AXIS: 64 DEGREES
PR INTERVAL: 200 MS
QRS AXIS: 24 DEGREES
QRSD INTERVAL: 88 MS
QT INTERVAL: 462 MS
QTC INTERVAL: 441 MS
T WAVE AXIS: 63 DEGREES
VENTRICULAR RATE: 55 BPM

## 2021-07-02 PROCEDURE — 93010 ELECTROCARDIOGRAM REPORT: CPT | Performed by: INTERNAL MEDICINE

## 2021-07-09 ENCOUNTER — ANESTHESIA (OUTPATIENT)
Dept: PERIOP | Facility: AMBULARY SURGERY CENTER | Age: 71
End: 2021-07-09
Payer: COMMERCIAL

## 2021-07-09 ENCOUNTER — HOSPITAL ENCOUNTER (OUTPATIENT)
Facility: AMBULARY SURGERY CENTER | Age: 71
Setting detail: OUTPATIENT SURGERY
Discharge: HOME | End: 2021-07-09
Attending: UROLOGY | Admitting: UROLOGY
Payer: COMMERCIAL

## 2021-07-09 ENCOUNTER — TELEPHONE (OUTPATIENT)
Dept: UROLOGY | Facility: MEDICAL CENTER | Age: 71
End: 2021-07-09

## 2021-07-09 VITALS
SYSTOLIC BLOOD PRESSURE: 136 MMHG | RESPIRATION RATE: 16 BRPM | OXYGEN SATURATION: 97 % | TEMPERATURE: 96.8 F | HEIGHT: 72 IN | WEIGHT: 205 LBS | HEART RATE: 61 BPM | BODY MASS INDEX: 27.77 KG/M2 | DIASTOLIC BLOOD PRESSURE: 80 MMHG

## 2021-07-09 DIAGNOSIS — N40.1 BENIGN PROSTATIC HYPERPLASIA WITH WEAK URINARY STREAM: Primary | ICD-10-CM

## 2021-07-09 DIAGNOSIS — R39.12 BENIGN PROSTATIC HYPERPLASIA WITH WEAK URINARY STREAM: Primary | ICD-10-CM

## 2021-07-09 PROCEDURE — L8699 PROSTHETIC IMPLANT NOS: HCPCS | Performed by: UROLOGY

## 2021-07-09 PROCEDURE — 52442 CYSTO INS TRNSPRSTC IMPLT EA: CPT | Performed by: UROLOGY

## 2021-07-09 PROCEDURE — 51798 US URINE CAPACITY MEASURE: CPT | Performed by: UROLOGY

## 2021-07-09 PROCEDURE — 52441 CYSTO INSJ TRNSPRSTC 1 IMPLT: CPT | Performed by: UROLOGY

## 2021-07-09 DEVICE — IMPLANT URO PROSTATE UROLIFT: Type: IMPLANTABLE DEVICE | Site: URETHRA | Status: FUNCTIONAL

## 2021-07-09 RX ORDER — ACETAMINOPHEN 325 MG/1
650 TABLET ORAL EVERY 4 HOURS PRN
Qty: 30 TABLET | Refills: 0
Start: 2021-07-09 | End: 2021-08-13 | Stop reason: HOSPADM

## 2021-07-09 RX ORDER — PROPOFOL 10 MG/ML
INJECTION, EMULSION INTRAVENOUS AS NEEDED
Status: DISCONTINUED | OUTPATIENT
Start: 2021-07-09 | End: 2021-07-09

## 2021-07-09 RX ORDER — PROPOFOL 10 MG/ML
INJECTION, EMULSION INTRAVENOUS CONTINUOUS PRN
Status: DISCONTINUED | OUTPATIENT
Start: 2021-07-09 | End: 2021-07-09

## 2021-07-09 RX ORDER — SODIUM CHLORIDE, SODIUM LACTATE, POTASSIUM CHLORIDE, CALCIUM CHLORIDE 600; 310; 30; 20 MG/100ML; MG/100ML; MG/100ML; MG/100ML
INJECTION, SOLUTION INTRAVENOUS CONTINUOUS PRN
Status: DISCONTINUED | OUTPATIENT
Start: 2021-07-09 | End: 2021-07-09

## 2021-07-09 RX ORDER — CEFAZOLIN SODIUM 2 G/50ML
2000 SOLUTION INTRAVENOUS ONCE
Status: COMPLETED | OUTPATIENT
Start: 2021-07-09 | End: 2021-07-09

## 2021-07-09 RX ORDER — ONDANSETRON 2 MG/ML
4 INJECTION INTRAMUSCULAR; INTRAVENOUS ONCE AS NEEDED
Status: DISCONTINUED | OUTPATIENT
Start: 2021-07-09 | End: 2021-07-09 | Stop reason: HOSPADM

## 2021-07-09 RX ORDER — MAGNESIUM HYDROXIDE 1200 MG/15ML
LIQUID ORAL AS NEEDED
Status: DISCONTINUED | OUTPATIENT
Start: 2021-07-09 | End: 2021-07-09 | Stop reason: HOSPADM

## 2021-07-09 RX ORDER — FUROSEMIDE 10 MG/ML
INJECTION INTRAMUSCULAR; INTRAVENOUS AS NEEDED
Status: DISCONTINUED | OUTPATIENT
Start: 2021-07-09 | End: 2021-07-09

## 2021-07-09 RX ORDER — LIDOCAINE HYDROCHLORIDE 10 MG/ML
INJECTION, SOLUTION EPIDURAL; INFILTRATION; INTRACAUDAL; PERINEURAL AS NEEDED
Status: DISCONTINUED | OUTPATIENT
Start: 2021-07-09 | End: 2021-07-09

## 2021-07-09 RX ORDER — FENTANYL CITRATE 50 UG/ML
INJECTION, SOLUTION INTRAMUSCULAR; INTRAVENOUS AS NEEDED
Status: DISCONTINUED | OUTPATIENT
Start: 2021-07-09 | End: 2021-07-09

## 2021-07-09 RX ORDER — PHENAZOPYRIDINE HYDROCHLORIDE 200 MG/1
200 TABLET, FILM COATED ORAL 3 TIMES DAILY PRN
Qty: 10 TABLET | Refills: 0 | Status: SHIPPED | OUTPATIENT
Start: 2021-07-09 | End: 2021-07-12

## 2021-07-09 RX ORDER — DOCUSATE SODIUM 100 MG/1
100 CAPSULE, LIQUID FILLED ORAL 2 TIMES DAILY
Qty: 30 CAPSULE | Refills: 0 | Status: SHIPPED | OUTPATIENT
Start: 2021-07-09 | End: 2021-07-24

## 2021-07-09 RX ORDER — NAPROXEN 500 MG/1
500 TABLET ORAL 2 TIMES DAILY WITH MEALS
Qty: 10 TABLET | Refills: 0 | Status: SHIPPED | OUTPATIENT
Start: 2021-07-09 | End: 2021-08-13 | Stop reason: HOSPADM

## 2021-07-09 RX ORDER — MIDAZOLAM HYDROCHLORIDE 2 MG/2ML
INJECTION, SOLUTION INTRAMUSCULAR; INTRAVENOUS AS NEEDED
Status: DISCONTINUED | OUTPATIENT
Start: 2021-07-09 | End: 2021-07-09

## 2021-07-09 RX ORDER — FENTANYL CITRATE/PF 50 MCG/ML
25 SYRINGE (ML) INJECTION
Status: DISCONTINUED | OUTPATIENT
Start: 2021-07-09 | End: 2021-07-09 | Stop reason: HOSPADM

## 2021-07-09 RX ADMIN — SODIUM CHLORIDE, SODIUM LACTATE, POTASSIUM CHLORIDE, AND CALCIUM CHLORIDE: .6; .31; .03; .02 INJECTION, SOLUTION INTRAVENOUS at 09:08

## 2021-07-09 RX ADMIN — PROPOFOL 20 MG: 10 INJECTION, EMULSION INTRAVENOUS at 09:18

## 2021-07-09 RX ADMIN — FUROSEMIDE 20 MG: 10 INJECTION, SOLUTION INTRAMUSCULAR; INTRAVENOUS at 09:31

## 2021-07-09 RX ADMIN — LIDOCAINE HYDROCHLORIDE 50 MG: 10 INJECTION, SOLUTION EPIDURAL; INFILTRATION; INTRACAUDAL; PERINEURAL at 09:18

## 2021-07-09 RX ADMIN — CEFAZOLIN SODIUM 2000 MG: 2 SOLUTION INTRAVENOUS at 09:10

## 2021-07-09 RX ADMIN — MIDAZOLAM 2 MG: 1 INJECTION INTRAMUSCULAR; INTRAVENOUS at 09:11

## 2021-07-09 RX ADMIN — FENTANYL CITRATE 50 MCG: 50 INJECTION INTRAMUSCULAR; INTRAVENOUS at 09:11

## 2021-07-09 RX ADMIN — PROPOFOL 80 MCG/KG/MIN: 10 INJECTION, EMULSION INTRAVENOUS at 09:18

## 2021-07-09 RX ADMIN — FENTANYL CITRATE 25 MCG: 50 INJECTION INTRAMUSCULAR; INTRAVENOUS at 09:28

## 2021-07-09 RX ADMIN — PROPOFOL 20 MG: 10 INJECTION, EMULSION INTRAVENOUS at 09:27

## 2021-07-09 RX ADMIN — FENTANYL CITRATE 25 MCG: 50 INJECTION INTRAMUSCULAR; INTRAVENOUS at 09:24

## 2021-07-09 NOTE — OP NOTE
Operative Note     PATIENT:  Agatha Whalen (MRN [id number])    DATE OF PROCEDURE:   7/9/2021    PRE-OP DIAGNOSES:   1) BPH with obstruction     POST-OP DIAGNOSES AND OPERATIVE FINDINGS:   1) BPH with obstruction    PROCEDURES:  1) UroLift implantation    SURGEON:   Shiloh Haney MD    No qualified teaching residents available to assist    ANESTHESIA TYPE:  IV Sedation    ESTIMATED BLOOD LOSS:   Minimal    COMPLICATIONS:   None    ANTIBIOTICS:  Cefazolin     INTRAOPERATIVE THROMBOEMBOLISM PROPHYLAXIS:  Pneumatic compression stockings    PROCEDURE SUMMARY:    The patient was identified, brought to the operating room, and placed on the table in supine position  After induction of general anesthesia, the patient was placed in dorsal lithotomy position and prepped and draped in the usual sterile fashion  A complete formal timeout was performed  A 20F cystoscope was inserted into the bladder  The cystoscopy bridge was replaced with a UroLift delivery device  The first treatment site was the patient's left side approximately 1 5 cm distal to the bladder neck  The distal tip of the delivery device was then angled laterally approximately 20 degrees at this position to compress the lateral lobe  The trigger was pulled, thereby deploying a needle containing the implant through the prostate  The needle was then retracted, allowing one end of the implant to be delivered to the capsular surface of the prostate  The implant was then tensioned to assure capsular seating and removal of slack monofilament  The device was then angled back toward midline and slowly advanced proximally until cystoscopic verification of the monofilament being centered in the delivery bay  The urethral end piece was then affixed to the monofilament thereby tailoring the size of the implant  Excess filament was then severed  The delivery device was then re-advanced into the bladder   The delivery device was then replaced with cystoscope and bridge and the implant location and opening effect was confirmed cystoscopically  The same procedure was then repeated on the right side, and two additional implants were delivered just proximal to the veru montanum, again one on left and one on right side of the prostate, following the same technique  A total of 4 implants were deployed to create a nice anterior channel  Implants were deployed in the following locations:    1  Right bladder neck  2  Left bladder neck  3  Right mid gland/apex  4  Left mid gland/apex    A final cystoscopy was conducted first to inspect the location and state of each implant and second, to confirm the presence of a continuous anterior channel was present through the prostatic urethra with irrigation flow turned off  A graham catheter was then placed  The patient tolerated the procedure well and was transferred to the recovery room awake alert and in stable condition  IMPLANTS:   Implant Name Type Inv   Item Serial No   Lot No  LRB No  Used Action   IMPLANT URO PROSTATE UROLIFT - [id number]  IMPLANT URO PROSTATE UROLIFT  [employer] [id number] N/A 4 Implanted        PLAN:  Patient will undergo a voiding trial prior to discharge

## 2021-07-09 NOTE — INTERVAL H&P NOTE
H&P reviewed  After examining the patient I find no changes in the patients condition since the H&P had been written      Vitals:    07/09/21 0835   BP: 126/81   Pulse:    Resp:    Temp:    SpO2:

## 2021-07-09 NOTE — ANESTHESIA POSTPROCEDURE EVALUATION
Post-Op Assessment Note    CV Status:  Stable    Pain management: adequate     Mental Status:  Sleepy and arousable   Hydration Status:  Euvolemic   PONV Controlled:  Controlled   Airway Patency:  Patent      Post Op Vitals Reviewed: Yes      Staff: CRNA         No complications documented      BP (P) 112/75 (07/09/21 0936)    Temp 98 6 °F (37 °C) (07/09/21 0936)    Pulse 68 (07/09/21 0936)   Resp (P) 12 (07/09/21 0936)    SpO2   96

## 2021-07-09 NOTE — DISCHARGE INSTRUCTIONS
Mr Estela Torres:    Your surgery went very well  I was able to open a nice, wide, more natural channel within your prostate by placing 4 Uro lift implants  Please take your medications as needed for discomfort over the next few days  Most importantly please drink 6-8 glasses water per day  Please call with any questions or concerns  Soledad Goodell MD,PhD  [facility]  [phone]          UROLIFT      WHAT YOU NEED TO KNOW:   A UroLift is procedure that is done to open the natural channel within your prostate gland  DISCHARGE INSTRUCTIONS:   Medicines:   · Pain medicine: You may be given a prescription medicine to decrease pain  Do not wait until the pain is severe before you take these medicines:  · Tylenol (over the counter) - please take this as directed on the bottle for routine pain  · Naproxen (prescription-strength NSAID/Ibuprofen type medicine) - for moderate pain  This can be substituted with over the counter Ibuprofen if more convenient, or it this is less expensive  · Pyridium - to minimize pain and discomfort when passing your urine  Will turn your urine orange  This can be substituted with over the counter "AZO" if more convenient, or it this is less expensive  · Colace - to prevent constipation  This is also an over the counter medicine - you can purchase it without a prescription, if more convenient or less expensive  · If your pain is not well controlled using Tylenol, Naprosyn/ibuprofen please do not hesitate to call our office, you will be connected to our care team day or night and you can be issued a prescription for a narcotic pain medication    · Antibiotics:  You may be provided with antibiotics at discharge, particularly if you are being sent home with a graham catheter     This medicine is given to fight or prevent an infection caused by bacteria  Always take your antibiotics exactly as ordered by your healthcare provider   Do not stop taking your medicine unless directed by your healthcare provider  Never save antibiotics or take leftover antibiotics that were given to you for another illness  · Take your medicine as directed  Contact your healthcare provider if you think your medicine is not helping or if you have side effects  Tell him or her if you are allergic to any medicine  Keep a list of the medicines, vitamins, and herbs you take  Include the amounts, and when and why you take them  Bring the list or the pill bottles to follow-up visits  Carry your medicine list with you in case of an emergency  Follow up with your healthcare provider or urologist as directed: You may need to return to make sure you do not have an infection, or to have your Leo catheter removed  Write down your questions so you remember to ask them during your visits  Leo catheter care: You may be sent home with a Leo catheter  If so you will be provided with instructions to remove it    · Please drink plenty of fluids  Blood in your urine is normal for few days following the procedure  Keeping well-hydrated will prevent any trouble from this    Contact your healthcare provider or urologist if:   · You have a fever  · You have new or more blood in your urine  · You have trouble starting to urinate, or have a weak stream of urine when you urinate  · You feel like you have a full bladder, even after you urinate  You may also leak urine  · You often wake up during the night to urinate  You may also feel the need to urinate right away  · You feel pain and burning when you urinate  · You feel pain or pressure in your lower abdomen  · Your urine looks cloudy, and smells bad  · You have trouble getting an erection or ejaculating  · You have questions or concerns about your condition or care  Seek care immediately or call 911 if:   · You urinate little or not at all  · You have severe abdominal or back pain      · You are dizzy or confused  · You have abdominal pain, nausea, and vomiting  · Your heartbeat is slower than usual   © 2017 [address] Information is for End User's use only and may not be sold, redistributed or otherwise used for commercial purposes  All illustrations and images included in CareNotes® are the copyrighted property of A [employer] A M [employer]  or [employer]  The above information is an  only  It is not intended as medical advice for individual conditions or treatments  Talk to your doctor, nurse or pharmacist before following any medical regimen to see if it is safe and effective for you

## 2021-07-09 NOTE — TELEPHONE ENCOUNTER
Patient arrived at [facility] office at 245pm  He was then able to void in the waiting room bathroom  PVR performed and found to be 0ml  Patient nervous this may happen again over the weekend  Patient's wife is a newly retired labor and delivery nurse  [facility] supplies and instructions reviewed  She feels comfortable cathing patient if necessary over the weekend  Reviewed this should not be needed, but now they have the supplies and the means just in case  Patient and wife know I will call them Monday to assess how things went over the weekend

## 2021-07-09 NOTE — ANESTHESIA PREPROCEDURE EVALUATION
Procedure:  CYSTOSCOPY WITH INSERTION UROLIFT (N/A Urethra)    Relevant Problems   GI/HEPATIC   (+) Fatty liver      /RENAL   (+) Benign prostatic hyperplasia      NEURO/PSYCH   (+) History of nephrolithiasis        Physical Exam    Airway    Mallampati score: II  TM Distance: >3 FB  Neck ROM: full     Dental   No notable dental hx     Cardiovascular  Cardiovascular exam normal    Pulmonary  Pulmonary exam normal     Other Findings        Anesthesia Plan  ASA Score- 2     Anesthesia Type- IV sedation with anesthesia with ASA Monitors  Additional Monitors:   Airway Plan:           Plan Factors-Exercise tolerance (METS): >4 METS  Chart reviewed  Imaging results reviewed  Existing labs reviewed  Patient summary reviewed  Patient is not a current smoker  Induction-     Postoperative Plan-     Informed Consent- Anesthetic plan and risks discussed with patient  I personally reviewed this patient with the CRNA  Discussed and agreed on the Anesthesia Plan with the CRNA  Krystin Mascorro

## 2021-07-09 NOTE — TELEPHONE ENCOUNTER
Patient seen in [facility] by Dr Brigdet Mercado s/p 7/9/21 today Urolift  Patient came home from procedure today  Leo was removed  Patient voided before discharge 11:25am  Patient is now at home and is not able to void  Pressure and pelvic pain  Sent tiger text to Provider  Awaiting respone  [facility]  [facility]  Patient was notified   Verbalized understanding   They are going to [facility]

## 2021-07-12 NOTE — TELEPHONE ENCOUNTER
Called and left message for return call, number provided  When patient or wife return call please inquire if he has been able to urinate well over the weekend  If he is doing well plan will be to follow up with [facility] in 4-6 weeks for post op appt  Please assist with scheduling this

## 2021-07-14 NOTE — TELEPHONE ENCOUNTER
Called and spoke with patient at this time  He is doing well after surgery and no urinary issues over the weekend or this week  Reviewed he did not have a post op appt scheduled  Appt scheduled for 8/6/21 at 815am  He knows to arrive with a full bladder

## 2021-08-13 ENCOUNTER — OFFICE VISIT (OUTPATIENT)
Dept: UROLOGY | Facility: CLINIC | Age: 71
End: 2021-08-13
Payer: COMMERCIAL

## 2021-08-13 VITALS
WEIGHT: 201 LBS | BODY MASS INDEX: 27.22 KG/M2 | HEIGHT: 72 IN | SYSTOLIC BLOOD PRESSURE: 114 MMHG | DIASTOLIC BLOOD PRESSURE: 62 MMHG | HEART RATE: 61 BPM

## 2021-08-13 DIAGNOSIS — N40.1 BENIGN PROSTATIC HYPERPLASIA WITH LOWER URINARY TRACT SYMPTOMS, SYMPTOM DETAILS UNSPECIFIED: ICD-10-CM

## 2021-08-13 DIAGNOSIS — N40.0 BPH WITHOUT OBSTRUCTION/LOWER URINARY TRACT SYMPTOMS: Primary | ICD-10-CM

## 2021-08-13 LAB — POST-VOID RESIDUAL VOLUME, ML POC: 0 ML

## 2021-08-13 PROCEDURE — 3008F BODY MASS INDEX DOCD: CPT | Performed by: INTERNAL MEDICINE

## 2021-08-13 PROCEDURE — 99024 POSTOP FOLLOW-UP VISIT: CPT | Performed by: PHYSICIAN ASSISTANT

## 2021-08-13 PROCEDURE — 51798 US URINE CAPACITY MEASURE: CPT | Performed by: PHYSICIAN ASSISTANT

## 2021-08-13 NOTE — PROGRESS NOTES
Assessment/Plan:    Benign prostatic hyperplasia    Patient is status post UroLift on 07/09/2021 with Dr Kev Marques  Patient is currently reporting that he is experiencing some improvement with his urinary symptoms but continues to experience frequency and urgency  Discussed with patient the difference between storage symptoms and obstructive symptoms  Patient currently reporting that he had  A combination of both storage symptoms and obstructive symptoms prior to UroLift  Discussed that his urinary symptoms may be in combination of BPH and overactive bladder syndrome  Discussed with patient that we will continue to monitor his urinary symptoms at this time  As he is still recently post op and may have some inflammation and irritative symptoms from his procedure  Will follow-up in 3 months with a uroflow as uroflow was in adequate due to low volume this appointment  PVR 0  Discussed with patient if his urinary symptoms of frequency,urgency, and nocturia become extremely bothersome, he may call prior to his appointment and we will prescribe patient a low dose of medication  Discussed anticholinergic medications such as oxybutynin and VESIcare  Discussed side effect profile  Patient is currently agreeable to plan  Will follow-up in 3 months for symptom reassessment with PVR, uroflow and AUA symptom score  Will also resume prostate cancer screening at that time as patient has not had PSA in 1 year will not obtain at this time given recent surgical intervention  Problem List Items Addressed This Visit        Genitourinary    Benign prostatic hyperplasia       Patient is status post UroLift on 07/09/2021 with Dr Kev Marques  Patient is currently reporting that he is experiencing some improvement with his urinary symptoms but continues to experience frequency and urgency  Discussed with patient the difference between storage symptoms and obstructive symptoms    Patient currently reporting that he had  A combination of both storage symptoms and obstructive symptoms prior to UroLift  Discussed that his urinary symptoms may be in combination of BPH and overactive bladder syndrome  Discussed with patient that we will continue to monitor his urinary symptoms at this time  As he is still recently post op and may have some inflammation and irritative symptoms from his procedure  Will follow-up in 3 months with a uroflow as uroflow was in adequate due to low volume this appointment  PVR 0  Discussed with patient if his urinary symptoms of frequency,urgency, and nocturia become extremely bothersome, he may call prior to his appointment and we will prescribe patient a low dose of medication  Discussed anticholinergic medications such as oxybutynin and VESIcare  Discussed side effect profile  Patient is currently agreeable to plan  Will follow-up in 3 months for symptom reassessment with PVR, uroflow and AUA symptom score  Will also resume prostate cancer screening at that time as patient has not had PSA in 1 year will not obtain at this time given recent surgical intervention  Other Visit Diagnoses     BPH without obstruction/lower urinary tract symptoms    -  Primary    Relevant Orders    POCT Measure PVR (Completed)            Subjective:      Patient ID: Sol Montgomery is a 78 y o  male  HPI   Patient is a 21-year-old male with history of BPH with lower urinary tract symptoms  Status post UroLift on 07/09/2021  He is presenting for postoperative visit  Patient currently reporting that he is doing well since his procedure  He reports that he has a good urinary stream denies any straining with urination  He does report that he has continued frequency and urgency  He reports this is more so bothersome throughout the night and not so much during the day  He denies any additional complaints at this time        AUA SYMPTOM SCORE      Most Recent Value   AUA SYMPTOM SCORE   How often have you had a sensation of not emptying your bladder completely after you finished urinating? 0   How often have you had to urinate again less than two hours after you finished urinating? 5   How often have you found you stopped and started again several times when you urinate? 3   How often have you found it difficult to postpone urination? 2   How often have you had a weak urinary stream?  3   How often have you had to push or strain to begin urination? 2   How many times did you most typically get up to urinate from the time you went to bed at night until the time you got up in the morning? 5   Quality of Life: If you were to spend the rest of your life with your urinary condition just the way it is now, how would you feel about that?  4   AUA SYMPTOM SCORE  20            The following portions of the patient's history were reviewed and updated as appropriate:   He  has a past medical history of BPH (benign prostatic hyperplasia) and Kidney stone  He   Patient Active Problem List    Diagnosis Date Noted    Left ureteral calculus 11/19/2020    [place] cardiac risk 10-20% in next 10 years 07/02/2020    Prostate cancer screening 03/20/2018    History of nephrolithiasis 03/20/2018    Fatty liver 07/31/2017    Low vitamin D level 07/15/2016    Lung nodule 06/05/2015    Benign prostatic hyperplasia 02/21/2014     He  has a past surgical history that includes Tonsillectomy; Lymph node biopsy (1999); LITHOTRIPSY; Colonoscopy; FL retrograde pyelogram (11/19/2020); pr cysto/uretero w/lithotripsy &indwell stent insrt (Left, 11/19/2020); and pr cystourethro w/implant (N/A, 7/9/2021)  His family history includes Cancer (age of onset: 61) in his mother; Colon cancer in his mother; Lung cancer in his father; Migraines in his mother  He  reports that he has never smoked  He has never used smokeless tobacco  He reports current alcohol use   He reports that he does not use drugs   Current Outpatient Medications   Medication Sig Dispense Refill    Ascorbic Acid (VITAMIN C PO) Take by mouth daily       Cholecalciferol (VITAMIN D3) 2000 units capsule Take by mouth daily       Coenzyme Q10 (CO Q 10) 100 MG CAPS Take by mouth daily       cyanocobalamin (VITAMIN B-12) 1,000 mcg tablet Take 3 tablets by mouth daily      LUTEIN PO Take 2 tablets by mouth daily      Multiple Vitamin (MULTIVITAMIN) tablet Take 1 tablet by mouth daily      Omega-3 Fatty Acids (FISH OIL) 1200 MG CPDR Take by mouth daily       docusate sodium (COLACE) 100 mg capsule Take 1 capsule (100 mg total) by mouth 2 (two) times a day for 15 days 30 capsule 0     No current facility-administered medications for this visit  He has No Known Allergies       Review of Systems   Constitutional: Negative  Negative for chills and fever  HENT: Negative  Eyes: Negative  Respiratory: Negative  Cardiovascular: Negative  Gastrointestinal: Negative  Endocrine: Negative  Genitourinary: Positive for frequency and urgency  Negative for difficulty urinating, dysuria, enuresis, flank pain and hematuria  Musculoskeletal: Negative  Skin: Negative  Allergic/Immunologic: Negative  Neurological: Negative  Hematological: Negative  Psychiatric/Behavioral: Negative  Objective:      /62 (BP Location: Left arm, Patient Position: Sitting, Cuff Size: Adult)   Pulse 61   Ht 6' (1 829 m)   Wt 91 2 kg (201 lb)   BMI 27 26 kg/m²          Physical Exam  Constitutional:       General: He is not in acute distress  Appearance: He is normal weight  He is not ill-appearing, toxic-appearing or diaphoretic  HENT:      Head: Normocephalic and atraumatic  Right Ear: External ear normal       Left Ear: External ear normal       Nose: Nose normal       Mouth/Throat:      Pharynx: Oropharynx is clear  Eyes:      General: No scleral icterus       Conjunctiva/sclera: Conjunctivae normal  Cardiovascular:      Rate and Rhythm: Normal rate  Pulses: Normal pulses  Pulmonary:      Effort: Pulmonary effort is normal  No respiratory distress  Musculoskeletal:         General: Normal range of motion  Cervical back: Normal range of motion  Skin:     General: Skin is warm and dry  Neurological:      General: No focal deficit present  Mental Status: He is alert and oriented to person, place, and time  Psychiatric:         Mood and Affect: Mood normal          Behavior: Behavior normal          Thought Content:  Thought content normal          Judgment: Judgment normal            Juan Pablo Castro PA-C

## 2021-09-27 ENCOUNTER — TELEPHONE (OUTPATIENT)
Dept: UROLOGY | Facility: AMBULATORY SURGERY CENTER | Age: 71
End: 2021-09-27

## 2021-09-27 NOTE — TELEPHONE ENCOUNTER
Able to reschedule followup appointment per patient request  Now will come in Nov 4   Other appointment canceled

## 2021-11-04 ENCOUNTER — OFFICE VISIT (OUTPATIENT)
Dept: UROLOGY | Facility: CLINIC | Age: 71
End: 2021-11-04
Payer: COMMERCIAL

## 2021-11-04 VITALS
BODY MASS INDEX: 27.9 KG/M2 | SYSTOLIC BLOOD PRESSURE: 138 MMHG | HEIGHT: 72 IN | WEIGHT: 206 LBS | DIASTOLIC BLOOD PRESSURE: 88 MMHG

## 2021-11-04 DIAGNOSIS — N13.8 BPH WITH OBSTRUCTION/LOWER URINARY TRACT SYMPTOMS: Primary | ICD-10-CM

## 2021-11-04 DIAGNOSIS — N40.1 BPH WITH OBSTRUCTION/LOWER URINARY TRACT SYMPTOMS: Primary | ICD-10-CM

## 2021-11-04 PROCEDURE — 1160F RVW MEDS BY RX/DR IN RCRD: CPT | Performed by: PHYSICIAN ASSISTANT

## 2021-11-04 PROCEDURE — 3008F BODY MASS INDEX DOCD: CPT | Performed by: PHYSICIAN ASSISTANT

## 2021-11-04 PROCEDURE — 1036F TOBACCO NON-USER: CPT | Performed by: PHYSICIAN ASSISTANT

## 2021-11-04 PROCEDURE — 99213 OFFICE O/P EST LOW 20 MIN: CPT | Performed by: PHYSICIAN ASSISTANT

## 2022-01-21 NOTE — ASSESSMENT & PLAN NOTE
Patient is status post UroLift on 07/09/2021 with Dr Stanley Field  Patient is currently reporting that he is experiencing some improvement with his urinary symptoms but continues to experience frequency and urgency  Discussed with patient the difference between storage symptoms and obstructive symptoms  Patient currently reporting that he had  A combination of both storage symptoms and obstructive symptoms prior to UroLift  Discussed that his urinary symptoms may be in combination of BPH and overactive bladder syndrome  Discussed with patient that we will continue to monitor his urinary symptoms at this time  As he is still recently post op and may have some inflammation and irritative symptoms from his procedure  Will follow-up in 3 months with a uroflow as uroflow was in adequate due to low volume this appointment  PVR 0  Discussed with patient if his urinary symptoms of frequency,urgency, and nocturia become extremely bothersome, he may call prior to his appointment and we will prescribe patient a low dose of medication  Discussed anticholinergic medications such as oxybutynin and VESIcare  Discussed side effect profile  Patient is currently agreeable to plan  Will follow-up in 3 months for symptom reassessment with PVR, uroflow and AUA symptom score  Will also resume prostate cancer screening at that time as patient has not had PSA in 1 year will not obtain at this time given recent surgical intervention  [Mother] : mother

## 2022-02-18 ENCOUNTER — OFFICE VISIT (OUTPATIENT)
Dept: URGENT CARE | Age: 72
End: 2022-02-18
Payer: COMMERCIAL

## 2022-02-18 VITALS
OXYGEN SATURATION: 99 % | TEMPERATURE: 97.6 F | SYSTOLIC BLOOD PRESSURE: 126 MMHG | RESPIRATION RATE: 16 BRPM | HEART RATE: 68 BPM | DIASTOLIC BLOOD PRESSURE: 92 MMHG

## 2022-02-18 DIAGNOSIS — H61.21 IMPACTED CERUMEN OF RIGHT EAR: Primary | ICD-10-CM

## 2022-02-18 PROCEDURE — 99213 OFFICE O/P EST LOW 20 MIN: CPT

## 2022-02-18 NOTE — PROGRESS NOTES
[facility]        NAME: Ferdie Brittle is a 73 y o  male  : 1950    MRN: [id number]  DATE: 2022  TIME: 9:32 AM    Assessment and Plan   Impacted cerumen of right ear [H61 21]  1  Impacted cerumen of right ear  carbamide peroxide (DEBROX) 6 5 % otic solution     Ear flush performed with minimal retrieval ox recommending eardrops and follow-up    Patient Instructions       Follow up with PCP in 3-5 days  Proceed to  ER if symptoms worsen  Chief Complaint     Chief Complaint   Patient presents with    Ear Fullness     right ear blockage         History of Present Illness       HPI   Patient states that he feels like his right ear is clogged, he comes every year for the past 20 years for a flush of his ear  Patient is having difficulty hearing his feels ear fullness and clogged      Review of Systems   Review of Systems  Per hpi     Current Medications       Current Outpatient Medications:     Ascorbic Acid (VITAMIN C PO), Take by mouth daily , Disp: , Rfl:     carbamide peroxide (DEBROX) 6 5 % otic solution, Administer 5 drops to the right ear 2 (two) times a day, Disp: 15 mL, Rfl: 0    Cholecalciferol (VITAMIN D3) 2000 units capsule, Take by mouth daily , Disp: , Rfl:     Coenzyme Q10 (CO Q 10) 100 MG CAPS, Take by mouth daily , Disp: , Rfl:     cyanocobalamin (VITAMIN B-12) 1,000 mcg tablet, Take 3 tablets by mouth daily, Disp: , Rfl:     docusate sodium (COLACE) 100 mg capsule, Take 1 capsule (100 mg total) by mouth 2 (two) times a day for 15 days, Disp: 30 capsule, Rfl: 0    LUTEIN PO, Take 2 tablets by mouth daily, Disp: , Rfl:     Multiple Vitamin (MULTIVITAMIN) tablet, Take 1 tablet by mouth daily, Disp: , Rfl:     Omega-3 Fatty Acids (FISH OIL) 1200 MG CPDR, Take by mouth daily , Disp: , Rfl:     Current Allergies     Allergies as of 2022    (No Known Allergies)            The following portions of the patient's history were reviewed and updated as appropriate: allergies, current medications, past family history, past medical history, past social history, past surgical history and problem list      Past Medical History:   Diagnosis Date    BPH (benign prostatic hyperplasia)     Kidney stone     isaias       Past Surgical History:   Procedure Laterality Date    COLONOSCOPY      FL RETROGRADE PYELOGRAM  11/19/2020    LITHOTRIPSY      LYMPH NODE BIOPSY  1999    cervical, path showerd toxoplasmosis, no treatment    AR CYSTO/URETERO W/LITHOTRIPSY &INDWELL STENT INSRT Left 11/19/2020    Procedure: CYSTOSCOPY URETEROSCOPY WITH LITHOTRIPSY HOLMIUM LASER, RIGHT AND LEFT RETROGRADE PYELOGRAM  AND INSERTION LEFT  STENT URETERAL;  Surgeon: Skylar Rose MD;  [facility];  Service: Urology    AR CYSTOURETHRO W/IMPLANT N/A 7/9/2021    Procedure: CYSTOSCOPY WITH INSERTION Stokes Freeze;  Surgeon: Jem Winston MD;  [facility];  Service: Urology    TONSILLECTOMY         Family History   Problem Relation Age of Onset    Colon cancer Mother         dx late 57's    Migraines Mother     Cancer Mother 61        Liver or Colon cancer?  Lung cancer Father     Alcohol abuse Neg Hx     Substance Abuse Neg Hx     Mental illness Neg Hx     Depression Neg Hx          Medications have been verified  Objective   /92 (BP Location: Left arm, Patient Position: Sitting, Cuff Size: Standard)   Pulse 68   Temp 97 6 °F (36 4 °C) (Temporal)   Resp 16   SpO2 99%   No LMP for male patient  Physical Exam     Physical Exam  Constitutional:       General: He is not in acute distress  Appearance: He is well-developed  He is not diaphoretic  HENT:      Head: Normocephalic and atraumatic  Right Ear: Tympanic membrane and external ear normal  There is impacted cerumen  Left Ear: Tympanic membrane and external ear normal       Mouth/Throat:      Mouth: Mucous membranes are moist       Pharynx: Oropharynx is clear   No oropharyngeal exudate  Eyes:      Extraocular Movements: Extraocular movements intact  Conjunctiva/sclera: Conjunctivae normal    Cardiovascular:      Rate and Rhythm: Normal rate and regular rhythm  Heart sounds: Normal heart sounds  Pulmonary:      Effort: Pulmonary effort is normal  No respiratory distress  Breath sounds: Normal breath sounds  No wheezing  Abdominal:      General: Bowel sounds are normal  There is no distension  Palpations: Abdomen is soft  There is no mass  Tenderness: There is no abdominal tenderness  Musculoskeletal:         General: No swelling or tenderness  Cervical back: Normal range of motion  Right lower leg: No edema  Left lower leg: No edema  Lymphadenopathy:      Cervical: No cervical adenopathy  Skin:     General: Skin is warm  Neurological:      Mental Status: He is alert and oriented to person, place, and time

## 2022-02-21 ENCOUNTER — OFFICE VISIT (OUTPATIENT)
Dept: INTERNAL MEDICINE CLINIC | Facility: CLINIC | Age: 72
End: 2022-02-21
Payer: COMMERCIAL

## 2022-02-21 VITALS
HEIGHT: 72 IN | DIASTOLIC BLOOD PRESSURE: 64 MMHG | TEMPERATURE: 98 F | WEIGHT: 201 LBS | HEART RATE: 73 BPM | SYSTOLIC BLOOD PRESSURE: 120 MMHG | OXYGEN SATURATION: 97 % | BODY MASS INDEX: 27.22 KG/M2 | RESPIRATION RATE: 17 BRPM

## 2022-02-21 DIAGNOSIS — H61.21 IMPACTED CERUMEN OF RIGHT EAR: Primary | ICD-10-CM

## 2022-02-21 DIAGNOSIS — E78.00 ELEVATED LDL CHOLESTEROL LEVEL: ICD-10-CM

## 2022-02-21 DIAGNOSIS — K76.0 FATTY LIVER: ICD-10-CM

## 2022-02-21 PROCEDURE — 69209 REMOVE IMPACTED EAR WAX UNI: CPT | Performed by: INTERNAL MEDICINE

## 2022-02-21 PROCEDURE — 1036F TOBACCO NON-USER: CPT | Performed by: INTERNAL MEDICINE

## 2022-02-21 PROCEDURE — 3008F BODY MASS INDEX DOCD: CPT | Performed by: INTERNAL MEDICINE

## 2022-02-21 PROCEDURE — 99213 OFFICE O/P EST LOW 20 MIN: CPT | Performed by: INTERNAL MEDICINE

## 2022-02-21 PROCEDURE — 1160F RVW MEDS BY RX/DR IN RCRD: CPT | Performed by: INTERNAL MEDICINE

## 2022-02-21 NOTE — PATIENT INSTRUCTIONS
1  Keep ear dry today  2  See information below    Earwax Blockage   AMBULATORY CARE:   Earwax  can build up in your ear canal and cause a blockage  Earwax blockage happens when your ear makes earwax faster than your body can remove it  Common symptoms include the following:   · Trouble hearing    · Earache    · Ear fullness or a feeling that something is plugging up your ear    · Itching or ringing in your ear    · Dizziness    Seed immediate care if:   · You feel dizzy  · You have discharge or blood coming out of your ear  · Your ear pain does not go away or gets worse  Call your doctor if:   · You have a fever  · You have trouble hearing or hear ringing noises  · You have questions or concerns about your condition or care  Treatment for earwax blockage:   · Medicines  placed in the ear canal can soften the earwax so it will come out  · Flushing your ear canal  with warm water may flush out the earwax  · Small medical tools  may be used to remove the earwax  How to prevent earwax blockage:  Do not stick anything into your ears to clean them  Use cotton swabs on the outside of your ear only  Ask your healthcare provider for more information on ways to prevent blockage  Follow up with your doctor as directed:  Write down your questions so you remember to ask them during your visits  © Copyright [employer] 2021 Information is for End User's use only and may not be sold, redistributed or otherwise used for commercial purposes  All illustrations and images included in CareNotes® are the copyrighted property of A D A M , Inc  or Elvira Cruz   The above information is an  only  It is not intended as medical advice for individual conditions or treatments  Talk to your doctor, nurse or pharmacist before following any medical regimen to see if it is safe and effective for you

## 2022-02-21 NOTE — PROGRESS NOTES
Assessment/Plan:     Diagnoses and all orders for this visit:    Impacted cerumen of right ear  Comments:  successful irrigation of right ear by Maria Teresa Shakns MA   dry ear precautions advised to patient  t/c debrox OTC at home in future prn   f/u as scheduled    Fatty liver  -     Comprehensive metabolic panel; Future    Elevated LDL cholesterol level  -     Lipid Panel with Direct LDL reflex; Future    Other orders  -     Ear cerumen removal          Subjective:      Patient ID: Tatiana Tello is a 73 y o  male  HPI    Here for follow up and c/o right ear fullness and feels like wax is built up in ear  Has had this before, occurs at least once a year  No ear pain, fever or previous ear surgery  Went to  last Friday and had an attempt at ear wash but was unsuccessful  He is otherwise doing well, no other complaints  Past Medical History:   Diagnosis Date    BPH (benign prostatic hyperplasia)     Kidney stone     isaias     Vitals:    02/21/22 1028   BP: 120/64   BP Location: Left arm   Patient Position: Sitting   Cuff Size: Adult   Pulse: 73   Resp: 17   Temp: 98 °F (36 7 °C)   TempSrc: Tympanic   SpO2: 97%   Weight: 91 2 kg (201 lb)   Height: 6' (1 829 m)     Body mass index is 27 26 kg/m²      Current Outpatient Medications:     Ascorbic Acid (VITAMIN C PO), Take by mouth daily , Disp: , Rfl:     carbamide peroxide (DEBROX) 6 5 % otic solution, Administer 5 drops to the right ear 2 (two) times a day, Disp: 15 mL, Rfl: 0    Cholecalciferol (VITAMIN D3) 2000 units capsule, Take by mouth daily , Disp: , Rfl:     Coenzyme Q10 (CO Q 10) 100 MG CAPS, Take by mouth daily , Disp: , Rfl:     cyanocobalamin (VITAMIN B-12) 1,000 mcg tablet, Take 3 tablets by mouth daily, Disp: , Rfl:     docusate sodium (COLACE) 100 mg capsule, Take 1 capsule (100 mg total) by mouth 2 (two) times a day for 15 days, Disp: 30 capsule, Rfl: 0    LUTEIN PO, Take 2 tablets by mouth daily, Disp: , Rfl:     Multiple Vitamin (MULTIVITAMIN) tablet, Take 1 tablet by mouth daily, Disp: , Rfl:     Omega-3 Fatty Acids (FISH OIL) 1200 MG CPDR, Take by mouth daily , Disp: , Rfl:   No Known Allergies      Review of Systems   Constitutional: Negative for fever  HENT: Negative for ear discharge and ear pain  Respiratory: Negative for shortness of breath  Cardiovascular: Negative for chest pain  Gastrointestinal: Negative for abdominal pain  Allergic/Immunologic: Negative for immunocompromised state  Neurological: Negative for dizziness  Psychiatric/Behavioral: Negative for dysphoric mood  Objective:      /64 (BP Location: Left arm, Patient Position: Sitting, Cuff Size: Adult)   Pulse 73   Temp 98 °F (36 7 °C) (Tympanic)   Resp 17   Ht 6' (1 829 m)   Wt 91 2 kg (201 lb)   SpO2 97%   BMI 27 26 kg/m²          Physical Exam  Vitals reviewed  Constitutional:       Appearance: Normal appearance  HENT:      Head: Normocephalic and atraumatic  Right Ear: There is impacted cerumen  Left Ear: Tympanic membrane normal  There is no impacted cerumen  Cardiovascular:      Rate and Rhythm: Normal rate and regular rhythm  Heart sounds: No murmur heard  Pulmonary:      Effort: Pulmonary effort is normal       Breath sounds: No wheezing or rales  Abdominal:      General: Bowel sounds are normal       Palpations: Abdomen is soft  Musculoskeletal:      Right lower leg: No edema  Left lower leg: No edema  Neurological:      Mental Status: He is alert  Psychiatric:         Mood and Affect: Mood normal          Behavior: Behavior normal              Ear cerumen removal    Date/Time: 2/21/2022 10:40 AM  Performed by: Salinas Lomeli DO  Authorized by: Salinas Lomeli DO   Universal Protocol:  Consent: Verbal consent obtained    Risks and benefits: risks, benefits and alternatives were discussed  Consent given by: patient  Patient identity confirmed: verbally with patient      Patient location: Clinic  Indications / Diagnosis:  Cerumen impaction  Procedure details:     Location:  R ear    Procedure type: irrigation only      Approach:  External  Post-procedure details:     Complication:  None    Hearing quality:  Normal    Patient tolerance of procedure:   Tolerated well, no immediate complications

## 2022-06-30 ENCOUNTER — APPOINTMENT (OUTPATIENT)
Dept: LAB | Facility: CLINIC | Age: 72
End: 2022-06-30
Payer: COMMERCIAL

## 2022-06-30 DIAGNOSIS — E78.00 ELEVATED LDL CHOLESTEROL LEVEL: ICD-10-CM

## 2022-06-30 DIAGNOSIS — K76.0 FATTY LIVER: ICD-10-CM

## 2022-06-30 LAB
ALBUMIN SERPL BCP-MCNC: 4 G/DL (ref 3.5–5)
ALP SERPL-CCNC: 81 U/L (ref 34–104)
ALT SERPL W P-5'-P-CCNC: 18 U/L (ref 7–52)
ANION GAP SERPL CALCULATED.3IONS-SCNC: 6 MMOL/L (ref 4–13)
AST SERPL W P-5'-P-CCNC: 16 U/L (ref 13–39)
BILIRUB SERPL-MCNC: 0.56 MG/DL (ref 0.2–1)
BUN SERPL-MCNC: 17 MG/DL (ref 5–25)
CALCIUM SERPL-MCNC: 8.9 MG/DL (ref 8.4–10.2)
CHLORIDE SERPL-SCNC: 106 MMOL/L (ref 96–108)
CHOLEST SERPL-MCNC: 175 MG/DL
CO2 SERPL-SCNC: 26 MMOL/L (ref 21–32)
CREAT SERPL-MCNC: 0.91 MG/DL (ref 0.6–1.3)
GFR SERPL CREATININE-BSD FRML MDRD: 85 ML/MIN/1.73SQ M
GLUCOSE P FAST SERPL-MCNC: 95 MG/DL (ref 65–99)
HDLC SERPL-MCNC: 40 MG/DL
LDLC SERPL CALC-MCNC: 110 MG/DL (ref 0–100)
POTASSIUM SERPL-SCNC: 4.2 MMOL/L (ref 3.5–5.3)
PROT SERPL-MCNC: 7 G/DL (ref 6.4–8.4)
SODIUM SERPL-SCNC: 138 MMOL/L (ref 135–147)
TRIGL SERPL-MCNC: 127 MG/DL

## 2022-06-30 PROCEDURE — 80061 LIPID PANEL: CPT

## 2022-06-30 PROCEDURE — 36415 COLL VENOUS BLD VENIPUNCTURE: CPT

## 2022-06-30 PROCEDURE — 80053 COMPREHEN METABOLIC PANEL: CPT

## 2022-07-01 ENCOUNTER — OFFICE VISIT (OUTPATIENT)
Dept: INTERNAL MEDICINE CLINIC | Facility: CLINIC | Age: 72
End: 2022-07-01
Payer: COMMERCIAL

## 2022-07-01 VITALS
WEIGHT: 203 LBS | OXYGEN SATURATION: 98 % | SYSTOLIC BLOOD PRESSURE: 130 MMHG | HEART RATE: 73 BPM | TEMPERATURE: 97 F | HEIGHT: 72 IN | RESPIRATION RATE: 16 BRPM | BODY MASS INDEX: 27.5 KG/M2 | DIASTOLIC BLOOD PRESSURE: 88 MMHG

## 2022-07-01 DIAGNOSIS — Z00.00 MEDICARE ANNUAL WELLNESS VISIT, SUBSEQUENT: ICD-10-CM

## 2022-07-01 DIAGNOSIS — R79.89 LOW VITAMIN D LEVEL: Primary | ICD-10-CM

## 2022-07-01 DIAGNOSIS — Z91.89 FRAMINGHAM CARDIAC RISK 10-20% IN NEXT 10 YEARS: ICD-10-CM

## 2022-07-01 DIAGNOSIS — R06.83 SNORING: ICD-10-CM

## 2022-07-01 DIAGNOSIS — E78.00 ELEVATED LDL CHOLESTEROL LEVEL: ICD-10-CM

## 2022-07-01 PROCEDURE — 3725F SCREEN DEPRESSION PERFORMED: CPT | Performed by: INTERNAL MEDICINE

## 2022-07-01 PROCEDURE — 3288F FALL RISK ASSESSMENT DOCD: CPT | Performed by: INTERNAL MEDICINE

## 2022-07-01 PROCEDURE — 1125F AMNT PAIN NOTED PAIN PRSNT: CPT | Performed by: INTERNAL MEDICINE

## 2022-07-01 PROCEDURE — G0439 PPPS, SUBSEQ VISIT: HCPCS | Performed by: INTERNAL MEDICINE

## 2022-07-01 PROCEDURE — 1160F RVW MEDS BY RX/DR IN RCRD: CPT | Performed by: INTERNAL MEDICINE

## 2022-07-01 PROCEDURE — 99213 OFFICE O/P EST LOW 20 MIN: CPT | Performed by: INTERNAL MEDICINE

## 2022-07-01 PROCEDURE — 1170F FXNL STATUS ASSESSED: CPT | Performed by: INTERNAL MEDICINE

## 2022-07-01 NOTE — PROGRESS NOTES
Assessment and Plan:     Problem List Items Addressed This Visit     [place] cardiac risk 10-20% in next 10 years    Relevant Orders    CT coronary calcium score    Low vitamin D level - Primary      Other Visit Diagnoses     Elevated LDL cholesterol level        improved from last year, c/w exercising/healthy diet    Relevant Orders    CT coronary calcium score    Snoring        he has restful sleep, feels refreshed  he defers add'l testing for sleep apnea  tips on reducing snoring discussed, see AVS for add'l details    Medicare annual wellness visit, subsequent            BMI Counseling: Body mass index is 27 53 kg/m²  The BMI is above normal  Exercise recommendations include exercising 3-5 times per week  Rationale for BMI follow-up plan is due to patient being overweight or obese  Depression Screening and Follow-up Plan: Patient was screened for depression during today's encounter  They screened negative with a PHQ-2 score of 0  Preventive health issues were discussed with patient, and age appropriate screening tests were ordered as noted in patient's After Visit Summary  Personalized health advice and appropriate referrals for health education or preventive services given if needed, as noted in patient's After Visit Summary      Patient presents for a Medicare Wellness Visit       Patient Care Team:  Brock Willis DO as PCP - General  MD Travis Winkler MD       Problem List:     Patient Active Problem List   Diagnosis    Benign prostatic hyperplasia    Prostate cancer screening    History of nephrolithiasis    Fatty liver    Low vitamin D level    Lung nodule    [place] cardiac risk 10-20% in next 10 years    Left ureteral calculus      Past Medical and Surgical History:     Past Medical History:   Diagnosis Date    BPH (benign prostatic hyperplasia)     Kidney stone     isaias     Past Surgical History:   Procedure Laterality Date    COLONOSCOPY      FL RETROGRADE PYELOGRAM  11/19/2020    LITHOTRIPSY      LYMPH NODE BIOPSY  1999    cervical, path showerd toxoplasmosis, no treatment    SD CYSTO/URETERO W/LITHOTRIPSY &INDWELL STENT INSRT Left 11/19/2020    Procedure: CYSTOSCOPY URETEROSCOPY WITH LITHOTRIPSY HOLMIUM LASER, RIGHT AND LEFT RETROGRADE PYELOGRAM  AND INSERTION LEFT  STENT URETERAL;  Surgeon: Froilan Singh MD;  [facility];  Service: Urology    SD CYSTOURETHRO W/IMPLANT N/A 7/9/2021    Procedure: CYSTOSCOPY WITH INSERTION UROLIFT;  Surgeon: Cecy Faria MD;  [facility];  Service: Urology    TONSILLECTOMY        Family History:     Family History   Problem Relation Age of Onset    Colon cancer Mother         dx late 57's    Migraines Mother     Cancer Mother 61        Liver or Colon cancer?     Lung cancer Father     Alcohol abuse Neg Hx     Substance Abuse Neg Hx     Mental illness Neg Hx     Depression Neg Hx       Social History:     Social History     Socioeconomic History    Marital status: /Civil Union     Spouse name: None    Number of children: None    Years of education: None    Highest education level: None   Occupational History    Occupation:    Tobacco Use    Smoking status: Never Smoker    Smokeless tobacco: Never Used   Vaping Use    Vaping Use: Never used   Substance and Sexual Activity    Alcohol use: Yes     Comment: 2 x per week    Drug use: No    Sexual activity: None   Other Topics Concern    None   Social History Narrative    Exercises moderately less than 3 times per week      Social Determinants of Health     Financial Resource Strain: Not on file   Food Insecurity: Not on file   Transportation Needs: Not on file   Physical Activity: Not on file   Stress: Not on file   Social Connections: Not on file   Intimate Partner Violence: Not on file   Housing Stability: Not on file      Medications and Allergies:     Current Outpatient Medications   Medication Sig Dispense Refill    Ascorbic Acid (VITAMIN C PO) Take by mouth daily       Cholecalciferol (VITAMIN D3) 2000 units capsule Take by mouth daily       Coenzyme Q10 (CO Q 10) 100 MG CAPS Take by mouth daily       cyanocobalamin (VITAMIN B-12) 1,000 mcg tablet Take 3 tablets by mouth daily      LUTEIN PO Take 2 tablets by mouth daily      Multiple Vitamin (MULTIVITAMIN) tablet Take 1 tablet by mouth daily      Omega-3 Fatty Acids (FISH OIL) 1200 MG CPDR Take by mouth daily       carbamide peroxide (DEBROX) 6 5 % otic solution Administer 5 drops to the right ear 2 (two) times a day (Patient not taking: Reported on 7/1/2022) 15 mL 0    docusate sodium (COLACE) 100 mg capsule Take 1 capsule (100 mg total) by mouth 2 (two) times a day for 15 days 30 capsule 0     No current facility-administered medications for this visit  No Known Allergies   Immunizations:     Immunization History   Administered Date(s) Administered    COVID-19 PFIZER VACCINE 0 3 ML IM 03/17/2021, 04/09/2021      Health Maintenance:         Topic Date Due    Colorectal Cancer Screening  12/20/2024    Hepatitis C Screening  Completed         Topic Date Due    DTaP,Tdap,and Td Vaccines (1 - Tdap) Never done    Pneumococcal Vaccine: 65+ Years (1 - PCV) Never done    COVID-19 Vaccine (3 - Booster for Pfizer series) 09/09/2021    Influenza Vaccine (1) 09/01/2022      Medicare Screening Tests and Risk Assessments:     Waldemar Maza is here for his Subsequent Wellness visit  Health Risk Assessment:   Patient rates overall health as good  Patient feels that their physical health rating is same  Patient is satisfied with their life  Eyesight was rated as same  Hearing was rated as same  Patient feels that their emotional and mental health rating is same  Patients states they are never, rarely angry  Patient states they are sometimes unusually tired/fatigued  Pain experienced in the last 7 days has been some  Patient's pain rating has been 5/10  Patient states that he has experienced no weight loss or gain in last 6 months  Patient reports pack pain a  Few days ago     Depression Screening:   PHQ-2 Score: 0      Fall Risk Screening: In the past year, patient has experienced: no history of falling in past year      Home Safety:  Patient does not have trouble with stairs inside or outside of their home  Patient has working smoke alarms and has working carbon monoxide detector  Home safety hazards include: none  Nutrition:   Current diet is Regular  Medications:   Patient is not currently taking any over-the-counter supplements  Patient is able to manage medications  Activities of Daily Living (ADLs)/Instrumental Activities of Daily Living (IADLs):   Walk and transfer into and out of bed and chair?: Yes  Dress and groom yourself?: Yes    Bathe or shower yourself?: Yes    Feed yourself? Yes  Do your laundry/housekeeping?: Yes  Manage your money, pay your bills and track your expenses?: Yes  Make your own meals?: Yes    Do your own shopping?: Yes    Advance Care Planning:   Living will: Yes    Advanced directive: Yes      PREVENTIVE SCREENINGS      Cardiovascular Screening:    General: Screening Current      Diabetes Screening:     General: Screening Current      Colorectal Cancer Screening:     General: Screening Current      Prostate Cancer Screening:      Due for: PSA      Osteoporosis Screening:    General: Screening Not Indicated      Abdominal Aortic Aneurysm (AAA) Screening:    Risk factors include: age between 73-69 yo        General: Screening Not Indicated      Lung Cancer Screening:     General: Screening Not Indicated      Hepatitis C Screening:    General: Screening Current    Screening, Brief Intervention, and Referral to Treatment (SBIRT)    Screening  Typical number of drinks in a day: 0  Typical number of drinks in a week: 1  Interpretation: Low risk drinking behavior      Single Item Drug Screening:  How often have you used an illegal drug (including marijuana) or a prescription medication for non-medical reasons in the past year? never    Single Item Drug Screen Score: 0  Interpretation: Negative screen for possible drug use disorder      /88   Pulse 73   Temp (!) 97 °F (36 1 °C) (Probe)   Resp 16   Ht 6' (1 829 m)   Wt 92 1 kg (203 lb)   SpO2 98%   BMI 27 53 kg/m²          Tommy Hermosillo, DO

## 2022-07-01 NOTE — PATIENT INSTRUCTIONS
CAT scan calcium score  Return every 12 months or sooner if questions  Snoring   AMBULATORY CARE:   Snoring  is more common in men, older adults, and people who are overweight  You are more likely to snore after you drink alcohol or take medicines that make you drowsy or relaxed  Women are more likely to snore in the later stages of pregnancy  You are more likely to snore if you have a cold, stuffy nose, or throat problems, such as tonsillitis  A deviated septum can also cause snoring  The septum is in the middle of the nose and divides your nostrils  A septum that is deviated is not in the correct place  Call your doctor if:   You wake up often during the night  You feel more tired than usual during the day  You have a hard time staying awake during the day  You have headaches or feel depressed often  You have questions or concerns about your condition or care  Help decrease your snoring:   Change your sleep position  Try a different sleep position, such as lying on your side  This may help decrease snoring  Use a dental device  You may need to use a dental device while you sleep  It is similar to a retainer or mouth guard  The device helps keep your airway open while you sleep  Ask your healthcare provider or dentist for more information  Maintain a healthy weight  Ask your healthcare provider what a healthy weight is for you  Ask him or her to help you create a safe weight loss plan if you are overweight  Even a small goal of a 10% weight loss can improve your snoring  Exercise regularly  Ask about the best exercise plan for you  Exercise can help reduce snoring and help you sleep better at night  Follow up with your doctor as directed:  Write down your questions so you remember to ask them during your visits  © Copyright [employer] 2022 Information is for End User's use only and may not be sold, redistributed or otherwise used for commercial purposes   All illustrations and images included in CareNotes® are the copyrighted property of A D A M , Inc  or Elvira Cruz   The above information is an  only  It is not intended as medical advice for individual conditions or treatments  Talk to your doctor, nurse or pharmacist before following any medical regimen to see if it is safe and effective for you

## 2022-07-01 NOTE — PROGRESS NOTES
Assessment/Plan:     Diagnoses and all orders for this visit:    Low vitamin D level  Comments:  taking Vit D OTC, c/w rx and check level with next BW    [place] cardiac risk 10-20% in next 10 years  Comments:  CT calcium score ordered, d/w patient risks/benefits of test  Orders:  -     CT coronary calcium score; Future    Elevated LDL cholesterol level  Comments:  improved from last year, c/w exercising/healthy diet  Orders:  -     CT coronary calcium score; Future    Snoring  Comments:  he has restful sleep, feels refreshed  he defers add'l testing for sleep apnea  tips on reducing snoring discussed, see AVS for add'l details    Medicare annual wellness visit, subsequent          Subjective:      Patient ID: Suni Bai is a 73 y o  male  HPI    Here for physical, not taking any medications on a regular basis except OTC meds  Exercising regularly and otherwise feeling well  Completed BW before today's appt which was reviewed with patient today  He has not had a coronary calcium score in the past but is interested in having this done  ROS Otherwise negative, no other complaints  Past Medical History:   Diagnosis Date    BPH (benign prostatic hyperplasia)     Kidney stone     isaias     Vitals:    07/01/22 0802 07/01/22 0810 07/01/22 0829   BP: 140/100 130/100 130/88   BP Location: Left arm Left arm    Patient Position: Sitting Sitting    Cuff Size: Adult Adult    Pulse: 73     Resp: 16     Temp: (!) 97 °F (36 1 °C)     TempSrc: Probe     SpO2: 98%     Weight: 92 1 kg (203 lb)     Height: 6' (1 829 m)       Body mass index is 27 53 kg/m²      Current Outpatient Medications:     Ascorbic Acid (VITAMIN C PO), Take by mouth daily , Disp: , Rfl:     Cholecalciferol (VITAMIN D3) 2000 units capsule, Take by mouth daily , Disp: , Rfl:     Coenzyme Q10 (CO Q 10) 100 MG CAPS, Take by mouth daily , Disp: , Rfl:     cyanocobalamin (VITAMIN B-12) 1,000 mcg tablet, Take 3 tablets by mouth daily, Disp: , Rfl:   LUTEIN PO, Take 2 tablets by mouth daily, Disp: , Rfl:     Multiple Vitamin (MULTIVITAMIN) tablet, Take 1 tablet by mouth daily, Disp: , Rfl:     Omega-3 Fatty Acids (FISH OIL) 1200 MG CPDR, Take by mouth daily , Disp: , Rfl:     carbamide peroxide (DEBROX) 6 5 % otic solution, Administer 5 drops to the right ear 2 (two) times a day (Patient not taking: Reported on 7/1/2022), Disp: 15 mL, Rfl: 0    docusate sodium (COLACE) 100 mg capsule, Take 1 capsule (100 mg total) by mouth 2 (two) times a day for 15 days, Disp: 30 capsule, Rfl: 0  No Known Allergies      Review of Systems   Constitutional: Negative for fever  HENT: Negative for congestion  Eyes: Negative for visual disturbance  Respiratory: Negative for shortness of breath  Cardiovascular: Negative for chest pain  Gastrointestinal: Negative for abdominal pain  Endocrine: Negative for polyuria  Genitourinary: Negative for difficulty urinating  Musculoskeletal: Negative for arthralgias  Skin: Negative for rash  Allergic/Immunologic: Negative for immunocompromised state  Neurological: Negative for dizziness  Psychiatric/Behavioral: Negative for dysphoric mood  Objective:      /88   Pulse 73   Temp (!) 97 °F (36 1 °C) (Probe)   Resp 16   Ht 6' (1 829 m)   Wt 92 1 kg (203 lb)   SpO2 98%   BMI 27 53 kg/m²          Physical Exam  Vitals reviewed  Constitutional:       Appearance: Normal appearance  HENT:      Head: Normocephalic and atraumatic  Right Ear: Tympanic membrane normal       Left Ear: Tympanic membrane normal    Eyes:      Conjunctiva/sclera: Conjunctivae normal    Cardiovascular:      Rate and Rhythm: Normal rate and regular rhythm  Heart sounds: No murmur heard  Pulmonary:      Effort: Pulmonary effort is normal       Breath sounds: No wheezing or rales  Abdominal:      General: Bowel sounds are normal       Palpations: Abdomen is soft  Tenderness:  There is no abdominal tenderness  Musculoskeletal:      Right lower leg: No edema  Left lower leg: No edema  Neurological:      Mental Status: He is alert  Mental status is at baseline     Psychiatric:         Mood and Affect: Mood normal          Behavior: Behavior normal          Results for orders placed or performed in visit on 06/30/22   Comprehensive metabolic panel   Result Value Ref Range    Sodium 138 135 - 147 mmol/L    Potassium 4 2 3 5 - 5 3 mmol/L    Chloride 106 96 - 108 mmol/L    CO2 26 21 - 32 mmol/L    ANION GAP 6 4 - 13 mmol/L    BUN 17 5 - 25 mg/dL    Creatinine 0 91 0 60 - 1 30 mg/dL    Glucose, Fasting 95 65 - 99 mg/dL    Calcium 8 9 8 4 - 10 2 mg/dL    AST 16 13 - 39 U/L    ALT 18 7 - 52 U/L    Alkaline Phosphatase 81 34 - 104 U/L    Total Protein 7 0 6 4 - 8 4 g/dL    Albumin 4 0 3 5 - 5 0 g/dL    Total Bilirubin 0 56 0 20 - 1 00 mg/dL    eGFR 85 ml/min/1 73sq m   Lipid Panel with Direct LDL reflex   Result Value Ref Range    Cholesterol 175 See Comment mg/dL    Triglycerides 127 See Comment mg/dL    HDL, Direct 40 >=40 mg/dL    LDL Calculated 110 (H) 0 - 100 mg/dL

## 2022-08-15 DIAGNOSIS — R06.83 LOUD SNORING: Primary | ICD-10-CM

## 2022-08-15 NOTE — PROGRESS NOTES
Wife here in the office worried that Justyn Qiu has sleep apnea    He snores very loudly    He deferred on sleep study at his last OV with me, but she would like him tested for BENI    Plan -home sleep study ordered

## 2022-08-22 ENCOUNTER — TELEPHONE (OUTPATIENT)
Dept: SLEEP CENTER | Facility: CLINIC | Age: 72
End: 2022-08-22

## 2022-08-22 NOTE — TELEPHONE ENCOUNTER
----- Message from Winsome Michaud MD sent at 8/21/2022  7:29 PM EDT -----  Mark Oliveira      ----- Message -----  From: Rossy Linares  Sent: 6/45/7844  10:33 AM EDT  To: [place] [facility]    This [place] sleep study needs approval      If approved please sign and return to clerical pool  If denied please include reasons why  Also provide alternative testing if warranted  Please sign and return to clerical pool

## 2023-01-10 ENCOUNTER — TELEPHONE (OUTPATIENT)
Dept: OTHER | Facility: OTHER | Age: 73
End: 2023-01-10

## 2023-01-10 NOTE — TELEPHONE ENCOUNTER
Patient's wife called in wanting to notify Dr Sharon Rosas that the patient will be having his sleep study Wednesday into Thursday  Sleep Medicine office informed patient they need to let his PCP know about this or else the sleep study would be cancelled  Any questions or concerns, please call patient back

## 2023-01-11 ENCOUNTER — HOSPITAL ENCOUNTER (OUTPATIENT)
Dept: SLEEP CENTER | Facility: CLINIC | Age: 73
Discharge: HOME | End: 2023-01-11

## 2023-01-11 DIAGNOSIS — R06.83 LOUD SNORING: ICD-10-CM

## 2023-01-12 NOTE — PROGRESS NOTES
Home Sleep Study Documentation    HOME STUDY DEVICE: Noxturnal yes                                           Shagufta G3 no      Pre-Sleep Home Study:    Set-up and instructions performed by: [facility]    Technician performed demonstration for Patient: yes    Return demonstration performed by Patient: yes    Written instructions provided to Patient: yes    Patient signed consent form: yes        Post-Sleep Home Study:    Additional comments by Patient: pending    Home Sleep Study Failed:pending    Failure reason: pending    Reported or Detected: pending    Scored by: pending

## 2023-01-18 ENCOUNTER — TELEPHONE (OUTPATIENT)
Dept: INTERNAL MEDICINE CLINIC | Facility: CLINIC | Age: 73
End: 2023-01-18

## 2023-01-18 DIAGNOSIS — G47.30 SLEEP APNEA, UNSPECIFIED TYPE: Primary | ICD-10-CM

## 2023-01-18 NOTE — TELEPHONE ENCOUNTER
----- Message from Vanda Lundborg, DO sent at 1/17/2023  5:54 PM EST -----  Sleep study is back and shows severe sleep apnea  Keaganyael Minh needs to call sleep center to set up a home test with CPAP known as 'CPAP titration test'  Also recommend he see Dr Yomi Givens from sleep medicine    Pls enter referral if he agrees, thx

## 2023-01-23 ENCOUNTER — TELEPHONE (OUTPATIENT)
Dept: SLEEP CENTER | Facility: CLINIC | Age: 73
End: 2023-01-23

## 2023-01-23 NOTE — TELEPHONE ENCOUNTER
Sleep study shows severe BENI   AHI is [age]   Dr Eve Cadena asking for consult with Dr Jenni Connolly     Left call back message

## 2023-06-21 ENCOUNTER — VBI (OUTPATIENT)
Dept: ADMINISTRATIVE | Facility: OTHER | Age: 73
End: 2023-06-21

## 2023-06-30 ENCOUNTER — RA CDI HCC (OUTPATIENT)
Dept: OTHER | Facility: HOSPITAL | Age: 73
End: 2023-06-30

## 2023-06-30 NOTE — PROGRESS NOTES
[facility]  coding opportunities       Chart reviewed, no opportunity found:   [address]        Patients Insurance     Medicare Insurance: [employer]

## 2023-07-07 ENCOUNTER — OFFICE VISIT (OUTPATIENT)
Dept: INTERNAL MEDICINE CLINIC | Facility: CLINIC | Age: 73
End: 2023-07-07
Payer: COMMERCIAL

## 2023-07-07 VITALS
SYSTOLIC BLOOD PRESSURE: 118 MMHG | HEART RATE: 69 BPM | DIASTOLIC BLOOD PRESSURE: 78 MMHG | OXYGEN SATURATION: 97 % | WEIGHT: 198.4 LBS | BODY MASS INDEX: 26.87 KG/M2 | HEIGHT: 72 IN | TEMPERATURE: 98 F

## 2023-07-07 DIAGNOSIS — Z13.6 ENCOUNTER FOR LIPID SCREENING FOR CARDIOVASCULAR DISEASE: ICD-10-CM

## 2023-07-07 DIAGNOSIS — K76.0 FATTY LIVER: ICD-10-CM

## 2023-07-07 DIAGNOSIS — N40.0 BENIGN PROSTATIC HYPERPLASIA WITHOUT LOWER URINARY TRACT SYMPTOMS: ICD-10-CM

## 2023-07-07 DIAGNOSIS — Z12.5 SCREENING FOR PROSTATE CANCER: ICD-10-CM

## 2023-07-07 DIAGNOSIS — G47.33 OSA (OBSTRUCTIVE SLEEP APNEA): ICD-10-CM

## 2023-07-07 DIAGNOSIS — H61.21 IMPACTED CERUMEN OF RIGHT EAR: Primary | ICD-10-CM

## 2023-07-07 DIAGNOSIS — Z13.220 ENCOUNTER FOR LIPID SCREENING FOR CARDIOVASCULAR DISEASE: ICD-10-CM

## 2023-07-07 DIAGNOSIS — R79.89 LOW VITAMIN D LEVEL: ICD-10-CM

## 2023-07-07 DIAGNOSIS — Z00.00 MEDICARE ANNUAL WELLNESS VISIT, SUBSEQUENT: ICD-10-CM

## 2023-07-07 PROCEDURE — 69209 REMOVE IMPACTED EAR WAX UNI: CPT | Performed by: INTERNAL MEDICINE

## 2023-07-07 PROCEDURE — 99214 OFFICE O/P EST MOD 30 MIN: CPT | Performed by: INTERNAL MEDICINE

## 2023-07-07 PROCEDURE — G0439 PPPS, SUBSEQ VISIT: HCPCS | Performed by: INTERNAL MEDICINE

## 2023-07-07 NOTE — PROGRESS NOTES
Assessment and Plan:     Problem List Items Addressed This Visit     Benign prostatic hyperplasia without lower urinary tract symptoms    Fatty liver    Relevant Orders    Comprehensive metabolic panel    CBC and differential    Low vitamin D level    Relevant Orders    Vitamin D 25 hydroxy    BENI (obstructive sleep apnea)   Other Visit Diagnoses     Impacted cerumen of right ear    -  Primary    improved with ear irrigation in office today. dry ear precautions advised for today. advised to use debrox OTC as needed but wait till tomorrow    Relevant Orders    Ear cerumen removal    Encounter for lipid screening for cardiovascular disease        Relevant Orders    Lipid Panel with Direct LDL reflex    Medicare annual wellness visit, subsequent        Screening for prostate cancer        Relevant Orders    PSA, Total Screen          Depression Screening and Follow-up Plan: Patient was screened for depression during today's encounter. They screened negative with a PHQ-2 score of 0. Preventive health issues were discussed with patient, and age appropriate screening tests were ordered as noted in patient's After Visit Summary. Personalized health advice and appropriate referrals for health education or preventive services given if needed, as noted in patient's After Visit Summary.     Patient presents for a Medicare Wellness Visit     Patient Care Team:  Julianna Sterling DO as PCP - General  MD Sheree Peacock MD       Problem List:     Patient Active Problem List   Diagnosis   • Benign prostatic hyperplasia without lower urinary tract symptoms   • History of nephrolithiasis   • Fatty liver   • Low vitamin D level   • Lung nodule   • Monument cardiac risk 10-20% in next 10 years   • Left ureteral calculus   • BENI (obstructive sleep apnea)      Past Medical and Surgical History:     Past Medical History:   Diagnosis Date   • BPH (benign prostatic hyperplasia)    • Kidney stone isaias     Past Surgical History:   Procedure Laterality Date   • COLONOSCOPY     • FL RETROGRADE PYELOGRAM  11/19/2020   • LITHOTRIPSY     • LYMPH NODE BIOPSY  1999    cervical, path showerd toxoplasmosis, no treatment   • GA CYSTO INSERTION TRANSPROSTATIC IMPLANT SINGLE N/A 7/9/2021    Procedure: CYSTOSCOPY WITH INSERTION UROLIFT;  Surgeon: Jose Carlos De Jesus MD;  Location: [facility];  Service: Urology   • GA CYSTO/URETERO W/LITHOTRIPSY &INDWELL STENT INSRT Left 11/19/2020    Procedure: CYSTOSCOPY URETEROSCOPY WITH LITHOTRIPSY HOLMIUM LASER, RIGHT AND LEFT RETROGRADE PYELOGRAM  AND INSERTION LEFT  STENT URETERAL;  Surgeon: Adriana Hester MD;  Location: [facility];  Service: Urology   • TONSILLECTOMY        Family History:     Family History   Problem Relation Age of Onset   • Colon cancer Mother         dx late 63's   • Migraines Mother    • Cancer Mother 61        Liver or Colon cancer?    • Lung cancer Father    • Alcohol abuse Neg Hx    • Substance Abuse Neg Hx    • Mental illness Neg Hx    • Depression Neg Hx       Social History:     Social History     Socioeconomic History   • Marital status: /Civil Union     Spouse name: None   • Number of children: None   • Years of education: None   • Highest education level: None   Occupational History   • Occupation:    Tobacco Use   • Smoking status: Never   • Smokeless tobacco: Never   Vaping Use   • Vaping Use: Never used   Substance and Sexual Activity   • Alcohol use: Yes     Comment: 2 x per week   • Drug use: No   • Sexual activity: None   Other Topics Concern   • None   Social History Narrative    Exercises moderately less than 3 times per week      Social Determinants of Health     Financial Resource Strain: Low Risk  (7/7/2023)    Overall Financial Resource Strain (CARDIA)    • Difficulty of Paying Living Expenses: Not hard at all   Food Insecurity: Not on file   Transportation Needs: No Transportation Needs (7/7/2023)    PRAPARE - Transportation    • Lack of Transportation (Medical): No    • Lack of Transportation (Non-Medical): No   Physical Activity: Not on file   Stress: Not on file   Social Connections: Not on file   Intimate Partner Violence: Not on file   Housing Stability: Not on file      Medications and Allergies:     Current Outpatient Medications   Medication Sig Dispense Refill   • Ascorbic Acid (VITAMIN C PO) Take by mouth daily      • carbamide peroxide (DEBROX) 6.5 % otic solution Administer 5 drops to the right ear 2 (two) times a day 15 mL 0   • Cholecalciferol (VITAMIN D3) 2000 units capsule Take by mouth daily      • Coenzyme Q10 (CO Q 10) 100 MG CAPS Take by mouth daily      • cyanocobalamin (VITAMIN B-12) 1,000 mcg tablet Take 3 tablets by mouth daily     • LUTEIN PO Take 2 tablets by mouth daily     • Multiple Vitamin (MULTIVITAMIN) tablet Take 1 tablet by mouth daily     • Omega-3 Fatty Acids (FISH OIL) 1200 MG CPDR Take by mouth daily      • docusate sodium (COLACE) 100 mg capsule Take 1 capsule (100 mg total) by mouth 2 (two) times a day for 15 days 30 capsule 0     No current facility-administered medications for this visit. No Known Allergies   Immunizations:     Immunization History   Administered Date(s) Administered   • COVID-19 PFIZER VACCINE 0.3 ML IM 03/17/2021, 04/09/2021      Health Maintenance:         Topic Date Due   • Colorectal Cancer Screening  12/20/2024   • Hepatitis C Screening  Completed         Topic Date Due   • Pneumococcal Vaccine: 65+ Years (1 - PCV) Never done   • COVID-19 Vaccine (3 - Pfizer series) 06/04/2021   • Influenza Vaccine (1) 09/01/2023      Medicare Screening Tests and Risk Assessments:     Libby Chase is here for his Subsequent Wellness visit. Health Risk Assessment:   Patient rates overall health as very good. Patient feels that their physical health rating is same. Patient is satisfied with their life. Eyesight was rated as slightly worse. Hearing was rated as same.  Patient feels that their emotional and mental health rating is same. Patients states they are never, rarely angry. Patient states they are sometimes unusually tired/fatigued. Pain experienced in the last 7 days has been none. Patient states that he has experienced no weight loss or gain in last 6 months. Depression Screening:   PHQ-2 Score: 0      Fall Risk Screening: In the past year, patient has experienced: no history of falling in past year      Home Safety:  Patient does not have trouble with stairs inside or outside of their home. Patient has working smoke alarms and has working carbon monoxide detector. Home safety hazards include: loose rugs on the floor, poor household lighting and household clutter. Nutrition:   Current diet is Regular. Medications:   Patient is currently taking over-the-counter supplements. OTC medications include: see medication list. Patient is able to manage medications. Activities of Daily Living (ADLs)/Instrumental Activities of Daily Living (IADLs):   Walk and transfer into and out of bed and chair?: Yes  Dress and groom yourself?: Yes    Bathe or shower yourself?: Yes    Feed yourself? Yes  Do your laundry/housekeeping?: Yes  Manage your money, pay your bills and track your expenses?: Yes  Make your own meals?: Yes    Do your own shopping?: Yes    Previous Hospitalizations:   Any hospitalizations or ED visits within the last 12 months?: No      Advance Care Planning:   Living will: Yes    Durable POA for healthcare:  Yes    Advanced directive: No      PREVENTIVE SCREENINGS      Cardiovascular Screening:    General: Screening Current      Diabetes Screening:       Due for: Blood Glucose      Colorectal Cancer Screening:     General: Screening Current      Prostate Cancer Screening:      Due for: PSA      Osteoporosis Screening:    General: Screening Not Indicated      Abdominal Aortic Aneurysm (AAA) Screening:    Risk factors include: age between 70-77 yo        General: Screening Not Indicated      Lung Cancer Screening:     General: Screening Not Indicated      Hepatitis C Screening:    General: Screening Current    Screening, Brief Intervention, and Referral to Treatment (SBIRT)    Screening      AUDIT-C Screenin) How often did you have a drink containing alcohol in the past year? 2 to 3 times a week  2) How many drinks did you have on a typical day when you were drinking in the past year? 1 to 2  3) How often did you have 6 or more drinks on one occasion in the past year? never    AUDIT-C Score: 3  Interpretation: Score 0-3 (male): Negative screen for alcohol misuse    Single Item Drug Screening:  How often have you used an illegal drug (including marijuana) or a prescription medication for non-medical reasons in the past year? never    Single Item Drug Screen Score: 0  Interpretation: Negative screen for possible drug use disorder    No results found.     /78 (BP Location: Right arm, Patient Position: Sitting, Cuff Size: Standard)   Pulse 69   Temp 98 °F (36.7 °C)   Ht 6' (1.829 m)   Wt 90 kg (198 lb 6.4 oz)   SpO2 97%   BMI 26.91 kg/m²         Tommy Hermosillo DO

## 2023-07-07 NOTE — PATIENT INSTRUCTIONS
CAT scan calcium score  Fasting blood work    [facility]  Thank you for completing [place] [facility] [place] [facility] or [place] Annual Wellness Visit today. Your next wellness visit will be due in one year (7/7/2024). The screening/preventive services that you may require over the next 5-10 years are detailed below. Some tests may not apply to you based off risk factors and/or age. Screening tests ordered at today's visit but not completed yet may show as past due. Also, please note that scanned in results may not display below. Preventive Screenings:  Service Recommendations Previous Testing/Comments   Colorectal Cancer Screening  Colonoscopy    Fecal Occult Blood Test (FOBT)/Fecal Immunochemical Test (FIT)  Fecal DNA/Cologuard Test  Flexible Sigmoidoscopy Age: 43-73 years old   Colonoscopy: every 10 years (May be performed more frequently if at higher risk)  OR  FOBT/FIT: every 1 year  OR  Cologuard: every 3 years  OR  Sigmoidoscopy: every 5 years  Screening may be recommended earlier than age 39 if at higher risk for colorectal cancer. Also, an individualized decision between you and your healthcare provider will decide whether screening between the ages of 77-80 would be appropriate.  Colonoscopy: 12/20/2019  FOBT/FIT: 07/09/2020  Cologuard: Not on file  Sigmoidoscopy: Not on file    Screening Current     Prostate Cancer Screening Individualized decision between patient and health care provider in men between ages of 53-66   Medicare will cover every 12 months beginning on the day after your 50th birthday PSA: 1.2 ng/mL           Hepatitis C Screening Once for adults born between 1945 and 1965  More frequently in patients at high risk for Hepatitis C Hep C Antibody: 07/05/2019    Screening Current   Diabetes Screening 1-2 times per year if you're at risk for diabetes or have pre-diabetes Fasting glucose: 95 mg/dL (6/30/2022)  A1C: No results in last 5 years (No results in last 5 years)      Cholesterol Screening Once every 5 years if you don't have a lipid disorder. May order more often based on risk factors. Lipid panel: 06/30/2022  Screening Current      Other Preventive Screenings Covered by Medicare:  Abdominal Aortic Aneurysm (AAA) Screening: covered once if your at risk. You're considered to be at risk if you have a family history of AAA or a male between the age of 70-76 who smoking at least 100 cigarettes in your lifetime. Lung Cancer Screening: covers low dose CT scan once per year if you meet all of the following conditions: (1) Age 48-67; (2) No signs or symptoms of lung cancer; (3) Current smoker or have quit smoking within the last 15 years; (4) You have a tobacco smoking history of at least 20 pack years (packs per day x number of years you smoked); (5) You get a written order from a healthcare provider. Glaucoma Screening: covered annually if you're considered high risk: (1) You have diabetes OR (2) Family history of glaucoma OR (3)  aged 48 and older OR (3)  American aged 72 and older  Osteoporosis Screening: covered every 2 years if you meet one of the following conditions: (1) Have a vertebral abnormality; (2) On glucocorticoid therapy for more than 3 months; (3) Have primary hyperparathyroidism; (4) On osteoporosis medications and need to assess response to drug therapy. HIV Screening: covered annually if you're between the age of 14-79. Also covered annually if you are younger than 13 and older than 72 with risk factors for HIV infection. For pregnant patients, it is covered up to 3 times per pregnancy.     Immunizations:  Immunization Recommendations   Influenza Vaccine Annual influenza vaccination during flu season is recommended for all persons aged >= 6 months who do not have contraindications   Pneumococcal Vaccine   * Pneumococcal conjugate vaccine = PCV13 (Prevnar 13), PCV15 (Vaxneuvance), PCV20 (Prevnar 20)  * Pneumococcal polysaccharide vaccine = PPSV23 (Pneumovax) Adults 2364 years old: 1-3 doses may be recommended based on certain risk factors  Adults 72 years old: 1-2 doses may be recommended based off what pneumonia vaccine you previously received   Hepatitis B Vaccine 3 dose series if at intermediate or high risk (ex: diabetes, end stage renal disease, liver disease)   Tetanus (Td) Vaccine - COST NOT COVERED BY MEDICARE PART B Following completion of primary series, a booster dose should be given every 10 years to maintain immunity against tetanus. Td may also be given as tetanus wound prophylaxis. Tdap Vaccine - COST NOT COVERED BY MEDICARE PART B Recommended at least once for all adults. For pregnant patients, recommended with each pregnancy. Shingles Vaccine (Shingrix) - COST NOT COVERED BY MEDICARE PART B  2 shot series recommended in those aged 48 and above     Health Maintenance Due:      Topic Date Due    Colorectal Cancer Screening  12/20/2024    Hepatitis C Screening  Completed     Immunizations Due:      Topic Date Due    Pneumococcal Vaccine: 65+ Years (1 - PCV) Never done    COVID-19 Vaccine (3 - Pfizer series) 06/04/2021    Influenza Vaccine (1) 09/01/2023     Advance Directives   What are advance directives? Advance directives are legal documents that state your wishes and plans for medical care. These plans are made ahead of time in case you lose your ability to make decisions for yourself. Advance directives can apply to any medical decision, such as the treatments you want, and if you want to donate organs. What are the types of advance directives? There are many types of advance directives, and each state has rules about how to use them. You may choose a combination of any of the following:  Living will: This is a written record of the treatment you want. You can also choose which treatments you do not want, which to limit, and which to stop at a certain time.  This includes surgery, medicine, IV fluid, and tube feedings. Durable power of  for [facility]): This is a written record that states who you want to make healthcare choices for you when you are unable to make them for yourself. This person, called a proxy, is usually a family member or a friend. You may choose more than 1 proxy. Do not resuscitate (DNR) order:  A DNR order is used in case your heart stops beating or you stop breathing. It is a request not to have certain forms of treatment, such as CPR. A DNR order may be included in other types of advance directives. Medical directive: This covers the care that you want if you are in a coma, near death, or unable to make decisions for yourself. You can list the treatments you want for each condition. Treatment may include pain medicine, surgery, blood transfusions, dialysis, IV or tube feedings, and a ventilator (breathing machine). Values history: This document has questions about your views, beliefs, and how you feel and think about life. This information can help others choose the care that you would choose. Why are advance directives important? An advance directive helps you control your care. Although spoken wishes may be used, it is better to have your wishes written down. Spoken wishes can be misunderstood, or not followed. Treatments may be given even if you do not want them. An advance directive may make it easier for your family to make difficult choices about your care. Weight Management   Why it is important to manage your weight:  Being overweight increases your risk of health conditions such as heart disease, high blood pressure, type 2 diabetes, and certain types of cancer. It can also increase your risk for osteoarthritis, sleep apnea, and other respiratory problems. Aim for a slow, steady weight loss. Even a small amount of weight loss can lower your risk of health problems.   How to lose weight safely:  A safe and healthy way to lose weight is to eat fewer calories and get regular exercise. You can lose up about 1 pound a week by decreasing the number of calories you eat by 500 calories each day. Healthy meal plan for weight management:  A healthy meal plan includes a variety of foods, contains fewer calories, and helps you stay healthy. A healthy meal plan includes the following:  Eat whole-grain foods more often. A healthy meal plan should contain fiber. Fiber is the part of grains, fruits, and vegetables that is not broken down by your body. Whole-grain foods are healthy and provide extra fiber in your diet. Some examples of whole-grain foods are whole-wheat breads and pastas, oatmeal, brown rice, and bulgur. Eat a variety of vegetables every day. Include dark, leafy greens such as spinach, kale, jonathan greens, and mustard greens. Eat yellow and orange vegetables such as carrots, sweet potatoes, and winter squash. Eat a variety of fruits every day. Choose fresh or canned fruit (canned in its own juice or light syrup) instead of juice. Fruit juice has very little or no fiber. Eat low-fat dairy foods. Drink fat-free (skim) milk or 1% milk. Eat fat-free yogurt and low-fat cottage cheese. Try low-fat cheeses such as mozzarella and other reduced-fat cheeses. Choose meat and other protein foods that are low in fat. Choose beans or other legumes such as split peas or lentils. Choose fish, skinless poultry (chicken or turkey), or lean cuts of red meat (beef or pork). Before you cook meat or poultry, cut off any visible fat. Use less fat and oil. Try baking foods instead of frying them. Add less fat, such as margarine, sour cream, regular salad dressing and mayonnaise to foods. Eat fewer high-fat foods. Some examples of high-fat foods include french fries, doughnuts, ice cream, and cakes. Eat fewer sweets. Limit foods and drinks that are high in sugar. This includes candy, cookies, regular soda, and sweetened drinks.   Exercise:  Exercise at least 30 minutes per day on most days of the week. Some examples of exercise include walking, biking, dancing, and swimming. You can also fit in more physical activity by taking the stairs instead of the elevator or parking farther away from stores. Ask your healthcare provider about the best exercise plan for you. © Copyright [employer] 2018 Information is for End User's use only and may not be sold, redistributed or otherwise used for commercial purposes.  All illustrations and images included in CareNotes® are the copyrighted property of [employer] or  [employer]

## 2023-07-07 NOTE — PROGRESS NOTES
Name: Lilibeth Stark      : 1950      MRN: [id number]  Encounter Provider: Michael Marie DO  Encounter Date: 2023   Encounter department: [address] [place] [address]     1. Impacted cerumen of right ear  Comments:  improved with ear irrigation in office today. dry ear precautions advised for today. advised to use debrox OTC as needed but wait till tomorrow  Orders:  -     Ear cerumen removal    2. Fatty liver  Comments:  improved on last abd imaging.  t/c repeat imaging in   Orders:  -     Comprehensive metabolic panel; Future  -     CBC and differential    3. BENI (obstructive sleep apnea)  Comments:  declines to treat this against medical advice. he was apprised of the possible AE of untreated severe BENI    4. Benign prostatic hyperplasia without lower urinary tract symptoms  Comments:  he denies any urinary issues    5. Low vitamin D level  Comments:  taking vit D OTC, c/w rx  Orders:  -     Vitamin D 25 hydroxy    6. Encounter for lipid screening for cardiovascular disease  -     Lipid Panel with Direct LDL reflex; Future    7. Medicare annual wellness visit, subsequent    8. Screening for prostate cancer  -     PSA, Total Screen      BMI Counseling: Body mass index is 26.91 kg/m². The BMI is above normal. Exercise recommendations include exercising 3-5 times per week. Rationale for BMI follow-up plan is due to patient being overweight or obese. Depression Screening and Follow-up Plan: Patient was screened for depression during today's encounter. They screened negative with a PHQ-2 score of 0. Subjective      HPI     Here for follow up, Trent Brooks is overall stable. He is only taking OTC meds on a regular basis. He is staying active with exercise. He was diagnosed as having severe BENI on sleep study last year and advised to see sleep medicine but he did not go. He refuses to use a CPAP machine.   He was advised again of the importance of treating BENI(with his being severe), including to avoid having adverse cardiovascular effects from untreated BENI which can lead to significant morbidity. He did not complete CT calcium score yet. He has chronic right ear wax blockage and would like his right ear washed today. ROS Otherwise negative, no other complaints. Review of Systems   Constitutional: Negative for fever. HENT: Negative for congestion and ear discharge (but having some discomfort in right ear). Eyes: Negative for visual disturbance. Respiratory: Negative for shortness of breath. Cardiovascular: Negative for chest pain. Gastrointestinal: Negative for abdominal pain. Endocrine: Negative for polyuria. Genitourinary: Negative for difficulty urinating. Musculoskeletal: Negative for gait problem. Skin: Negative for rash. Allergic/Immunologic: Negative for immunocompromised state. Neurological: Negative for dizziness. Psychiatric/Behavioral: Negative for dysphoric mood.        Current Outpatient Medications on File Prior to Visit   Medication Sig   • Ascorbic Acid (VITAMIN C PO) Take by mouth daily    • carbamide peroxide (DEBROX) 6.5 % otic solution Administer 5 drops to the right ear 2 (two) times a day   • Cholecalciferol (VITAMIN D3) 2000 units capsule Take by mouth daily    • Coenzyme Q10 (CO Q 10) 100 MG CAPS Take by mouth daily    • cyanocobalamin (VITAMIN B-12) 1,000 mcg tablet Take 3 tablets by mouth daily   • LUTEIN PO Take 2 tablets by mouth daily   • Multiple Vitamin (MULTIVITAMIN) tablet Take 1 tablet by mouth daily   • Omega-3 Fatty Acids (FISH OIL) 1200 MG CPDR Take by mouth daily    • docusate sodium (COLACE) 100 mg capsule Take 1 capsule (100 mg total) by mouth 2 (two) times a day for 15 days       Objective     /78 (BP Location: Right arm, Patient Position: Sitting, Cuff Size: Standard)   Pulse 69   Temp 98 °F (36.7 °C)   Ht 6' (1.829 m)   Wt 90 kg (198 lb 6.4 oz)   SpO2 97%   BMI 26.91 kg/m² Physical Exam  Vitals reviewed. Constitutional:       General: He is not in acute distress. HENT:      Head: Normocephalic and atraumatic. Right Ear: There is impacted cerumen. Left Ear: Tympanic membrane normal.   Eyes:      Conjunctiva/sclera: Conjunctivae normal.   Cardiovascular:      Rate and Rhythm: Normal rate and regular rhythm. Heart sounds: No murmur heard. Pulmonary:      Effort: Pulmonary effort is normal.      Breath sounds: No wheezing or rales. Abdominal:      General: Bowel sounds are normal.      Palpations: Abdomen is soft. Tenderness: There is no abdominal tenderness. Musculoskeletal:      Right lower leg: No edema. Left lower leg: No edema. Neurological:      Mental Status: He is alert. Mental status is at baseline. Psychiatric:         Mood and Affect: Mood normal.         Behavior: Behavior normal.          Ear cerumen removal    Date/Time: 7/7/2023 8:00 AM    Performed by: Zoila Randall DO  Authorized by: Zoila Randall DO  Universal Protocol:  Procedure performed by: Rivka Sequeira MA)  Consent: Verbal consent obtained. Consent given by: patient  Patient identity confirmed: verbally with patient      Patient location:  Bedside  Procedure details:     Local anesthetic:  None    Location:  R ear    Procedure type: irrigation only      Approach:  External  Post-procedure details:     Complication:  None    Hearing quality:  Improved    Patient tolerance of procedure:   Tolerated well, no immediate complications        Tommy Hermosillo DO

## 2023-10-05 ENCOUNTER — HOSPITAL ENCOUNTER (OUTPATIENT)
Dept: CT IMAGING | Facility: HOSPITAL | Age: 73
Discharge: HOME | End: 2023-10-05
Attending: INTERNAL MEDICINE
Payer: COMMERCIAL

## 2023-10-05 ENCOUNTER — APPOINTMENT (OUTPATIENT)
Dept: LAB | Age: 73
End: 2023-10-05
Payer: COMMERCIAL

## 2023-10-05 DIAGNOSIS — Z13.220 ENCOUNTER FOR LIPID SCREENING FOR CARDIOVASCULAR DISEASE: ICD-10-CM

## 2023-10-05 DIAGNOSIS — K76.0 FATTY LIVER: ICD-10-CM

## 2023-10-05 DIAGNOSIS — E78.00 ELEVATED LDL CHOLESTEROL LEVEL: ICD-10-CM

## 2023-10-05 DIAGNOSIS — Z91.89 FRAMINGHAM CARDIAC RISK 10-20% IN NEXT 10 YEARS: ICD-10-CM

## 2023-10-05 DIAGNOSIS — Z13.6 ENCOUNTER FOR LIPID SCREENING FOR CARDIOVASCULAR DISEASE: ICD-10-CM

## 2023-10-05 LAB
25(OH)D3 SERPL-MCNC: 59 NG/ML (ref 30–100)
ALBUMIN SERPL BCP-MCNC: 4.3 G/DL (ref 3.5–5)
ALP SERPL-CCNC: 80 U/L (ref 34–104)
ALT SERPL W P-5'-P-CCNC: 18 U/L (ref 7–52)
ANION GAP SERPL CALCULATED.3IONS-SCNC: 9 MMOL/L
AST SERPL W P-5'-P-CCNC: 18 U/L (ref 13–39)
BASOPHILS # BLD AUTO: 0.04 THOUSANDS/ÂΜL (ref 0–0.1)
BASOPHILS NFR BLD AUTO: 1 % (ref 0–1)
BILIRUB SERPL-MCNC: 0.76 MG/DL (ref 0.2–1)
BUN SERPL-MCNC: 19 MG/DL (ref 5–25)
CALCIUM SERPL-MCNC: 9.5 MG/DL (ref 8.4–10.2)
CHLORIDE SERPL-SCNC: 106 MMOL/L (ref 96–108)
CHOLEST SERPL-MCNC: 208 MG/DL
CO2 SERPL-SCNC: 25 MMOL/L (ref 21–32)
CREAT SERPL-MCNC: 1 MG/DL (ref 0.6–1.3)
EOSINOPHIL # BLD AUTO: 0.1 THOUSAND/ÂΜL (ref 0–0.61)
EOSINOPHIL NFR BLD AUTO: 2 % (ref 0–6)
ERYTHROCYTE [DISTWIDTH] IN BLOOD BY AUTOMATED COUNT: 13.8 % (ref 11.6–15.1)
GFR SERPL CREATININE-BSD FRML MDRD: 75 ML/MIN/1.73SQ M
GLUCOSE P FAST SERPL-MCNC: 98 MG/DL (ref 65–99)
HCT VFR BLD AUTO: 45.1 % (ref 36.5–49.3)
HDLC SERPL-MCNC: 49 MG/DL
HGB BLD-MCNC: 15 G/DL (ref 12–17)
IMM GRANULOCYTES # BLD AUTO: 0.02 THOUSAND/UL (ref 0–0.2)
IMM GRANULOCYTES NFR BLD AUTO: 0 % (ref 0–2)
LDLC SERPL CALC-MCNC: 129 MG/DL (ref 0–100)
LYMPHOCYTES # BLD AUTO: 2.32 THOUSANDS/ÂΜL (ref 0.6–4.47)
LYMPHOCYTES NFR BLD AUTO: 35 % (ref 14–44)
MCH RBC QN AUTO: 29.4 PG (ref 26.8–34.3)
MCHC RBC AUTO-ENTMCNC: 33.3 G/DL (ref 31.4–37.4)
MCV RBC AUTO: 88 FL (ref 82–98)
MONOCYTES # BLD AUTO: 0.63 THOUSAND/ÂΜL (ref 0.17–1.22)
MONOCYTES NFR BLD AUTO: 10 % (ref 4–12)
NEUTROPHILS # BLD AUTO: 3.53 THOUSANDS/ÂΜL (ref 1.85–7.62)
NEUTS SEG NFR BLD AUTO: 52 % (ref 43–75)
NRBC BLD AUTO-RTO: 0 /100 WBCS
PLATELET # BLD AUTO: 271 THOUSANDS/UL (ref 149–390)
PMV BLD AUTO: 10.9 FL (ref 8.9–12.7)
POTASSIUM SERPL-SCNC: 4.5 MMOL/L (ref 3.5–5.3)
PROT SERPL-MCNC: 7.4 G/DL (ref 6.4–8.4)
PSA SERPL-MCNC: 1.26 NG/ML (ref 0–4)
RBC # BLD AUTO: 5.11 MILLION/UL (ref 3.88–5.62)
SODIUM SERPL-SCNC: 140 MMOL/L (ref 135–147)
TRIGL SERPL-MCNC: 152 MG/DL
WBC # BLD AUTO: 6.64 THOUSAND/UL (ref 4.31–10.16)

## 2023-10-05 PROCEDURE — 80061 LIPID PANEL: CPT

## 2023-10-05 PROCEDURE — 75571 CT HRT W/O DYE W/CA TEST: CPT

## 2023-10-05 PROCEDURE — G1004 CDSM NDSC: HCPCS

## 2023-10-05 PROCEDURE — 80053 COMPREHEN METABOLIC PANEL: CPT

## 2024-04-26 ENCOUNTER — TELEPHONE (OUTPATIENT)
Dept: OTHER | Facility: HOSPITAL | Age: 74
End: 2024-04-26

## 2024-04-26 ENCOUNTER — APPOINTMENT (EMERGENCY)
Dept: CT IMAGING | Facility: HOSPITAL | Age: 74
End: 2024-04-26
Payer: COMMERCIAL

## 2024-04-26 ENCOUNTER — HOSPITAL ENCOUNTER (EMERGENCY)
Facility: HOSPITAL | Age: 74
Discharge: HOME | End: 2024-04-26
Attending: EMERGENCY MEDICINE
Payer: COMMERCIAL

## 2024-04-26 VITALS
HEART RATE: 77 BPM | RESPIRATION RATE: 18 BRPM | OXYGEN SATURATION: 97 % | DIASTOLIC BLOOD PRESSURE: 101 MMHG | WEIGHT: 206.35 LBS | TEMPERATURE: 97.9 F | BODY MASS INDEX: 27.99 KG/M2 | SYSTOLIC BLOOD PRESSURE: 151 MMHG

## 2024-04-26 DIAGNOSIS — K76.0 HEPATIC STEATOSIS: ICD-10-CM

## 2024-04-26 DIAGNOSIS — N13.4 HYDROURETER: ICD-10-CM

## 2024-04-26 DIAGNOSIS — N20.0 NEPHROLITHIASIS: Primary | ICD-10-CM

## 2024-04-26 LAB
ALBUMIN SERPL BCP-MCNC: 4.1 G/DL (ref 3.5–5)
ALP SERPL-CCNC: 71 U/L (ref 34–104)
ALT SERPL W P-5'-P-CCNC: 13 U/L (ref 7–52)
ANION GAP SERPL CALCULATED.3IONS-SCNC: 9 MMOL/L (ref 4–13)
AST SERPL W P-5'-P-CCNC: 14 U/L (ref 13–39)
BACTERIA UR QL AUTO: ABNORMAL /HPF
BASOPHILS # BLD AUTO: 0.03 THOUSANDS/ÂΜL (ref 0–0.1)
BASOPHILS NFR BLD AUTO: 0 % (ref 0–1)
BILIRUB SERPL-MCNC: 1.09 MG/DL (ref 0.2–1)
BILIRUB UR QL STRIP: NEGATIVE
BUN SERPL-MCNC: 21 MG/DL (ref 5–25)
CALCIUM SERPL-MCNC: 9.3 MG/DL (ref 8.4–10.2)
CHLORIDE SERPL-SCNC: 104 MMOL/L (ref 96–108)
CLARITY UR: CLEAR
CO2 SERPL-SCNC: 22 MMOL/L (ref 21–32)
COLOR UR: YELLOW
CREAT SERPL-MCNC: 1.22 MG/DL (ref 0.6–1.3)
EOSINOPHIL # BLD AUTO: 0.02 THOUSAND/ÂΜL (ref 0–0.61)
EOSINOPHIL NFR BLD AUTO: 0 % (ref 0–6)
ERYTHROCYTE [DISTWIDTH] IN BLOOD BY AUTOMATED COUNT: 13.6 % (ref 11.6–15.1)
GFR SERPL CREATININE-BSD FRML MDRD: 59 ML/MIN/1.73SQ M
GLUCOSE SERPL-MCNC: 116 MG/DL (ref 65–140)
GLUCOSE UR STRIP-MCNC: NEGATIVE MG/DL
HCT VFR BLD AUTO: 43.2 % (ref 36.5–49.3)
HGB BLD-MCNC: 14.1 G/DL (ref 12–17)
HGB UR QL STRIP.AUTO: ABNORMAL
IMM GRANULOCYTES # BLD AUTO: 0.08 THOUSAND/UL (ref 0–0.2)
IMM GRANULOCYTES NFR BLD AUTO: 1 % (ref 0–2)
KETONES UR STRIP-MCNC: ABNORMAL MG/DL
LEUKOCYTE ESTERASE UR QL STRIP: ABNORMAL
LYMPHOCYTES # BLD AUTO: 1.5 THOUSANDS/ÂΜL (ref 0.6–4.47)
LYMPHOCYTES NFR BLD AUTO: 10 % (ref 14–44)
MCH RBC QN AUTO: 29 PG (ref 26.8–34.3)
MCHC RBC AUTO-ENTMCNC: 32.6 G/DL (ref 31.4–37.4)
MCV RBC AUTO: 89 FL (ref 82–98)
MONOCYTES # BLD AUTO: 1.73 THOUSAND/ÂΜL (ref 0.17–1.22)
MONOCYTES NFR BLD AUTO: 11 % (ref 4–12)
NEUTROPHILS # BLD AUTO: 12.11 THOUSANDS/ÂΜL (ref 1.85–7.62)
NEUTS SEG NFR BLD AUTO: 78 % (ref 43–75)
NITRITE UR QL STRIP: NEGATIVE
NON-SQ EPI CELLS URNS QL MICRO: ABNORMAL /HPF
NRBC BLD AUTO-RTO: 0 /100 WBCS
PH UR STRIP.AUTO: 5.5 [PH]
PLATELET # BLD AUTO: 241 THOUSANDS/UL (ref 149–390)
PMV BLD AUTO: 9.9 FL (ref 8.9–12.7)
POTASSIUM SERPL-SCNC: 4.2 MMOL/L (ref 3.5–5.3)
PROT SERPL-MCNC: 7 G/DL (ref 6.4–8.4)
PROT UR STRIP-MCNC: ABNORMAL MG/DL
RBC # BLD AUTO: 4.86 MILLION/UL (ref 3.88–5.62)
RBC #/AREA URNS AUTO: ABNORMAL /HPF
SODIUM SERPL-SCNC: 135 MMOL/L (ref 135–147)
SP GR UR STRIP.AUTO: 1.03 (ref 1–1.03)
UROBILINOGEN UR STRIP-ACNC: <2 MG/DL
WBC # BLD AUTO: 15.47 THOUSAND/UL (ref 4.31–10.16)
WBC #/AREA URNS AUTO: ABNORMAL /HPF

## 2024-04-26 PROCEDURE — 81001 URINALYSIS AUTO W/SCOPE: CPT | Performed by: EMERGENCY MEDICINE

## 2024-04-26 PROCEDURE — 85025 COMPLETE CBC W/AUTO DIFF WBC: CPT | Performed by: EMERGENCY MEDICINE

## 2024-04-26 PROCEDURE — 36415 COLL VENOUS BLD VENIPUNCTURE: CPT | Performed by: EMERGENCY MEDICINE

## 2024-04-26 PROCEDURE — 96361 HYDRATE IV INFUSION ADD-ON: CPT

## 2024-04-26 PROCEDURE — 80053 COMPREHEN METABOLIC PANEL: CPT | Performed by: EMERGENCY MEDICINE

## 2024-04-26 PROCEDURE — 99285 EMERGENCY DEPT VISIT HI MDM: CPT | Performed by: EMERGENCY MEDICINE

## 2024-04-26 PROCEDURE — 96374 THER/PROPH/DIAG INJ IV PUSH: CPT

## 2024-04-26 PROCEDURE — 74177 CT ABD & PELVIS W/CONTRAST: CPT

## 2024-04-26 PROCEDURE — 99284 EMERGENCY DEPT VISIT MOD MDM: CPT

## 2024-04-26 RX ORDER — OXYCODONE HYDROCHLORIDE AND ACETAMINOPHEN 5; 325 MG/1; MG/1
1 TABLET ORAL EVERY 4 HOURS PRN
Qty: 12 TABLET | Refills: 0 | Status: SHIPPED | OUTPATIENT
Start: 2024-04-26 | End: 2024-04-29

## 2024-04-26 RX ORDER — KETOROLAC TROMETHAMINE 10 MG/1
10 TABLET, FILM COATED ORAL EVERY 6 HOURS PRN
Qty: 8 TABLET | Refills: 0 | Status: SHIPPED | OUTPATIENT
Start: 2024-04-26 | End: 2024-04-28

## 2024-04-26 RX ORDER — KETOROLAC TROMETHAMINE 30 MG/ML
15 INJECTION, SOLUTION INTRAMUSCULAR; INTRAVENOUS ONCE
Status: COMPLETED | OUTPATIENT
Start: 2024-04-26 | End: 2024-04-26

## 2024-04-26 RX ORDER — TAMSULOSIN HYDROCHLORIDE 0.4 MG/1
0.4 CAPSULE ORAL
Qty: 3 CAPSULE | Refills: 0 | Status: SHIPPED | OUTPATIENT
Start: 2024-04-26 | End: 2024-04-29

## 2024-04-26 RX ORDER — ONDANSETRON 4 MG/1
4 TABLET, FILM COATED ORAL EVERY 6 HOURS
Qty: 12 TABLET | Refills: 0 | Status: SHIPPED | OUTPATIENT
Start: 2024-04-26

## 2024-04-26 RX ADMIN — KETOROLAC TROMETHAMINE 15 MG: 30 INJECTION, SOLUTION INTRAMUSCULAR; INTRAVENOUS at 11:53

## 2024-04-26 RX ADMIN — IOHEXOL 100 ML: 350 INJECTION, SOLUTION INTRAVENOUS at 12:54

## 2024-04-26 RX ADMIN — SODIUM CHLORIDE 1000 ML: 0.9 INJECTION, SOLUTION INTRAVENOUS at 11:51

## 2024-04-26 NOTE — DISCHARGE INSTRUCTIONS
If your symptoms do worsen over the weekend, it was recommended by our Urology team that you present to [facility] for management.     If you experience fever, chills, or any other change in symptoms - return to the ER.

## 2024-04-26 NOTE — ED PROVIDER NOTES
History  Chief Complaint   Patient presents with    Flank Pain     L flank pain, hx of kidney stone. Pain since Wednesday, not getting any better. Denies blood in urine.        Flank Pain  Associated symptoms: no chest pain, no chills, no cough, no dysuria, no fever, no hematuria, no shortness of breath, no sore throat and no vomiting        73yoM, pmhx nephrolithiasis, presenting with lt flank pain x48-72 hrs similar to previous nephrolithiasis. No change bowel, bladder. Denies fever, chills. NSAIDs at home with some relief. Wife at bedside.     Prior to Admission Medications   Prescriptions Last Dose Informant Patient Reported? Taking?   Ascorbic Acid (VITAMIN C PO)  Self Yes No   Sig: Take by mouth daily    Cholecalciferol (VITAMIN D3) 2000 units capsule  Self Yes No   Sig: Take by mouth daily    Coenzyme Q10 (CO Q 10) 100 MG CAPS  Self Yes No   Sig: Take by mouth daily    LUTEIN PO  Self Yes No   Sig: Take 2 tablets by mouth daily   Multiple Vitamin (MULTIVITAMIN) tablet  Self Yes No   Sig: Take 1 tablet by mouth daily   Omega-3 Fatty Acids (FISH OIL) 1200 MG CPDR  Self Yes No   Sig: Take by mouth daily    carbamide peroxide (DEBROX) 6.5 % otic solution   No No   Sig: Administer 5 drops to the right ear 2 (two) times a day   cyanocobalamin (VITAMIN B-12) 1,000 mcg tablet  Self Yes No   Sig: Take 3 tablets by mouth daily   docusate sodium (COLACE) 100 mg capsule   No No   Sig: Take 1 capsule (100 mg total) by mouth 2 (two) times a day for 15 days      Facility-Administered Medications: None       Past Medical History:   Diagnosis Date    BPH (benign prostatic hyperplasia)     Kidney stone     isaias       Past Surgical History:   Procedure Laterality Date    COLONOSCOPY      FL RETROGRADE PYELOGRAM  11/19/2020    LITHOTRIPSY      LYMPH NODE BIOPSY  1999    cervical, path showerd toxoplasmosis, no treatment    MO CYSTO INSERTION TRANSPROSTATIC IMPLANT SINGLE N/A 7/9/2021    Procedure: CYSTOSCOPY WITH INSERTION  UROLIFT;  Surgeon: Godfrey Curtis MD;  Location: [facility];  Service: Urology    WV CYSTO/URETERO W/LITHOTRIPSY &INDWELL STENT INSRT Left 11/19/2020    Procedure: CYSTOSCOPY URETEROSCOPY WITH LITHOTRIPSY HOLMIUM LASER, RIGHT AND LEFT RETROGRADE PYELOGRAM  AND INSERTION LEFT  STENT URETERAL;  Surgeon: Aj Guerra MD;  Location: [facility];  Service: Urology    TONSILLECTOMY         Family History   Problem Relation Age of Onset    Colon cancer Mother         dx late 60's    Migraines Mother     Cancer Mother 60        Liver or Colon cancer?    Lung cancer Father     Alcohol abuse Neg Hx     Substance Abuse Neg Hx     Mental illness Neg Hx     Depression Neg Hx      I have reviewed and agree with the history as documented.    E-Cigarette/Vaping    E-Cigarette Use Never User      E-Cigarette/Vaping Substances    Nicotine No     THC No     CBD No     Flavoring No     Other No     Unknown No      Social History     Tobacco Use    Smoking status: Never    Smokeless tobacco: Never   Vaping Use    Vaping status: Never Used   Substance Use Topics    Alcohol use: Yes     Comment: 2 x per week    Drug use: No       Review of Systems   Constitutional:  Negative for chills and fever.   HENT:  Negative for ear pain and sore throat.    Eyes:  Negative for pain and visual disturbance.   Respiratory:  Negative for cough and shortness of breath.    Cardiovascular:  Negative for chest pain and palpitations.   Gastrointestinal:  Negative for abdominal pain and vomiting.   Genitourinary:  Positive for flank pain. Negative for decreased urine volume, dysuria, frequency, hematuria and urgency.   Musculoskeletal:  Negative for arthralgias and back pain.   Skin:  Negative for color change and rash.   Neurological:  Negative for seizures and syncope.   All other systems reviewed and are negative.      Physical Exam  Physical Exam  Vitals and nursing note reviewed.   Constitutional:       General: He is not in acute  distress.     Appearance: He is well-developed.   HENT:      Head: Normocephalic and atraumatic.   Eyes:      Conjunctiva/sclera: Conjunctivae normal.   Cardiovascular:      Rate and Rhythm: Normal rate and regular rhythm.      Heart sounds: No murmur heard.  Pulmonary:      Effort: Pulmonary effort is normal. No respiratory distress.      Breath sounds: Normal breath sounds.   Abdominal:      Palpations: Abdomen is soft.      Tenderness: There is no abdominal tenderness. There is no right CVA tenderness or left CVA tenderness.   Musculoskeletal:         General: No swelling.      Cervical back: Neck supple.   Skin:     General: Skin is warm and dry.      Capillary Refill: Capillary refill takes less than 2 seconds.   Neurological:      Mental Status: He is alert.   Psychiatric:         Mood and Affect: Mood normal.         Vital Signs  ED Triage Vitals   Temperature Pulse Respirations Blood Pressure SpO2   04/26/24 1058 04/26/24 1058 04/26/24 1058 04/26/24 1058 04/26/24 1058   97.9 °F (36.6 °C) 74 20 169/100 98 %      Temp Source Heart Rate Source Patient Position - Orthostatic VS BP Location FiO2 (%)   04/26/24 1058 04/26/24 1058 04/26/24 1058 04/26/24 1058 --   Oral Monitor Lying Right arm       Pain Score       04/26/24 1153       6           Vitals:    04/26/24 1130 04/26/24 1226 04/26/24 1315 04/26/24 1522   BP: 156/91 144/87 143/90 (!) 151/101   Pulse: 72 72 69 77   Patient Position - Orthostatic VS:  Lying Lying Lying         Visual Acuity      ED Medications  Medications   ketorolac (TORADOL) injection 15 mg (15 mg Intravenous Given 4/26/24 1153)   sodium chloride 0.9 % bolus 1,000 mL (0 mL Intravenous Stopped 4/26/24 1402)   iohexol (OMNIPAQUE) 350 MG/ML injection (MULTI-DOSE) 100 mL (100 mL Intravenous Given 4/26/24 1254)       Diagnostic Studies  Results Reviewed       Procedure Component Value Units Date/Time    Urine Microscopic [[id number]]  (Abnormal) Collected: 04/26/24 1153    Lab Status: Final  result Specimen: Urine, Clean Catch Updated: 04/26/24 1243     RBC, UA 4-10 /hpf      WBC, UA 4-10 /hpf      Epithelial Cells None Seen /hpf      Bacteria, UA None Seen /hpf     Comprehensive metabolic panel [[id number]]  (Abnormal) Collected: 04/26/24 1149    Lab Status: Final result Specimen: Blood from Arm, Left Updated: 04/26/24 1216     Sodium 135 mmol/L      Potassium 4.2 mmol/L      Chloride 104 mmol/L      CO2 22 mmol/L      ANION GAP 9 mmol/L      BUN 21 mg/dL      Creatinine 1.22 mg/dL      Glucose 116 mg/dL      Calcium 9.3 mg/dL      AST 14 U/L      ALT 13 U/L      Alkaline Phosphatase 71 U/L      Total Protein 7.0 g/dL      Albumin 4.1 g/dL      Total Bilirubin 1.09 mg/dL      eGFR 59 ml/min/1.73sq m     Narrative:      National Kidney Disease Foundation guidelines for Chronic Kidney Disease (CKD):     Stage 1 with normal or high GFR (GFR > 90 mL/min/1.73 square meters)    Stage 2 Mild CKD (GFR = 60-89 mL/min/1.73 square meters)    Stage 3A Moderate CKD (GFR = 45-59 mL/min/1.73 square meters)    Stage 3B Moderate CKD (GFR = 30-44 mL/min/1.73 square meters)    Stage 4 Severe CKD (GFR = 15-29 mL/min/1.73 square meters)    Stage 5 End Stage CKD (GFR <15 mL/min/1.73 square meters)  Note: GFR calculation is accurate only with a steady state creatinine    UA w Reflex to Microscopic w Reflex to Culture [[id number]]  (Abnormal) Collected: 04/26/24 1153    Lab Status: Final result Specimen: Urine, Clean Catch Updated: 04/26/24 1203     Color, UA Yellow     Clarity, UA Clear     Specific Gravity, UA 1.029     pH, UA 5.5     Leukocytes, UA Small     Nitrite, UA Negative     Protein, UA Trace mg/dl      Glucose, UA Negative mg/dl      Ketones, UA 10 (1+) mg/dl      Urobilinogen, UA <2.0 mg/dl      Bilirubin, UA Negative     Occult Blood, UA Moderate    CBC and differential [[id number]]  (Abnormal) Collected: 04/26/24 1149    Lab Status: Final result Specimen: Blood from Arm, Left Updated: 04/26/24 1156     WBC 15.47  Thousand/uL      RBC 4.86 Million/uL      Hemoglobin 14.1 g/dL      Hematocrit 43.2 %      MCV 89 fL      MCH 29.0 pg      MCHC 32.6 g/dL      RDW 13.6 %      MPV 9.9 fL      Platelets 241 Thousands/uL      nRBC 0 /100 WBCs      Segmented % 78 %      Immature Grans % 1 %      Lymphocytes % 10 %      Monocytes % 11 %      Eosinophils Relative 0 %      Basophils Relative 0 %      Absolute Neutrophils 12.11 Thousands/µL      Absolute Immature Grans 0.08 Thousand/uL      Absolute Lymphocytes 1.50 Thousands/µL      Absolute Monocytes 1.73 Thousand/µL      Eosinophils Absolute 0.02 Thousand/µL      Basophils Absolute 0.03 Thousands/µL                    CT abdomen pelvis with contrast   Final Result by Waylon Ng MD (04/26 1348)      1.  Moderate left-sided hydroureteronephrosis secondary to a 4 mm mid left ureteral calculus. Additional punctate calculus seen just upstream as well as multiple left renal calculi measuring up to 11 mm.      2.  Diffuse hepatic steatosis.      The study was marked in EPIC for immediate notification.         Workstation performed: [id number]                    Procedures  Procedures         ED Course                               SBIRT 22yo+      Flowsheet Row Most Recent Value   Initial Alcohol Screen: US AUDIT-C     1. How often do you have a drink containing alcohol? 0 Filed at: 04/26/2024 1159   2. How many drinks containing alcohol do you have on a typical day you are drinking?  0 Filed at: 04/26/2024 1159   3a. Male UNDER 65: How often do you have five or more drinks on one occasion? 0 Filed at: 04/26/2024 1159   3b. FEMALE Any Age, or MALE 65+: How often do you have 4 or more drinks on one occassion? 0 Filed at: 04/26/2024 1159   Audit-C Score 0 Filed at: 04/26/2024 1159   FARRAH: How many times in the past year have you...    Used an illegal drug or used a prescription medication for non-medical reasons? Never Filed at: 04/26/2024 1159                      Medical Decision  Making  73yoM presenting with lt flank pain and found to have lt sided 4mm stone. Pain well controlled through out stay in ED via toradol x1. CBC with leukocytosis but pt without UTI signs/sx nor suggestion of same on UA, fever, chills, or other systemic infectious equivalents. CMP at baseline. Spoke with urology given 72hrs of sx and leukocytosis and CT findings. APC w/ attending reviewed and requested continuation of outpt mgmt. Flomax, toradol x48hrs PRN, percocet provided. Pt explained that it was also suggested he present to [facility] should his sx worsen. This was reviewed with him as well as signs/sx otherwise, including infectious, as to represent. Pt was agreeable to plan. Urology noted they would contact him Monday to establish re-eval. Reviewed all findings both relevant and incidental with the patient at bedside. Pt verbalized understanding of findings, neccesary follow up, return to ED precautions. Pt agreed to review today's findings with their primary care provider. Pt non-toxic appearing upon discharge.       Amount and/or Complexity of Data Reviewed  Independent Historian: spouse  External Data Reviewed: notes.  Labs: ordered. Decision-making details documented in ED Course.  Radiology: ordered.    Risk  OTC drugs.  Prescription drug management.  Decision regarding hospitalization.             Disposition  Final diagnoses:   Nephrolithiasis   Hydroureter   Hepatic steatosis     Time reflects when diagnosis was documented in both MDM as applicable and the Disposition within this note       Time User Action Codes Description Comment    4/26/2024  5:11 PM Cruz Fine Add [N20.0] Nephrolithiasis     4/26/2024  5:11 PM Cruz Fine Add [N13.4] Hydroureter     4/26/2024  5:12 PM Cruz Fine Add [K76.0] Hepatic steatosis           ED Disposition       ED Disposition   Discharge    Condition   Stable    Date/Time   Fri Apr 26, 2024 1711    Comment   Cody Fenton discharge to home/self care.                    Follow-up Information       Follow up With Specialties Details Why Contact Info    [facility] Pod Urology Call  For review of findings and symptoms. [address] [facility] [id number]            Discharge Medication List as of 4/26/2024  5:16 PM        START taking these medications    Details   ketorolac (TORADOL) 10 mg tablet Take 1 tablet (10 mg total) by mouth every 6 (six) hours as needed for moderate pain for up to 2 days, Starting Fri 4/26/2024, Until Sun 4/28/2024 at 2359, Normal      ondansetron (ZOFRAN) 4 mg tablet Take 1 tablet (4 mg total) by mouth every 6 (six) hours, Starting Fri 4/26/2024, Normal      oxyCODONE-acetaminophen (PERCOCET) 5-325 mg per tablet Take 1 tablet by mouth every 4 (four) hours as needed for moderate pain for up to 3 days Max Daily Amount: 6 tablets, Starting Fri 4/26/2024, Until Mon 4/29/2024 at 2359, Normal           CONTINUE these medications which have NOT CHANGED    Details   Ascorbic Acid (VITAMIN C PO) Take by mouth daily , Historical Med      carbamide peroxide (DEBROX) 6.5 % otic solution Administer 5 drops to the right ear 2 (two) times a day, Starting Fri 2/18/2022, Normal      Cholecalciferol (VITAMIN D3) 2000 units capsule Take by mouth daily , Historical Med      Coenzyme Q10 (CO Q 10) 100 MG CAPS Take by mouth daily , Historical Med      cyanocobalamin (VITAMIN B-12) 1,000 mcg tablet Take 3 tablets by mouth daily, Historical Med      docusate sodium (COLACE) 100 mg capsule Take 1 capsule (100 mg total) by mouth 2 (two) times a day for 15 days, Starting Fri 7/9/2021, Until Sat 7/24/2021, Normal      LUTEIN PO Take 2 tablets by mouth daily, Historical Med      Multiple Vitamin (MULTIVITAMIN) tablet Take 1 tablet by mouth daily, Historical Med      Omega-3 Fatty Acids (FISH OIL) 1200 MG CPDR Take by mouth daily , Historical Med             No discharge procedures on file.    PDMP Review       None            ED Provider  Electronically  Signed by             Cruz Fine,   04/26/24 1946

## 2024-04-26 NOTE — TELEPHONE ENCOUNTER
Cody Fenton is a 73-year-old male passing ureteral stone.  Please contact patient to evaluate for potential fast-track.  Thank you.

## 2024-04-29 ENCOUNTER — VBI (OUTPATIENT)
Dept: INTERNAL MEDICINE CLINIC | Facility: CLINIC | Age: 74
End: 2024-04-29

## 2024-04-29 NOTE — TELEPHONE ENCOUNTER
04/29/24 9:49 AM    Patient contacted post ED visit, [facility] department spoke with patient/caregiver and outreach was successful.    Thank you.  Nkechi Finnegan  [facility]

## 2024-04-30 DIAGNOSIS — N20.1 LEFT URETERAL CALCULUS: Primary | ICD-10-CM

## 2024-04-30 NOTE — TELEPHONE ENCOUNTER
Spoke with Cody Fenton and advised to contact CS to schedule US to confirm passage of stone. Pt states he knows he passed stone and does not think US is necessary. I advised we needed to confirm that stone was indeed passed and we would need imaging. Advised we would call with results. Pt verbalized understanding.

## 2024-07-26 ENCOUNTER — RA CDI HCC (OUTPATIENT)
Dept: OTHER | Facility: HOSPITAL | Age: 74
End: 2024-07-26

## 2024-08-01 ENCOUNTER — OFFICE VISIT (OUTPATIENT)
Dept: INTERNAL MEDICINE CLINIC | Facility: CLINIC | Age: 74
End: 2024-08-01
Payer: COMMERCIAL

## 2024-08-01 VITALS
DIASTOLIC BLOOD PRESSURE: 86 MMHG | TEMPERATURE: 98 F | WEIGHT: 195 LBS | HEART RATE: 68 BPM | HEIGHT: 72 IN | BODY MASS INDEX: 26.41 KG/M2 | SYSTOLIC BLOOD PRESSURE: 128 MMHG | OXYGEN SATURATION: 98 %

## 2024-08-01 DIAGNOSIS — Z13.6 ENCOUNTER FOR LIPID SCREENING FOR CARDIOVASCULAR DISEASE: ICD-10-CM

## 2024-08-01 DIAGNOSIS — E55.9 VITAMIN D DEFICIENCY: ICD-10-CM

## 2024-08-01 DIAGNOSIS — Z13.220 ENCOUNTER FOR LIPID SCREENING FOR CARDIOVASCULAR DISEASE: ICD-10-CM

## 2024-08-01 DIAGNOSIS — Z87.442 HISTORY OF NEPHROLITHIASIS: ICD-10-CM

## 2024-08-01 DIAGNOSIS — Z86.010 HISTORY OF COLON POLYPS: ICD-10-CM

## 2024-08-01 DIAGNOSIS — K76.0 FATTY LIVER: Primary | ICD-10-CM

## 2024-08-01 DIAGNOSIS — R39.12 BENIGN PROSTATIC HYPERPLASIA WITH WEAK URINARY STREAM: ICD-10-CM

## 2024-08-01 DIAGNOSIS — N40.1 BENIGN PROSTATIC HYPERPLASIA WITH WEAK URINARY STREAM: ICD-10-CM

## 2024-08-01 DIAGNOSIS — Z12.5 SCREENING FOR PROSTATE CANCER: ICD-10-CM

## 2024-08-01 DIAGNOSIS — Z00.00 MEDICARE ANNUAL WELLNESS VISIT, SUBSEQUENT: ICD-10-CM

## 2024-08-01 PROCEDURE — 99213 OFFICE O/P EST LOW 20 MIN: CPT | Performed by: INTERNAL MEDICINE

## 2024-08-01 PROCEDURE — G0439 PPPS, SUBSEQ VISIT: HCPCS | Performed by: INTERNAL MEDICINE

## 2024-08-01 NOTE — PROGRESS NOTES
Ambulatory Visit  Name: Cody Fenton      : 1950      MRN: [id number]  Encounter Provider: Tommy Hermosillo DO  Encounter Date: 2024   Encounter department: [facility] INTERNAL MEDICINE    Assessment & Plan   1. Fatty liver  Comments:  c/w exercising and low chol diet.  he declines to take any/all statins.  CT from 2024 shows fatty liver and no other changes  Orders:  -     Comprehensive metabolic panel; Future  -     CBC and differential  2. Benign prostatic hyperplasia with weak urinary stream  Comments:  he declines to go back and see urology  3. History of nephrolithiasis  Comments:  resolved as stone passed  4. Vitamin D deficiency  Comments:  taking vit D OTC, c/w rx  Orders:  -     Vitamin D 25 hydroxy  5. History of colon polyps  Comments:  due for colonoscopy this year, ref provided  Orders:  -     Ambulatory Referral to Colorectal Surgery; Future  6. Screening for prostate cancer  -     PSA, Total Screen  7. Encounter for lipid screening for cardiovascular disease  -     Lipid Panel with Direct LDL reflex; Future  8. Medicare annual wellness visit, subsequent      Depression Screening and Follow-up Plan: Patient was screened for depression during today's encounter. They screened negative with a PHQ-2 score of 0.      History of Present Illness     HPI    Here for follow up, Cody is overall doing well. He is taking only OTC medications on a regular basis.  He declines any/all vaccines.  He went to [facility] ER in April and was treated for a kidney stone.  The stone passed on its own.  He has weak urinary stream at times but declines to see urology(had a urolift in past).  He refuses to take statins even if his cholesterol is high.  He is exercising regularly still, with running and eating well, less cholesterol rich foods.  CT calcium score from 10/2023 was 28.  ROS Otherwise negative, no other complaints.    Review of Systems   Constitutional:  Negative for fever.   HENT:  Negative  for congestion.    Eyes:  Negative for visual disturbance.   Respiratory:  Negative for shortness of breath.    Cardiovascular:  Negative for chest pain.   Gastrointestinal:  Negative for abdominal pain.   Endocrine: Negative for polyuria.   Genitourinary:  Negative for difficulty urinating.   Musculoskeletal:  Negative for gait problem.   Skin:  Negative for rash.   Allergic/Immunologic: Negative for immunocompromised state.   Neurological:  Negative for dizziness.   Psychiatric/Behavioral:  Negative for dysphoric mood.      Current Outpatient Medications on File Prior to Visit   Medication Sig Dispense Refill    Ascorbic Acid (VITAMIN C PO) Take by mouth daily       carbamide peroxide (DEBROX) 6.5 % otic solution Administer 5 drops to the right ear 2 (two) times a day 15 mL 0    Cholecalciferol (VITAMIN D3) 2000 units capsule Take by mouth daily       Coenzyme Q10 (CO Q 10) 100 MG CAPS Take by mouth daily       cyanocobalamin (VITAMIN B-12) 1,000 mcg tablet Take 3 tablets by mouth daily      LUTEIN PO Take 2 tablets by mouth daily      Multiple Vitamin (MULTIVITAMIN) tablet Take 1 tablet by mouth daily      Omega-3 Fatty Acids (FISH OIL) 1200 MG CPDR Take by mouth daily       [DISCONTINUED] ketorolac (TORADOL) 10 mg tablet Take 1 tablet (10 mg total) by mouth every 6 (six) hours as needed for moderate pain for up to 2 days 8 tablet 0    [DISCONTINUED] ondansetron (ZOFRAN) 4 mg tablet Take 1 tablet (4 mg total) by mouth every 6 (six) hours 12 tablet 0    [DISCONTINUED] docusate sodium (COLACE) 100 mg capsule Take 1 capsule (100 mg total) by mouth 2 (two) times a day for 15 days 30 capsule 0    [DISCONTINUED] tamsulosin (FLOMAX) 0.4 mg Take 1 capsule (0.4 mg total) by mouth daily with dinner for 3 days 3 capsule 0     No current facility-administered medications on file prior to visit.      Objective     /86 (BP Location: Left arm, Patient Position: Sitting, Cuff Size: Standard)   Pulse 68   Temp 98 °F (36.7  °C)   Ht 6' (1.829 m)   Wt 88.5 kg (195 lb)   SpO2 98%   BMI 26.45 kg/m²     Physical Exam  Vitals reviewed.   Constitutional:       General: He is not in acute distress.     Appearance: Normal appearance.   HENT:      Head: Normocephalic and atraumatic.      Right Ear: Tympanic membrane normal.      Left Ear: Tympanic membrane normal.      Mouth/Throat:      Mouth: Mucous membranes are moist.   Eyes:      Conjunctiva/sclera: Conjunctivae normal.   Cardiovascular:      Rate and Rhythm: Normal rate and regular rhythm.      Heart sounds: No murmur heard.  Pulmonary:      Effort: Pulmonary effort is normal.      Breath sounds: No wheezing or rales.   Abdominal:      General: Abdomen is flat. Bowel sounds are normal.      Palpations: Abdomen is soft.      Tenderness: There is no abdominal tenderness.   Musculoskeletal:      Cervical back: Neck supple.      Right lower leg: No edema.      Left lower leg: No edema.   Neurological:      Mental Status: He is alert. Mental status is at baseline.   Psychiatric:         Mood and Affect: Mood normal.         Behavior: Behavior normal.       Lab Results   Component Value Date    PSA 1.26 10/05/2023    PSA 1.2 07/09/2020    PSA 1.2 07/05/2019         Administrative Statements

## 2024-08-01 NOTE — PATIENT INSTRUCTIONS
Zetia may be option    Patient Education     Ezetimibe (ez ET i mibe)   Brand Names: US Zetia   Brand Names: Pat ACH-Ezetimibe; AG-Ezetimibe; APO-Ezetimibe; AURO-Ezetimibe; BIO-Ezetimibe; Ezetrol; GLN-Ezetimibe; JAMP-Ezetimibe; M-Ezetimibe; Mar-Ezetimibe; MINT-Ezetimibe; NRA-Ezetimibe; PMS-Ezetimibe; Priva-Ezetimibe [DSC]; RAN-Ezetimibe; SANDOZ Ezetimibe; TEVA-Ezetimibe   What is this drug used for?   It is used to lower cholesterol.  This drug may be used with other drugs to treat your health condition. If you are also taking other drugs, talk with your doctor about the risks and side effects that may happen.  What do I need to tell my doctor BEFORE I take this drug?   If you are allergic to this drug; any part of this drug; or any other drugs, foods, or substances. Tell your doctor about the allergy and what signs you had.  If you have liver disease or raised liver enzymes.  This is not a list of all drugs or health problems that interact with this drug.  Tell your doctor and pharmacist about all of your drugs (prescription or OTC, natural products, vitamins) and health problems. You must check to make sure that it is safe for you to take this drug with all of your drugs and health problems. Do not start, stop, or change the dose of any drug without checking with your doctor.  What are some things I need to know or do while I take this drug?   Tell all of your health care providers that you take this drug. This includes your doctors, nurses, pharmacists, and dentists.  Have your blood work and other lab tests checked as you have been told by your doctor.  Follow the diet and workout plan that your doctor told you about.  Do not take colestipol or cholestyramine within 4 hours before or 2 hours after this drug.  Tell your doctor if you are pregnant, plan on getting pregnant, or are breast-feeding. You will need to talk about the benefits and risks to you and the baby.  What are some side effects that I need to  call my doctor about right away?   WARNING/CAUTION: Even though it may be rare, some people may have very bad and sometimes deadly side effects when taking a drug. Tell your doctor or get medical help right away if you have any of the following signs or symptoms that may be related to a very bad side effect:  Signs of an allergic reaction, like rash; hives; itching; red, swollen, blistered, or peeling skin with or without fever; wheezing; tightness in the chest or throat; trouble breathing, swallowing, or talking; unusual hoarseness; or swelling of the mouth, face, lips, tongue, or throat.  Dizziness or passing out.  A fast heartbeat.  Flu-like signs. These may include fever, sore throat, cough, tiredness, and joint pain.  Dark urine or yellow skin or eyes.  This drug may cause muscle pain, tenderness, or weakness. Sometimes, a severe muscle problem (rhabdomyolysis) has happened. Most of the time, this happened in people who were taking this drug along with other drugs that raise the risk of muscle problems. Call your doctor right away if you have abnormal muscle pain, tenderness, or weakness (with or without fever or feeling out of sorts). Call your doctor right away if muscle problems last after your doctor has told you to stop taking this drug.  What are some other side effects of this drug?   All drugs may cause side effects. However, many people have no side effects or only have minor side effects. Call your doctor or get medical help if any of these side effects or any other side effects bother you or do not go away:  Joint pain.  Back pain.  Pain in arms or legs.  Diarrhea.  Feeling tired or weak.  Nose or throat irritation.  Signs of a common cold.  These are not all of the side effects that may occur. If you have questions about side effects, call your doctor. Call your doctor for medical advice about side effects.  You may report side effects to your national health agency.  You may report side effects to  the FDA at 1-274.923.4668. You may also report side effects at https://www.fda.gov/medwatch.  How is this drug best taken?   Use this drug as ordered by your doctor. Read all information given to you. Follow all instructions closely.  Take with or without food.  Take this drug at the same time of day.  Keep taking this drug as you have been told by your doctor or other health care provider, even if you feel well.  What do I do if I miss a dose?   Take a missed dose as soon as you think about it.  If it is close to the time for your next dose, skip the missed dose and go back to your normal time.  Do not take 2 doses at the same time or extra doses.  How do I store and/or throw out this drug?   Store at room temperature in a dry place. Do not store in a bathroom.  Keep all drugs in a safe place. Keep all drugs out of the reach of children and pets.  Throw away unused or  drugs. Do not flush down a toilet or pour down a drain unless you are told to do so. Check with your pharmacist if you have questions about the best way to throw out drugs. There may be drug take-back programs in your area.  General drug facts   If your symptoms or health problems do not get better or if they become worse, call your doctor.  Do not share your drugs with others and do not take anyone else's drugs.  Some drugs may have another patient information leaflet. If you have any questions about this drug, please talk with your doctor, nurse, pharmacist, or other health care provider.  Some drugs may have another patient information leaflet. Check with your pharmacist. If you have any questions about this drug, please talk with your doctor, nurse, pharmacist, or other health care provider.  If you think there has been an overdose, call your poison control center or get medical care right away. Be ready to tell or show what was taken, how much, and when it happened.  Consumer Information Use and Disclaimer   This generalized information  is a limited summary of diagnosis, treatment, and/or medication information. It is not meant to be comprehensive and should be used as a tool to help the user understand and/or assess potential diagnostic and treatment options. It does NOT include all information about conditions, treatments, medications, side effects, or risks that may apply to a specific patient. It is not intended to be medical advice or a substitute for the medical advice, diagnosis, or treatment of a health care provider based on the health care provider's examination and assessment of a patient's specific and unique circumstances. Patients must speak with a health care provider for complete information about their health, medical questions, and treatment options, including any risks or benefits regarding use of medications. This information does not endorse any treatments or medications as safe, effective, or approved for treating a specific patient. Up[employer] and its affiliates disclaim any warranty or liability relating to this information or the use thereof. The use of this information is governed by the Terms of Use, available at https://www.wolterskluwer.com/en/know/clinical-effectiveness-terms.  Last Reviewed Date   2024-03-18  Copyright   © 2024 [employer] and its affiliates and/or licensors. All rights reserved.

## 2024-08-01 NOTE — PROGRESS NOTES
Cody is here for his Subsequent Wellness visit.     Health Risk Assessment:   Patient rates overall health as good. Patient feels that their physical health rating is same. Patient is satisfied with their life. Eyesight was rated as same. Hearing was rated as same. Patient feels that their emotional and mental health rating is same. Patients states they are never, rarely angry. Patient states they are sometimes unusually tired/fatigued. Pain experienced in the last 7 days has been none. Patient states that he has experienced no weight loss or gain in last 6 months.     Depression Screening:   PHQ-2 Score: 0      Fall Risk Screening:   In the past year, patient has experienced: no history of falling in past year      Home Safety:  Patient does not have trouble with stairs inside or outside of their home. Patient has working smoke alarms and has working carbon monoxide detector. Home safety hazards include: none.     Nutrition:   Current diet is Regular.     Medications:   Patient is not currently taking any over-the-counter supplements. Patient is able to manage medications.     Activities of Daily Living (ADLs)/Instrumental Activities of Daily Living (IADLs):   Walk and transfer into and out of bed and chair?: Yes  Dress and groom yourself?: Yes    Bathe or shower yourself?: Yes    Feed yourself? Yes  Do your laundry/housekeeping?: Yes  Manage your money, pay your bills and track your expenses?: Yes  Make your own meals?: Yes    Do your own shopping?: Yes    Previous Hospitalizations:   Any hospitalizations or ED visits within the last 12 months?: Yes    How many hospitalizations have you had in the last year?: 1-2    Advance Care Planning:   Living will: Yes    Durable POA for healthcare: Yes    Advanced directive: Yes      PREVENTIVE SCREENINGS      Cardiovascular Screening:    General: Screening Current      Diabetes Screening:     General: Screening Current      Colorectal Cancer Screening:     General:  Screening Current      Prostate Cancer Screening:    General: Screening Current      Osteoporosis Screening:    General: Screening Not Indicated      Abdominal Aortic Aneurysm (AAA) Screening:    Risk factors include: age between 65-74 yo        General: Screening Not Indicated      Lung Cancer Screening:     General: Screening Not Indicated      Hepatitis C Screening:    General: Screening Current    Screening, Brief Intervention, and Referral to Treatment (SBIRT)    Screening  Typical number of drinks in a day: 0  Typical number of drinks in a week: 2  Interpretation: Low risk drinking behavior.    AUDIT-C Screenin) How often did you have a drink containing alcohol in the past year? monthly or less  2) How many drinks did you have on a typical day when you were drinking in the past year? 0  3) How often did you have 6 or more drinks on one occasion in the past year? never    AUDIT-C Score: 1  Interpretation: Score 0-3 (male): Negative screen for alcohol misuse    Single Item Drug Screening:  How often have you used an illegal drug (including marijuana) or a prescription medication for non-medical reasons in the past year? never    Single Item Drug Screen Score: 0  Interpretation: Negative screen for possible drug use disorder    Other Counseling Topics:   Regular weightbearing exercise.

## 2024-08-20 ENCOUNTER — TELEPHONE (OUTPATIENT)
Age: 74
End: 2024-08-20

## 2024-08-20 ENCOUNTER — PREP FOR PROCEDURE (OUTPATIENT)
Age: 74
End: 2024-08-20

## 2024-08-20 DIAGNOSIS — Z86.010 HISTORY OF COLON POLYPS: Primary | ICD-10-CM

## 2024-08-20 NOTE — TELEPHONE ENCOUNTER
24  Screened by: Tamara Hook MA    Referring Provider Juan Jose    Pre- Screenin.45  Has patient been referred for a routine screening Colonoscopy? yes  Is the patient between 45-75 years old? yes      Previous Colonoscopy yes   If yes:    Date: 24    Facility:     Reason:       Does the patient want to see a Gastroenterologist prior to their procedure OR are they having any GI symptoms? no    Has the patient been hospitalized or had abdominal surgery in the past 6 months? no    Does the patient use supplemental oxygen? no    Does the patient take Coumadin, Lovenox, Plavix, Elliquis, Xarelto, or other blood thinning medication? no    Has the patient had a stroke, cardiac event, or stent placed in the past year? no

## 2024-08-20 NOTE — LETTER
Attached are your prep instructions for your upcoming procedure on 1/8/25. If you have any questions or concerns please contact us at [phone].    Thank you,     [facility]        COLONOSCOPY  MIRALAX/Dulcolax Bowel Preparation Instructions    The OR/GI Lab will contact you the evening prior to your procedure with your exact arrival time.    Our practice requires a 1 week notice for any cancellations or rescheduling. We kindly ask that you immediately notify us of any changes including any new medications that are prescribed. Thank you for your cooperation.     WEEK BEFORE YOUR PROCEDURE:  Stop taking Iron tablets.  5 days prior, AVOID vegetables and fruits with skins or seeds, nuts, corn, popcorn and whole grain breads.   Purchase: One (1) 238-gram container of Miralax (polyethylene glycol 3350), four (4) 5 mg Dulcolax (bisacodyl) tablets, and one (1) 64-ounce bottle of Gatorade (sports drink) - no red, orange, or purple. These may be purchased at any pharmacy without a prescription. Generic products are permissible.   Arrange responsible transportation for day of the procedure.     DAY BEFORE THE PROCEDURE:   CLEAR liquids only for entire day prior. Nothing red, orange or purple.    You MAY have:                                                               Soda  Water  Broth Gatorade  Jello  Popsicles Coffee/tea without milk/creamer     YOU MAY NOT HAVE:  Solid foods   Milk and milk products    Juice with pulp    BOWEL PREPARATION:  Includes: One (1) 238-gram container of Miralax (polyethylene glycol 3350), four (4) 5 mg Dulcolax (bisacodyl) tablets, and one (1) 64-ounce bottle of Gatorade (sports drink).  Preparation may be refrigerated.  Entire bowel prep should be completed.     Afternoon before the procedure (2:00 pm - 5:00 pm):    Take two (2) 5 mg Dulcolax laxative tablets.     Evening before the procedure (6:00 pm):  Mix entire container of  Miralax with one (1) 64-ounce bottle of Gatorade and shake until all medication is dissolved.   Begin drinking solution. Drink an eight (8) ounce cup every 10-15 minutes until you have consumed half (32 ounces) of the solution.  Refrigerate remaining solution.    Night before the procedure (8:00 pm):  Take two (2) 5 mg Dulcolax laxative tablets.     Beginning 5 hours before your procedure:  Drink the remaining amount of prepared solution (32 ounces).  Drink an eight (8) ounce cup every 10-15 minutes until you have consumed the remaining solution.     Bowel prep should be completed 4 hours prior to procedure time.    NOTHING TO EAT OR DRINK AFTER MIDNIGHT- EXCEPT FOR YOUR PREP    DAY OF THE PROCEDURE:  You may brush your teeth.  Leave all jewelry at home.  Please arrive for your procedure as indicated by the OR / GI Lab / Endoscopy Unit. The hospital will contact you the day before with your exact arrival time.   Make sure you have arranged ahead of time for a responsible adult (18 or older) to accompany and drive you home after the procedure.  Please discuss any transportation concerns with our staff prior to your procedure.    The effects of the anesthesia can persist for 24 hours.  After receiving the sedation, you must exercise caution before engaging in any activity that could harm yourself and others (such as driving a car).  Do not make any important decisions or do not drink any alcoholic beverages during this time period.  After your procedure, you may have anything you'd like to eat or drink.  You will probably want to start with something light.  Please include plenty of fluids.  Avoid items that cause gas such as sodas and salads.    SPECIAL INSTRUCTIONS:    For patients currently taking blood thinners and/or antiplatelet therapy our office will contact the prescribing provider.  Our office will contact you with any required changes to your medication regimen.     Blood thinner (i.e. - Coumadin, Pradaxa,  Lovenox, Xarelto, Eliquis)  ?  Continue (Do Not Stop)  ? Stop______________for_____________days prior to the procedure.    Antiplatelet (i.e. - Plavix, Aggrenox, Effient, Brilinta)  ?  Continue (Do Not Stop)  ? Stop______________for_____________days prior to the procedure.       Diabetes:   If you are Diabetic, please see separate Diabetic Instruction Sheet.          Prescribed medications:  Do not stop your aspirin, or any of your other medications (unless instructed otherwise).    Take the rest of your prescribed medications with small sips of water at least 2 hours prior to your procedure.      For any questions or concerns related to your bowel preparation or pre-procedure instructions, please contact our office at [phone].  Thank you for choosing [facility]!Medicine Instructions for Adults with Diabetes who Need a Bowel Prep       Follow these instructions when a BOWEL PREP is required for your procedure or surgery!    NOTE:   GLP-1 Agonists taken weekly: do not take in the 7 days before your procedure   SGLT-2 Inhibitors: do not take in the 4 days before your procedure     On the Day Before Surgery/Procedure  If you are having a procedure (e.g. Colonoscopy) or surgery that requires a bowel prep and you may have at least a clear liquid diet, follow the directions below based on the type of medicine you take for your diabetes.     Type of Medicine You Take Examples What to do   Pre-Mixed Insulin - Intermediate Acting Humalog® 75/25, Humulin® 70/30, Novolog® 70/30, Regular Insulin Take ½ your regular dose the evening before your procedure   Rapid/Fast Acting Insulin Humalog® U200, NovoLog®, Apidra®, Fiasp® Take ½ your regular dose the evening before your procedure.   Long-Acting Insulin Lantus®, Levemir®, Tresiba®, Toujeo®, Basaglar® Take your FULL regular dose the day before procedure   Oral Sulfonylurea Glipizide/Glimepiride/Glucotrol® Take ½ your regular dose the evening before your  procedure   Other Oral Diabetes Medicines Metformin®, Glucophage®, Glucophage XR®, Riomet®, Glumetza®), Actose®, Avandia®, Glyset®, Prandin® Take your regular dose with dinner in the evening before your procedure   GLP-1 Agonists AdlyxinÒ, ByettaÒ, BydureonÒ, OzempicÒ, SoliquaÒ, TanzeumÒ, TrulicityÒ, VictozaÒ, Saxenda®, Rybelsus® If taken daily, take as normal    If taken weekly, do not take this medicine for 7 days before your procedure including the day of the procedure (resume taking after the procedure)   SGLT-2 Inhibitors Jardiance®, Invokana®, Farxiga®,   Steglatro®, Brenzavvy®, Qtern®, Segluromet®, Glyxambi®, Synjardy®, Synjardy XR®, Invokamet®, Invokamet XR®, Trijary XR®, Xigduo XR®, Steglujan® Do not take for 4 days before your procedure including the day of the procedure (resume taking after the procedure)                More information continued on back                    Medicine Instructions for Adults with Diabetes who Need a Bowel Prep  Page 2      On the Day of Surgery/Procedure  Follow the directions below based on the type of medicine you take for your diabetes.     Type of Medicine You Take Examples What to do   Long-Acting Insulin Lantus®, Levemir®, Tresiba®, Toujeo®, Basaglar®, Semglee®   If you usually take your Long-Acting Insulin in the morning, take the full dose as scheduled.   GLP-1 Agonists AdlyxinÒ, ByettaÒ, BydureonÒ, OzempicÒ, SoliquaÒ, TanzeumÒ, TrulicityÒ, VictozaÒ, Saxenda®, Rybelsus® Do NOT take this medicine on the day of your procedure (resume taking after the procedure)       On the Day of Surgery/Procedure (continued)  Except for the morning Long-Acting Insulin, DO NOT take ANY diabetic medicine on the day of your procedure unless you were instructed by the doctor who manages your diabetes medicines.    Continue to check your blood sugars.  If you have an insulin pump, ask your endocrinologist for instructions at least 3 days before your procedure. NOTE: If you are not able to  ask your endocrinologist in advance, on the day of the procedure set your insulin pump to your basal rate only. Bring your insulin pump supplies to the hospital.     If you have any questions about taking your diabetes medicines prior to your procedure, please contact the doctor who manages your diabetes medicines.

## 2024-08-20 NOTE — TELEPHONE ENCOUNTER
Scheduled date of colonoscopy (as of today): 1/8/25    Physician performing colonoscopy:  Juan Jose    [facility] of colonoscopy:  [facility]    Bowel prep reviewed with patient:  MADELAINE/ZULLY    Instructions reviewed with patient by: maile    Clearances: na    5 yr recall  Chuy

## 2024-09-21 ENCOUNTER — HOSPITAL ENCOUNTER (INPATIENT)
Facility: HOSPITAL | Age: 74
LOS: 3 days | Discharge: HOME | DRG: 138 | End: 2024-09-24
Attending: EMERGENCY MEDICINE | Admitting: INTERNAL MEDICINE
Payer: COMMERCIAL

## 2024-09-21 ENCOUNTER — OFFICE VISIT (OUTPATIENT)
Dept: URGENT CARE | Age: 74
End: 2024-09-21
Payer: COMMERCIAL

## 2024-09-21 ENCOUNTER — APPOINTMENT (EMERGENCY)
Dept: CT IMAGING | Facility: HOSPITAL | Age: 74
DRG: 138 | End: 2024-09-21
Payer: COMMERCIAL

## 2024-09-21 VITALS
DIASTOLIC BLOOD PRESSURE: 88 MMHG | HEART RATE: 71 BPM | TEMPERATURE: 98 F | RESPIRATION RATE: 16 BRPM | OXYGEN SATURATION: 99 % | SYSTOLIC BLOOD PRESSURE: 159 MMHG

## 2024-09-21 DIAGNOSIS — K12.2 ABSCESS OF MASTICATOR SPACE OF MOUTH: Primary | ICD-10-CM

## 2024-09-21 DIAGNOSIS — R22.1 NECK SWELLING: ICD-10-CM

## 2024-09-21 DIAGNOSIS — M27.2 MANDIBULAR ABSCESS: ICD-10-CM

## 2024-09-21 DIAGNOSIS — R51.9 ACUTE INTRACTABLE HEADACHE, UNSPECIFIED HEADACHE TYPE: Primary | ICD-10-CM

## 2024-09-21 LAB
ALBUMIN SERPL BCG-MCNC: 4.1 G/DL (ref 3.5–5)
ALP SERPL-CCNC: 71 U/L (ref 34–104)
ALT SERPL W P-5'-P-CCNC: 13 U/L (ref 7–52)
ANION GAP SERPL CALCULATED.3IONS-SCNC: 8 MMOL/L (ref 4–13)
AST SERPL W P-5'-P-CCNC: 15 U/L (ref 13–39)
BASOPHILS # BLD AUTO: 0.04 THOUSANDS/ΜL (ref 0–0.1)
BASOPHILS NFR BLD AUTO: 0 % (ref 0–1)
BILIRUB SERPL-MCNC: 1.14 MG/DL (ref 0.2–1)
BUN SERPL-MCNC: 14 MG/DL (ref 5–25)
CALCIUM SERPL-MCNC: 9.2 MG/DL (ref 8.4–10.2)
CHLORIDE SERPL-SCNC: 107 MMOL/L (ref 96–108)
CO2 SERPL-SCNC: 23 MMOL/L (ref 21–32)
CREAT SERPL-MCNC: 0.87 MG/DL (ref 0.6–1.3)
EOSINOPHIL # BLD AUTO: 0 THOUSAND/ΜL (ref 0–0.61)
EOSINOPHIL NFR BLD AUTO: 0 % (ref 0–6)
ERYTHROCYTE [DISTWIDTH] IN BLOOD BY AUTOMATED COUNT: 13.4 % (ref 11.6–15.1)
GFR SERPL CREATININE-BSD FRML MDRD: 86 ML/MIN/1.73SQ M
GLUCOSE SERPL-MCNC: 118 MG/DL (ref 65–140)
HCT VFR BLD AUTO: 41.5 % (ref 36.5–49.3)
HGB BLD-MCNC: 14 G/DL (ref 12–17)
IMM GRANULOCYTES # BLD AUTO: 0.06 THOUSAND/UL (ref 0–0.2)
IMM GRANULOCYTES NFR BLD AUTO: 0 % (ref 0–2)
LYMPHOCYTES # BLD AUTO: 1.01 THOUSANDS/ΜL (ref 0.6–4.47)
LYMPHOCYTES NFR BLD AUTO: 7 % (ref 14–44)
MCH RBC QN AUTO: 29.8 PG (ref 26.8–34.3)
MCHC RBC AUTO-ENTMCNC: 33.7 G/DL (ref 31.4–37.4)
MCV RBC AUTO: 88 FL (ref 82–98)
MONOCYTES # BLD AUTO: 1.32 THOUSAND/ΜL (ref 0.17–1.22)
MONOCYTES NFR BLD AUTO: 9 % (ref 4–12)
NEUTROPHILS # BLD AUTO: 12.37 THOUSANDS/ΜL (ref 1.85–7.62)
NEUTS SEG NFR BLD AUTO: 84 % (ref 43–75)
NRBC BLD AUTO-RTO: 0 /100 WBCS
PLATELET # BLD AUTO: 216 THOUSANDS/UL (ref 149–390)
PLATELET # BLD AUTO: 240 THOUSANDS/UL (ref 149–390)
PMV BLD AUTO: 10 FL (ref 8.9–12.7)
PMV BLD AUTO: 10.1 FL (ref 8.9–12.7)
POTASSIUM SERPL-SCNC: 3.9 MMOL/L (ref 3.5–5.3)
PROCALCITONIN SERPL-MCNC: <0.05 NG/ML
PROT SERPL-MCNC: 7.1 G/DL (ref 6.4–8.4)
RBC # BLD AUTO: 4.7 MILLION/UL (ref 3.88–5.62)
SODIUM SERPL-SCNC: 138 MMOL/L (ref 135–147)
WBC # BLD AUTO: 14.8 THOUSAND/UL (ref 4.31–10.16)

## 2024-09-21 PROCEDURE — 96372 THER/PROPH/DIAG INJ SC/IM: CPT | Performed by: STUDENT IN AN ORGANIZED HEALTH CARE EDUCATION/TRAINING PROGRAM

## 2024-09-21 PROCEDURE — 85025 COMPLETE CBC W/AUTO DIFF WBC: CPT

## 2024-09-21 PROCEDURE — 80053 COMPREHEN METABOLIC PANEL: CPT

## 2024-09-21 PROCEDURE — 99214 OFFICE O/P EST MOD 30 MIN: CPT | Performed by: STUDENT IN AN ORGANIZED HEALTH CARE EDUCATION/TRAINING PROGRAM

## 2024-09-21 PROCEDURE — 99221 1ST HOSP IP/OBS SF/LOW 40: CPT | Performed by: OTOLARYNGOLOGY

## 2024-09-21 PROCEDURE — NC001 PR NO CHARGE: Performed by: INTERNAL MEDICINE

## 2024-09-21 PROCEDURE — 70491 CT SOFT TISSUE NECK W/DYE: CPT

## 2024-09-21 PROCEDURE — 99204 OFFICE O/P NEW MOD 45 MIN: CPT | Performed by: DENTIST

## 2024-09-21 PROCEDURE — 36415 COLL VENOUS BLD VENIPUNCTURE: CPT

## 2024-09-21 PROCEDURE — 85049 AUTOMATED PLATELET COUNT: CPT

## 2024-09-21 PROCEDURE — 99284 EMERGENCY DEPT VISIT MOD MDM: CPT

## 2024-09-21 PROCEDURE — 99285 EMERGENCY DEPT VISIT HI MDM: CPT | Performed by: EMERGENCY MEDICINE

## 2024-09-21 PROCEDURE — 96375 TX/PRO/DX INJ NEW DRUG ADDON: CPT

## 2024-09-21 PROCEDURE — 84145 PROCALCITONIN (PCT): CPT

## 2024-09-21 PROCEDURE — 96361 HYDRATE IV INFUSION ADD-ON: CPT

## 2024-09-21 PROCEDURE — 70450 CT HEAD/BRAIN W/O DYE: CPT

## 2024-09-21 PROCEDURE — 96365 THER/PROPH/DIAG IV INF INIT: CPT

## 2024-09-21 PROCEDURE — 31575 DIAGNOSTIC LARYNGOSCOPY: CPT | Performed by: OTOLARYNGOLOGY

## 2024-09-21 RX ORDER — DIPHENHYDRAMINE HYDROCHLORIDE 50 MG/ML
25 INJECTION INTRAMUSCULAR; INTRAVENOUS ONCE
Status: COMPLETED | OUTPATIENT
Start: 2024-09-21 | End: 2024-09-21

## 2024-09-21 RX ORDER — CHLORHEXIDINE GLUCONATE ORAL RINSE 1.2 MG/ML
15 SOLUTION DENTAL EVERY 12 HOURS SCHEDULED
Status: DISCONTINUED | OUTPATIENT
Start: 2024-09-21 | End: 2024-09-24 | Stop reason: HOSPADM

## 2024-09-21 RX ORDER — ACETAMINOPHEN 10 MG/ML
1000 INJECTION, SOLUTION INTRAVENOUS ONCE
Status: COMPLETED | OUTPATIENT
Start: 2024-09-21 | End: 2024-09-21

## 2024-09-21 RX ORDER — ENOXAPARIN SODIUM 100 MG/ML
40 INJECTION SUBCUTANEOUS DAILY
Status: DISCONTINUED | OUTPATIENT
Start: 2024-09-22 | End: 2024-09-24 | Stop reason: HOSPADM

## 2024-09-21 RX ORDER — METOCLOPRAMIDE HYDROCHLORIDE 5 MG/ML
10 INJECTION INTRAMUSCULAR; INTRAVENOUS ONCE
Status: COMPLETED | OUTPATIENT
Start: 2024-09-21 | End: 2024-09-21

## 2024-09-21 RX ORDER — SODIUM CHLORIDE 9 MG/ML
150 INJECTION, SOLUTION INTRAVENOUS CONTINUOUS
Status: DISCONTINUED | OUTPATIENT
Start: 2024-09-21 | End: 2024-09-21

## 2024-09-21 RX ORDER — DEXAMETHASONE SODIUM PHOSPHATE 10 MG/ML
10 INJECTION, SOLUTION INTRAMUSCULAR; INTRAVENOUS ONCE
Status: COMPLETED | OUTPATIENT
Start: 2024-09-21 | End: 2024-09-21

## 2024-09-21 RX ORDER — KETOROLAC TROMETHAMINE 30 MG/ML
30 INJECTION, SOLUTION INTRAMUSCULAR; INTRAVENOUS ONCE
Status: DISCONTINUED | OUTPATIENT
Start: 2024-09-21 | End: 2024-09-21

## 2024-09-21 RX ORDER — PANTOPRAZOLE SODIUM 40 MG/10ML
40 INJECTION, POWDER, LYOPHILIZED, FOR SOLUTION INTRAVENOUS ONCE
Status: COMPLETED | OUTPATIENT
Start: 2024-09-21 | End: 2024-09-21

## 2024-09-21 RX ORDER — DEXAMETHASONE SODIUM PHOSPHATE 4 MG/ML
4 INJECTION, SOLUTION INTRA-ARTICULAR; INTRALESIONAL; INTRAMUSCULAR; INTRAVENOUS; SOFT TISSUE DAILY
Status: DISCONTINUED | OUTPATIENT
Start: 2024-09-22 | End: 2024-09-23

## 2024-09-21 RX ORDER — KETOROLAC TROMETHAMINE 30 MG/ML
15 INJECTION, SOLUTION INTRAMUSCULAR; INTRAVENOUS EVERY 6 HOURS SCHEDULED
Status: DISCONTINUED | OUTPATIENT
Start: 2024-09-21 | End: 2024-09-22

## 2024-09-21 RX ADMIN — KETOROLAC TROMETHAMINE 15 MG: 30 INJECTION, SOLUTION INTRAMUSCULAR; INTRAVENOUS at 18:09

## 2024-09-21 RX ADMIN — CHLORHEXIDINE GLUCONATE 15 ML: 1.2 RINSE ORAL at 21:42

## 2024-09-21 RX ADMIN — KETOROLAC TROMETHAMINE 30 MG: 30 INJECTION, SOLUTION INTRAMUSCULAR; INTRAVENOUS at 09:11

## 2024-09-21 RX ADMIN — SODIUM CHLORIDE 1000 ML: 0.9 INJECTION, SOLUTION INTRAVENOUS at 10:59

## 2024-09-21 RX ADMIN — IOHEXOL 85 ML: 350 INJECTION, SOLUTION INTRAVENOUS at 11:41

## 2024-09-21 RX ADMIN — AMPICILLIN AND SULBACTAM 3 G: 10; 5 INJECTION, POWDER, FOR SOLUTION INTRAVENOUS at 12:43

## 2024-09-21 RX ADMIN — DEXAMETHASONE SODIUM PHOSPHATE 10 MG: 10 INJECTION INTRAMUSCULAR; INTRAVENOUS at 11:00

## 2024-09-21 RX ADMIN — ACETAMINOPHEN 1000 MG: 10 INJECTION INTRAVENOUS at 11:00

## 2024-09-21 RX ADMIN — SODIUM CHLORIDE 150 ML/HR: 0.9 INJECTION, SOLUTION INTRAVENOUS at 14:39

## 2024-09-21 RX ADMIN — PANTOPRAZOLE SODIUM 40 MG: 40 INJECTION, POWDER, FOR SOLUTION INTRAVENOUS at 19:15

## 2024-09-21 RX ADMIN — DIPHENHYDRAMINE HYDROCHLORIDE 25 MG: 50 INJECTION, SOLUTION INTRAMUSCULAR; INTRAVENOUS at 11:00

## 2024-09-21 RX ADMIN — AMPICILLIN AND SULBACTAM 3 G: 10; 5 INJECTION, POWDER, FOR SOLUTION INTRAVENOUS at 18:11

## 2024-09-21 RX ADMIN — METOCLOPRAMIDE 10 MG: 5 INJECTION, SOLUTION INTRAMUSCULAR; INTRAVENOUS at 11:00

## 2024-09-21 NOTE — ED PROVIDER NOTES
1. Abscess of  space of mouth      ED Disposition       ED Disposition   Admit    Condition   Stable    Date/Time   Sat Sep 21, 2024  2:56 PM    Comment   Case was discussed with Critical care and the patient's admission status was agreed to be Admission Status: inpatient status to the service of Dr. Garay .               Assessment & Plan       Medical Decision Making  CT head ordered given the Pt's HA was atypical from his usual HA. No intracranial pathology. Pt's presentation is concerning for deep space neck abscess. PTA considered given clinical findings, however, Pt does not have tonsils. NO evidence of Terry's angina. NO evidence of meningitis on exam. NO dental findings. CT soft tissue neck shows  abscess with distal extension. NO imminent airway compromise, however, there are concerns. Therefore, Critical care was consulted who agreed to take the patient under SD1. OMS and ENT was consulted. OMS stated that given the location of the abscess that ENT may be appropriate for management. ENT stated they would evaluate the Pt at bedside. Ultimately, Pt was admitted to SD1 for airway watch. Decadron and IV abs started.    Amount and/or Complexity of Data Reviewed  Labs: ordered.  Radiology: ordered. Decision-making details documented in ED Course.    Risk  Prescription drug management.  Decision regarding hospitalization.                ED Course as of 09/21/24 1551   Sat Sep 21, 2024   1206 CT head without contrast  IMPRESSION:     1.  Small low density, chronic-appearing subdural hygroma overlying the right frontal convexity. No significant associated mass effect.     2.  No acute extra-axial collections or acute brain parenchymal abnormalities.        1252 CT soft tissue neck  IMPRESSION:  1. Masslike focus arising from the right  space with mass effect on the lower nasopharynx, oropharynx, and even inferiorly to the hypopharynx. There is associated inflammatory or infiltrative  mucosal thickening involving the right posterior   aspect of the oropharynx, also.     Given acute onset with recent history of dental intervention, infectious phlegmon/developing abscess is favored, though neoplasm is also a differential consideration.     Recommend ENT consultation for direct visualization.     2.  Small amount of fluid in the retropharyngeal danger space to just below the hyoid. No extension into the mediastinum.            Medications   sodium chloride 0.9 % infusion (150 mL/hr Intravenous New Bag 9/21/24 1439)   ampicillin-sulbactam (UNASYN) 3 g in sodium chloride 0.9 % 100 mL IVPB (has no administration in time range)   ketorolac (TORADOL) injection 15 mg (has no administration in time range)   dexamethasone (DECADRON) injection 4 mg (has no administration in time range)   acetaminophen (Ofirmev) injection 1,000 mg (0 mg Intravenous Stopped 9/21/24 1115)   sodium chloride 0.9 % bolus 1,000 mL (0 mL Intravenous Stopped 9/21/24 1433)   metoclopramide (REGLAN) injection 10 mg (10 mg Intravenous Given 9/21/24 1100)   diphenhydrAMINE (BENADRYL) injection 25 mg (25 mg Intravenous Given 9/21/24 1100)   dexamethasone (PF) (DECADRON) injection 10 mg (10 mg Intravenous Given 9/21/24 1100)   iohexol (OMNIPAQUE) 350 MG/ML injection (MULTI-DOSE) 85 mL (85 mL Intravenous Given 9/21/24 1141)   ampicillin-sulbactam (UNASYN) 3 g in sodium chloride 0.9 % 100 mL IVPB (0 g Intravenous Stopped 9/21/24 1313)       History of Present Illness       74y.o M w/ h/o BENI, tonsillectomy, and migraine presenting for neck pain/swelling. Of note, 2 weeks ago Pt had a right lower molar crown placed. He was fine until 1 day ago where he developed diffuse throbbing HA different from his usual HA, and right neck swelling. Today his sx progressed to odynophagia, dysphagia, trismus, muffled voice, jaw claudication. Denies neck stiffness, slurred speech, vision changes, drooling, F/C, N/V, CP, SOB, numbness, weakness.      History  provided by:  Patient      Review of Systems   Constitutional:  Positive for appetite change. Negative for activity change, chills and fever.   HENT:  Positive for sore throat, trouble swallowing and voice change. Negative for dental problem and drooling.    Respiratory:  Negative for cough and shortness of breath.    Cardiovascular:  Negative for chest pain.   Gastrointestinal:  Negative for abdominal pain, nausea and vomiting.   Musculoskeletal:  Positive for neck pain. Negative for neck stiffness.   Neurological:  Positive for headaches. Negative for syncope, weakness, light-headedness and numbness.           Objective     ED Triage Vitals [09/21/24 1001]   Temperature Pulse Blood Pressure Respirations SpO2 Patient Position - Orthostatic VS   98.1 °F (36.7 °C) 64 128/93 16 99 % --      Temp src Heart Rate Source BP Location FiO2 (%) Pain Score    -- Monitor Right arm -- 6        Physical Exam  Constitutional:       General: He is not in acute distress.     Appearance: Normal appearance.   HENT:      Mouth/Throat:      Comments: Trismus; uvular deviated to the left; significant tongue elevation; oropharynx mildly obscured by tongue.  Eyes:      Extraocular Movements: Extraocular movements intact.      Conjunctiva/sclera: Conjunctivae normal.   Neck:     Cardiovascular:      Rate and Rhythm: Normal rate and regular rhythm.      Pulses: Normal pulses.      Heart sounds: Normal heart sounds.   Pulmonary:      Effort: Pulmonary effort is normal.      Breath sounds: Normal breath sounds.   Abdominal:      General: Abdomen is flat.   Musculoskeletal:      Cervical back: Normal range of motion. No rigidity.   Skin:     General: Skin is warm and dry.      Capillary Refill: Capillary refill takes less than 2 seconds.   Neurological:      General: No focal deficit present.      Mental Status: He is alert and oriented to person, place, and time.         Labs Reviewed   CBC AND DIFFERENTIAL - Abnormal       Result Value     WBC 14.80 (*)     RBC 4.70      Hemoglobin 14.0      Hematocrit 41.5      MCV 88      MCH 29.8      MCHC 33.7      RDW 13.4      MPV 10.1      Platelets 240      nRBC 0      Segmented % 84 (*)     Immature Grans % 0      Lymphocytes % 7 (*)     Monocytes % 9      Eosinophils Relative 0      Basophils Relative 0      Absolute Neutrophils 12.37 (*)     Absolute Immature Grans 0.06      Absolute Lymphocytes 1.01      Absolute Monocytes 1.32 (*)     Eosinophils Absolute 0.00      Basophils Absolute 0.04     COMPREHENSIVE METABOLIC PANEL - Abnormal    Sodium 138      Potassium 3.9      Chloride 107      CO2 23      ANION GAP 8      BUN 14      Creatinine 0.87      Glucose 118      Calcium 9.2      AST 15      ALT 13      Alkaline Phosphatase 71      Total Protein 7.1      Albumin 4.1      Total Bilirubin 1.14 (*)     eGFR 86      Narrative:     National Kidney Disease Foundation guidelines for Chronic Kidney Disease (CKD):     Stage 1 with normal or high GFR (GFR > 90 mL/min/1.73 square meters)    Stage 2 Mild CKD (GFR = 60-89 mL/min/1.73 square meters)    Stage 3A Moderate CKD (GFR = 45-59 mL/min/1.73 square meters)    Stage 3B Moderate CKD (GFR = 30-44 mL/min/1.73 square meters)    Stage 4 Severe CKD (GFR = 15-29 mL/min/1.73 square meters)    Stage 5 End Stage CKD (GFR <15 mL/min/1.73 square meters)  Note: GFR calculation is accurate only with a steady state creatinine     CT soft tissue neck   Final Interpretation by aSnket Green MD (09/21 2144)   1. Masslike focus arising from the right  space with mass effect on the lower nasopharynx, oropharynx, and even inferiorly to the hypopharynx. There is associated inflammatory or infiltrative mucosal thickening involving the right posterior    aspect of the oropharynx, also.      Given acute onset with recent history of dental intervention, infectious phlegmon/developing abscess is favored, though neoplasm is also a differential consideration.      Recommend  ENT consultation for direct visualization.      2.  Small amount of fluid in the retropharyngeal danger space to just below the hyoid. No extension into the mediastinum.         I personally discussed this study with Cedric Porter on 9/21/2024 12:33 PM.            Workstation performed: [id number]         CT head without contrast   Final Interpretation by Sanket Green MD (09/21 7205)      1.  Small low density, chronic-appearing subdural hygroma overlying the right frontal convexity. No significant associated mass effect.      2.  No acute extra-axial collections or acute brain parenchymal abnormalities.         The study was marked in EPIC for immediate notification.               Workstation performed: [id number]             Procedures    ED Medication and Procedure Management   Prior to Admission Medications   Prescriptions Last Dose Informant Patient Reported? Taking?   Ascorbic Acid (VITAMIN C PO)  Self Yes No   Sig: Take by mouth daily    Cholecalciferol (VITAMIN D3) 2000 units capsule  Self Yes No   Sig: Take by mouth daily    Coenzyme Q10 (CO Q 10) 100 MG CAPS  Self Yes No   Sig: Take by mouth daily    LUTEIN PO  Self Yes No   Sig: Take 2 tablets by mouth daily   Multiple Vitamin (MULTIVITAMIN) tablet  Self Yes No   Sig: Take 1 tablet by mouth daily   Omega-3 Fatty Acids (FISH OIL) 1200 MG CPDR  Self Yes No   Sig: Take by mouth daily    carbamide peroxide (DEBROX) 6.5 % otic solution   No No   Sig: Administer 5 drops to the right ear 2 (two) times a day   cyanocobalamin (VITAMIN B-12) 1,000 mcg tablet  Self Yes No   Sig: Take 3 tablets by mouth daily      Facility-Administered Medications: None     Patient's Medications   Discharge Prescriptions    No medications on file     No discharge procedures on file.     Cedric Porter MD  09/21/24 6780

## 2024-09-21 NOTE — CONSULTS
Oral and Maxillofacial Surgery Consult    Patient Seen Date: 09/21/24 2:41 PM     HPI: Pt is 74 y.o. male  has a past medical history of BPH (benign prostatic hyperplasia), Kidney stone, and Migraines. Consult requested by ED for rule out dental source of neck abscess.  Patient reports left neck swelling 25 years ago, had aspiration done by a surgeon. He was diagnosed with an infectious process with concern of diagnosis of toxoplasmosis, does not remember if he took antibiotics. No recurrence since then. Pt goes to dentist regularly. 2 weeks ago had a fractured lower right first molar, went to dentist who put a temporary crown on his tooth. No issues with lower right tooth until yesterday when he noticed severe neck swelling with redness of right neck, change in voice and pain on swallowing. Pt denies difficulty breathing.     --------------------------------------------------------------------------------------------------------------------------------------  Plan:  - Antibiotics: broad spectrum oral coverage- Unasyn 3g IV q6h while inpatient  - Pain Control: Analgesia as per Primary Team  - Peridex 15mL swish and spit BID x7d  - head of bed elevated  - Diet: NPO for now until ENT evaluation  - F/U: Patient should see general dentist for general oral care.     D/w OMFS attg on call  --------------------------------------------------------------------------------------------------------------------------------------    PMH:   Past Medical History:   Diagnosis Date    BPH (benign prostatic hyperplasia)     Kidney stone     isaias    Migraines         Allergies:   No Known Allergies    Meds:     Current Facility-Administered Medications:     sodium chloride 0.9 % infusion, 150 mL/hr, Intravenous, Continuous, Cedric Porter MD, Last Rate: 150 mL/hr at 09/21/24 1439, 150 mL/hr at 09/21/24 1439    Current Outpatient Medications:     Ascorbic Acid (VITAMIN C PO), Take by mouth daily , Disp: , Rfl:     carbamide peroxide  (DEBROX) 6.5 % otic solution, Administer 5 drops to the right ear 2 (two) times a day, Disp: 15 mL, Rfl: 0    Cholecalciferol (VITAMIN D3) 2000 units capsule, Take by mouth daily , Disp: , Rfl:     Coenzyme Q10 (CO Q 10) 100 MG CAPS, Take by mouth daily , Disp: , Rfl:     cyanocobalamin (VITAMIN B-12) 1,000 mcg tablet, Take 3 tablets by mouth daily, Disp: , Rfl:     LUTEIN PO, Take 2 tablets by mouth daily, Disp: , Rfl:     Multiple Vitamin (MULTIVITAMIN) tablet, Take 1 tablet by mouth daily, Disp: , Rfl:     Omega-3 Fatty Acids (FISH OIL) 1200 MG CPDR, Take by mouth daily , Disp: , Rfl:     PSH:   Past Surgical History:   Procedure Laterality Date    COLONOSCOPY      FL RETROGRADE PYELOGRAM  11/19/2020    LITHOTRIPSY      LYMPH NODE BIOPSY  1999    cervical, path showerd toxoplasmosis, no treatment    HI CYSTO INSERTION TRANSPROSTATIC IMPLANT SINGLE N/A 7/9/2021    Procedure: CYSTOSCOPY WITH INSERTION UROLIFT;  Surgeon: Godfrey Curtis MD;  Location: [facility];  Service: Urology    HI CYSTO/URETERO W/LITHOTRIPSY &INDWELL STENT INSRT Left 11/19/2020    Procedure: CYSTOSCOPY URETEROSCOPY WITH LITHOTRIPSY HOLMIUM LASER, RIGHT AND LEFT RETROGRADE PYELOGRAM  AND INSERTION LEFT  STENT URETERAL;  Surgeon: Aj Guerra MD;  Location: [facility];  Service: Urology    TONSILLECTOMY        Family History   Problem Relation Age of Onset    Colon cancer Mother         dx late 60's    Migraines Mother     Cancer Mother 60        Liver or Colon cancer?    Lung cancer Father     Alcohol abuse Neg Hx     Substance Abuse Neg Hx     Mental illness Neg Hx     Depression Neg Hx         Review of Systems   +odynophagia, +change to voice, +restriction to mouth opening  - fever, -difficulty breathing, - numbness    Temp:  [98 °F (36.7 °C)-98.1 °F (36.7 °C)] 98.1 °F (36.7 °C)  HR:  [64-77] 68  Resp:  [16] 16  BP: ()/(58-93) 104/58  SpO2:  [95 %-99 %] 96 %       Intake/Output Summary (Last 24 hours) at 9/21/2024  1441  Last data filed at 9/21/2024 1433  Gross per 24 hour   Intake 1200 ml   Output --   Net 1200 ml          Physical Exam:  Gen: AAOx3. NAD. Tolerating secretions  Resp: Unlabored on RA.  Neuro: CN2-12 grossly intact   Head: Normocephalic. R parotid area erythema with mild tenderness. R CLAD. Inferior border of mandible is palpable.  Eyes: Grossly normal., EOMI. , PERRLA. No changes to vision, diplopia, exophthalmos, enophthalmos.   Ears: Grossly normal.  Nose: Grossly normal  Intraoral: ORLY restricted ~30mm with soft stop. Occlusion stable. Temporary crown #30. No TTP or mobility of teeth. Unerupted #17. FOM soft, non-elevated, non-tender. Right pharyngeal wall deviation to left. Uvula deviated to left.    Imaging:   CT Neck Soft tissue   - 3.9x2.9x1.9 cm mass within right pterygomandibular space with left deviation of epiglottis, right pharyngeal edema  - No PARL of teeth, horizontally impacted tooth #17 without periapical lucency or cortical erosion.    Assessment:  74 y.o. male presents with right pterygomandibular phlegmon vs neoplasm with deviation of airway. Despite recent dental work, there is no evidence of dental pathology. Given history of tonsil removal, peritonsillar abscess is unlikely however would recommend ENT evaluation given lower concern for dental source.     Nonetheless, given phlegmonous state of mass, would favor antibiotics rather than incision and drainage unless patient's condition worsens.     Consults     Counseling / Coordination of Care  Total floor / unit time spent today 30 minutes.  Greater than 50% of total time was spent with the patient and / or family counseling and / or coordination of care.  A description of the counseling / coordination of care: description of injury with proposed plan of care. Discussed risks, benefits, and alternatives to treatment plan. All questions were answered. Patient in agreement with plan.

## 2024-09-21 NOTE — PROGRESS NOTES
[facility]        NAME: Cody Fenton is a 74 y.o. male  : 1950    MRN: [id number]  DATE: 2024  TIME: 9:07 AM    Assessment and Plan   Acute intractable headache, unspecified headache type [R51.9]  1. Acute intractable headache, unspecified headache type  ketorolac (TORADOL) injection 30 mg    Transfer to other facility      2. Neck swelling  ketorolac (TORADOL) injection 30 mg    Transfer to other facility      He is very uncomfortable appearing and unable to open his mouth due to pain, unable to visualize pharynx.  Very tender to area of swelling to his right jaw/neck area.  Given dose of Toradol in office for pain, recommended ER transfer for further evaluation, concern for abscess.  Patient and wife are agreeable to proceed to [facility] ER now, his wife will transport him.      Patient Instructions   To ER now.    Chief Complaint     Chief Complaint   Patient presents with    Headache     Started with headache yesterday . Last dose of otc wegman brand migraine medication yesterday. Denies visual disturbances . Gait off while walking to exam room . Brought patient back by wheel chair. Right side neck and swelling and throat pain which started yesterday. Pain with swallowing.     Edema         History of Present Illness       Patient presents with his wife who assists with history.  He has history of migraines, started with a headache yesterday that did not improve with generic Excedrin medication.  He describes his headache as his entire head, not focal.  He also started to have swelling to the right side of his neck severe right side throat pain causing difficulty swallowing and opening his mouth.  Did have history of neck swelling greater than 20 years ago at that time, they believe he was found to have an infectious process (believe possibly toxoplasmosis?).  Was not able to sleep last night due to the pain.  Has not experienced fevers or chills.  Noted by nursing to be off  balance with his gait, this is not his baseline, per patient and his wife feel that this is due to pain and not sleeping last night, he does not feel areas of focal weakness/numbness.        Review of Systems   Review of Systems   All other systems reviewed and are negative.        Current Medications       Current Outpatient Medications:     Ascorbic Acid (VITAMIN C PO), Take by mouth daily , Disp: , Rfl:     carbamide peroxide (DEBROX) 6.5 % otic solution, Administer 5 drops to the right ear 2 (two) times a day, Disp: 15 mL, Rfl: 0    Cholecalciferol (VITAMIN D3) 2000 units capsule, Take by mouth daily , Disp: , Rfl:     Coenzyme Q10 (CO Q 10) 100 MG CAPS, Take by mouth daily , Disp: , Rfl:     cyanocobalamin (VITAMIN B-12) 1,000 mcg tablet, Take 3 tablets by mouth daily, Disp: , Rfl:     LUTEIN PO, Take 2 tablets by mouth daily, Disp: , Rfl:     Multiple Vitamin (MULTIVITAMIN) tablet, Take 1 tablet by mouth daily, Disp: , Rfl:     Omega-3 Fatty Acids (FISH OIL) 1200 MG CPDR, Take by mouth daily , Disp: , Rfl:     Current Facility-Administered Medications:     ketorolac (TORADOL) injection 30 mg, 30 mg, Intramuscular, Once,     Current Allergies     Allergies as of 09/21/2024    (No Known Allergies)            The following portions of the patient's history were reviewed and updated as appropriate: allergies, current medications, past family history, past medical history, past social history, past surgical history and problem list.     Past Medical History:   Diagnosis Date    BPH (benign prostatic hyperplasia)     Kidney stone     isaias       Past Surgical History:   Procedure Laterality Date    COLONOSCOPY      FL RETROGRADE PYELOGRAM  11/19/2020    LITHOTRIPSY      LYMPH NODE BIOPSY  1999    cervical, path showerd toxoplasmosis, no treatment    SC CYSTO INSERTION TRANSPROSTATIC IMPLANT SINGLE N/A 7/9/2021    Procedure: CYSTOSCOPY WITH INSERTION UROLIFT;  Surgeon: Godfrey Curtis MD;  Location: [facility] MAIN  OR;  Service: Urology    VT CYSTO/URETERO W/LITHOTRIPSY &INDWELL STENT INSRT Left 11/19/2020    Procedure: CYSTOSCOPY URETEROSCOPY WITH LITHOTRIPSY HOLMIUM LASER, RIGHT AND LEFT RETROGRADE PYELOGRAM  AND INSERTION LEFT  STENT URETERAL;  Surgeon: Aj Guerra MD;  Location: [facility]  MAIN [facility];  Service: Urology    TONSILLECTOMY         Family History   Problem Relation Age of Onset    Colon cancer Mother         dx late 60's    Migraines Mother     Cancer Mother 60        Liver or Colon cancer?    Lung cancer Father     Alcohol abuse Neg Hx     Substance Abuse Neg Hx     Mental illness Neg Hx     Depression Neg Hx          Medications have been verified.        Objective   /88   Pulse 71   Temp 98 °F (36.7 °C) (Oral)   Resp 16   SpO2 99%   No LMP for male patient.       Physical Exam     Physical Exam  Vitals and nursing note reviewed.   Constitutional:       General: He is in acute distress (uncomfortable appearing on stretcher, closing eyes).      Appearance: Normal appearance. He is not ill-appearing.   HENT:      Head: Normocephalic and atraumatic.      Right Ear: External ear normal. There is impacted cerumen.      Left Ear: Tympanic membrane, ear canal and external ear normal.      Nose: Nose normal. No congestion or rhinorrhea.      Mouth/Throat:      Comments: Unable to open mouth beyond about 4 cm due to pain - unable to visualize posterior pharynx  Eyes:      Extraocular Movements: Extraocular movements intact.   Neck:      Comments: Tenderness and swelling area of R parotid and below R jaw  Cervical ROM nearly full - but with pain  Cardiovascular:      Rate and Rhythm: Normal rate and regular rhythm.      Heart sounds: Normal heart sounds.   Pulmonary:      Effort: Pulmonary effort is normal. No respiratory distress.      Breath sounds: Normal breath sounds. No stridor. No wheezing, rhonchi or rales.   Musculoskeletal:      Cervical back: Tenderness present.   Skin:     General: Skin is warm and  dry.      Capillary Refill: Capillary refill takes less than 2 seconds.      Findings: No rash.   Neurological:      Mental Status: He is alert.      Comments: Some swaying with romberg   Psychiatric:         Behavior: Behavior normal.         Thought Content: Thought content normal.

## 2024-09-21 NOTE — ED ATTENDING ATTESTATION
9/21/2024  IMauricio DO, saw and evaluated the patient. I have discussed the patient with the resident/non-physician practitioner and agree with the resident's/non-physician practitioner's findings, Plan of Care, and MDM as documented in the resident's/non-physician practitioner's note, except where noted. All available labs and Radiology studies were reviewed.  I was present for key portions of any procedure(s) performed by the resident/non-physician practitioner and I was immediately available to provide assistance.       At this point I agree with the current assessment done in the Emergency Department.  I have conducted an independent evaluation of this patient a history and physical is as follows:    74-year-old male coming into the ED for evaluation of right-sided neck and throat swelling, onset 3 days ago.  He does have a history of tonsillectomy when he was a child.  He went to urgent care prior to arrival and was sent to the ED for evaluation.  He did have dental work to his right mandibular tooth, about 2 weeks ago.  He denies any fevers or chills.  He does have pain and difficulty swallowing and feels like his voice is muffled.  No difficulty breathing.    PE:  The patient is well appearing, non-toxic, in NAD. Head: normocephalic, atraumatic. HEENT: mucous membranes moist.  His voice is slightly hoarse, he has right facial swelling and tenderness, about the parotid and submandibular region on the right.  No elevation of the floor the mouth, no drooling, no stridor.  He does have moderate trismus.  He has right-sided posterior oropharyngeal swelling with deviation of the uvula to the left.   lungs: CTA b/l, no resp distress. Heart: RRR. No M/R/G. Abdomen: NT, ND, no R/R/G. Neuro: CN2-12 intact, GCS 15. Normal strength and sensation, normal speech and gait. Cap refill < 2 sec, skin warm and dry. No rashes or lesions.    ED workup - concern for peritonsillar/neck abscess, dental abscess.  CT does  show concern for a developing abscess/phlegmon with mass effect on the uvula and epiglottis, in both the pharynx and hypopharynx.  Case d/w crit care and OMFS and ENT.  Crit care agreed to admit with consults by the above, Stepdown 1, under Dr Garay.    ED Course         Critical Care Time  Procedures

## 2024-09-21 NOTE — H&P
H&P - Critical Care/ICU   Name: Cody Fenton 74 y.o. male I MRN: [id number]  Unit/Bed#: ED-33 I Date of Admission: 9/21/2024   Date of Service: 9/21/2024 I Hospital Day: 0       Assessment & Plan  Mandibular abscess  Presents with 3 day history of pain in right lower jaw associated with swelling and difficulty opening mouth  Recently had had dental crown placement 2 weeks prior to onset of symptoms, denies tooth extraction or manipulation of teeth/gum/jaw. No abx given at that time  Peak swelling was night prior to presentation, has not gotten worse.  Was administered decadron and toradol at outpatient urgent care with improvement in pain and swelling.  Denies fever, chills, difficulty breathing, wheezing.  Complains of some difficulty swallowing foods and slurred speech  Afebrile, heart rate wnl, RR wnl, Blood Pressure: 108/72 , saturating well on room air.  Does not meet sepsis criteria  Physical exam positive for swelling and tenderness to palpation at angle of right mandible.  Mild erythema present  Recent Labs     09/21/24  1058   WBC 14.80*   CT soft tissue head and neck 9/21: Masslike focus arising from the right  space with mass effect on the lower nasopharynx, oropharynx, and even inferiorly to the hypopharynx. There is associated inflammatory or infiltrative mucosal thickening involving the right posterior   aspect of the oropharynx.  Developing abscess/infectious phlegmon favored.  Malignancy cannot be excluded.    Upper airway does appear at risk for occlusion on CT imaging  Administered decadron and Unasyn 3g in the ED with continued improvement in symptoms.      Plan:  Admit to SD[facility] for airway watch  Trend CBC, fever curve.  Get procalcitonin  Continue unasyn 3 g q6h  Continue IV decadron 4 mg qd  Toradol 15 mg every 6 hours scheduled x2 days  OMFS and ENT consulted, Recommendations are appreciated  NPO for probable surgical intervention  Lovenox 40 mg qd for DVT prophylaxis  UOOB as  tolerated        BEIN (obstructive sleep apnea)  Has declined treatment in past  CPAP qhs ordered and encouraged to be used while inpatient  Disposition: Stepdown Level 1    History of Present Illness   Cody Fenton is a 74 y.o. who presents with 3 day history of right sided jaw pain associated swelling, erythema, difficulty opening his mouth and swallowing.  Patient also complained of mild bandlike headache throughout the day prior to onset of symptoms.  Patient had dental work done approximately 2 weeks ago which she reports he had a crown placed.  He denies tooth extraction, manipulation of his gums or jaw.  He is not administered antibiotics for this appointment.  Pain was at its worst yesterday evening and was associated with difficulty opening his mouth.  Pain was still present in the morning prompting him to be evaluated as outpatient where he was administered Toradol which alleviated his pain.  He was then recommended to go to the emergency department for further evaluation.    On arrival to the emergency department patient was hemodynamically stable.  Initial laboratory studies showed elevated leukocyte count of 14.8.  Electrolytes and renal function were within normal limits.  Patient was started on IV Unasyn, Decadron and administered more Toradol with continued improvement of his symptoms.  CT imaging of the neck was concerning for masslike focus arising from right  space favoring infectious phlegmon/abscess.  ENT and OMFS were consulted for further evaluation.  Critical care was asked to evaluate the patient to the emergency department due to concern of impending airway obstruction.    History obtained from the patient.  Review of Systems: Review of Systems   Constitutional:  Negative for chills and fever.   HENT:  Positive for trouble swallowing. Negative for ear pain, sore throat and voice change.         Right jaw pain   Eyes:  Negative for pain and visual disturbance.   Respiratory:   Negative for cough, choking, chest tightness, shortness of breath, wheezing and stridor.    Cardiovascular:  Negative for chest pain and palpitations.   Gastrointestinal:  Negative for abdominal pain and vomiting.   Genitourinary:  Negative for dysuria and hematuria.   Musculoskeletal:  Negative for arthralgias and back pain.   Skin:  Negative for color change and rash.   Neurological:  Negative for seizures and syncope.   All other systems reviewed and are negative.      Historical Information   Past Medical History:  No date: BPH (benign prostatic hyperplasia)  No date: Kidney stone      Comment:  isaias  No date: Migraines Past Surgical History:  No date: COLONOSCOPY  11/19/2020: FL RETROGRADE PYELOGRAM  No date: LITHOTRIPSY  1999: LYMPH NODE BIOPSY      Comment:  cervical, path showerd toxoplasmosis, no treatment  7/9/2021: [place] CYSTO INSERTION TRANSPROSTATIC IMPLANT SINGLE; N/A      Comment:  Procedure: CYSTOSCOPY WITH INSERTION UROLIFT;  Surgeon:                Godfrey Curtis MD;  Location[facility];                 Service: Urology  11/19/2020: [place] CYSTO/URETERO W/LITHOTRIPSY &INDWELL STENT INSRT; Left      Comment:  Procedure: CYSTOSCOPY URETEROSCOPY WITH LITHOTRIPSY                HOLMIUM LASER, RIGHT AND LEFT RETROGRADE PYELOGRAM  AND                INSERTION LEFT  STENT URETERAL;  Surgeon: Aj Guerra MD;  Location[facility];  Service: Urology  No date: TONSILLECTOMY   Current Outpatient Medications   Medication Instructions    Ascorbic Acid (VITAMIN C PO) Oral, Daily    carbamide peroxide (DEBROX) 6.5 % otic solution 5 drops, Right Ear, 2 times daily    Cholecalciferol (VITAMIN D3) 2000 units capsule Oral, Daily    Coenzyme Q10 (CO Q 10) 100 MG CAPS Oral, Daily    cyanocobalamin (VITAMIN B-12) 1,000 mcg tablet 3 tablets, Oral, Daily    LUTEIN PO 2 tablets, Oral, Daily    Multiple Vitamin (MULTIVITAMIN) tablet 1 tablet, Oral, Daily    Omega-3 Fatty Acids (FISH OIL) 1200 MG  CPDR Oral, Daily    No Known Allergies   Social History     Tobacco Use    Smoking status: Never    Smokeless tobacco: Never   Vaping Use    Vaping status: Never Used   Substance Use Topics    Alcohol use: Yes     Comment: 2 x per week    Drug use: No    Family History   Problem Relation Age of Onset    Colon cancer Mother         dx late 60's    Migraines Mother     Cancer Mother 60        Liver or Colon cancer?    Lung cancer Father     Alcohol abuse Neg Hx     Substance Abuse Neg Hx     Mental illness Neg Hx     Depression Neg Hx           Objective                          Vitals I/O      Most Recent Min/Max in 24hrs   Temp 98.1 °F (36.7 °C) Temp  Min: 98 °F (36.7 °C)  Max: 98.1 °F (36.7 °C)   Pulse 67 Pulse  Min: 64  Max: 77   Resp 16 Resp  Min: 16  Max: 16   /72 BP  Min: 96/60  Max: 159/88   O2 Sat 95 % SpO2  Min: 95 %  Max: 99 %      Intake/Output Summary (Last 24 hours) at 9/21/2024 1613  Last data filed at 9/21/2024 1433  Gross per 24 hour   Intake 1200 ml   Output --   Net 1200 ml       Diet NPO    Invasive Monitoring           Physical Exam   Physical Exam  Vitals and nursing note reviewed.   Eyes:      General: Vision grossly intact. No scleral icterus.        Right eye: No discharge.         Left eye: No discharge.      Extraocular Movements: Extraocular movements intact.      Pupils: Pupils are equal, round, and reactive to light.   Skin:     General: Skin is warm and dry.   HENT:      Head: Normocephalic and atraumatic.      Mouth/Throat:      Mouth: Mucous membranes are moist.      Pharynx: No oropharyngeal exudate.      Comments: Erythema, swelling and induration noted on angle of right mandible, tender to touch, warm to touch  Neck:      Vascular: No JVD.   Cardiovascular:      Rate and Rhythm: Normal rate and regular rhythm.      Pulses: Normal pulses.      Heart sounds: Normal heart sounds.   Musculoskeletal:         General: Normal range of motion.   Abdominal:      Palpations: Abdomen is  soft.      Tenderness: There is no abdominal tenderness.   Constitutional:       General: He is not in acute distress.     Appearance: He is well-developed and well-nourished. He is not ill-appearing or toxic-appearing.   Pulmonary:      Effort: Pulmonary effort is normal. No accessory muscle usage, respiratory distress or accessory muscle usage.      Breath sounds: Normal breath sounds. No stridor. No wheezing or rales.   Secretions are normal.Psychiatric:         Mood and Affect:  mood and affect abnormal.  Neurological:      General: No focal deficit present.      Mental Status: He is alert and oriented to person, place and time. Mental status is at baseline. He is calm.      Sensory: Sensation is intact.      Motor: gross motor function is at baseline for patient. Strength full and intact in all extremities.          Diagnostic Studies        Lab Results: I have reviewed the following results:      Medications:  Scheduled PRN   ampicillin-sulbactam, 3 g, Q6H  [START ON 9/22/2024] dexamethasone, 4 mg, Daily  ketorolac, 15 mg, Q6H MARILYN          Continuous    sodium chloride, 150 mL/hr, Last Rate: 150 mL/hr (09/21/24 1439)         Labs:   CBC    Recent Labs     09/21/24  1058   WBC 14.80*   HGB 14.0   HCT 41.5        BMP    Recent Labs     09/21/24  1058   SODIUM 138   K 3.9      CO2 23   AGAP 8   BUN 14   CREATININE 0.87   CALCIUM 9.2       Coags    No recent results     Additional Electrolytes  No recent results       Blood Gas    No recent results  No recent results LFTs  Recent Labs     09/21/24  1058   ALT 13   AST 15   ALKPHOS 71   ALB 4.1   TBILI 1.14*       Infectious  No recent results  Glucose  Recent Labs     09/21/24  1058   GLUC 118

## 2024-09-21 NOTE — CONSULTS
Otorhinolaryngology - Head & Neck Surgery Consultation    Date of Service: 9/21/2024    Reason for consult: R  space, oropharyngeal, hypopharyngeal swelling    ASSESSMENT/PLAN:    -airway patent on NPL (see image below)  -symptoms sig improved post receiving medical tx, no acute ENT interventions indicated  -rec at least 48-72hr of IV abx for surveillance prior to dc consideration  -potential surg management per OMFS  -rest of care per primary    HPI  Cody Fenton is a 74 y.o. male c PMH of BPH, kidney stone, migraine, childhood tonsillectomy, neck LAD biopsy (+) for toxoplasmosis in 1999, who presented to ED c 1d hx of worsening dysphagia, odynophagia, dysphonia (largely hoarseness). No dyspnea. Symptoms sig improved post receiving abx + steroid in ED.  Pt isn't on regular meds, no hx of frequent infections.  No tobacco hx, social drinker, no recreational drug usage.  No recent F/C, night sweat, wt changes.    LABORATORY  9/21/24:  WBC 14.8    RADIOLOGY  CT soft tissue neck (9/21/24):  1. Masslike focus arising from the right  space with mass effect on the lower nasopharynx, oropharynx, and even inferiorly to the hypopharynx. There is associated inflammatory or infiltrative mucosal thickening involving the right posterior   aspect of the oropharynx, also.  Given acute onset with recent history of dental intervention, infectious phlegmon/developing abscess is favored, though neoplasm is also a differential consideration.  2.  Small amount of fluid in the retropharyngeal danger space to just below the hyoid. No extension into the mediastinum.    PROCEDURES  PROCEDURE: Flexible Laryngoscopy  Indication:  R  space, oropharyngeal, hypopharyngeal swelling  Verbal consent obtained  Surgeon: Prakash Starr MD  Scope passed through nasal cavity  Nasopharynx: unremarkable, no sig swelling, no mass/lesion  Oropharynx: unremarkable, no sig swelling, no mass/lesion, BoT no  mass/lesion  Hypopharynx/Larynx:   Vocal fold mobility = intact b/l c both abduction & adduction, possible mild glottic gap   Laryngeal edema  = R lat hypopharyngeal wall swelling, extending to R arytenoid region, midline post-cricoid region.   Laryngeal erythema = mod on R   Vocal folds = unremarkable   Other findings = -  Scope was removed  Patient tolerated procedure well without complications          CURRENT HOSPITAL MEDICATIONS  Current Facility-Administered Medications   Medication Dose Route Frequency Provider Last Rate Last Admin    ampicillin-sulbactam (UNASYN) 3 g in sodium chloride 0.9 % 100 mL IVPB  3 g Intravenous Q6H Mike Penaloza MD        [START ON 9/22/2024] dexamethasone (DECADRON) injection 4 mg  4 mg Intravenous Daily Mike Penaloza MD        ketorolac (TORADOL) injection 15 mg  15 mg Intravenous Q6H [facility] Mike Penaloza MD        sodium chloride 0.9 % infusion  150 mL/hr Intravenous Continuous Cedric Porter  mL/hr at 09/21/24 1439 150 mL/hr at 09/21/24 1439     Current Outpatient Medications   Medication Sig Dispense Refill    Ascorbic Acid (VITAMIN C PO) Take by mouth daily       carbamide peroxide (DEBROX) 6.5 % otic solution Administer 5 drops to the right ear 2 (two) times a day 15 mL 0    Cholecalciferol (VITAMIN D3) 2000 units capsule Take by mouth daily       Coenzyme Q10 (CO Q 10) 100 MG CAPS Take by mouth daily       cyanocobalamin (VITAMIN B-12) 1,000 mcg tablet Take 3 tablets by mouth daily      LUTEIN PO Take 2 tablets by mouth daily      Multiple Vitamin (MULTIVITAMIN) tablet Take 1 tablet by mouth daily      Omega-3 Fatty Acids (FISH OIL) 1200 MG CPDR Take by mouth daily          REVIEW OF SYSTEMS  As above    HISTORIES  PMH:  Past Medical History:   Diagnosis Date    BPH (benign prostatic hyperplasia)     Kidney stone     isaias    Migraines        PSH:  Past Surgical History:   Procedure Laterality Date    COLONOSCOPY      FL RETROGRADE PYELOGRAM  11/19/2020     LITHOTRIPSY      LYMPH NODE BIOPSY  1999    cervical, path showerd toxoplasmosis, no treatment    AZ CYSTO INSERTION TRANSPROSTATIC IMPLANT SINGLE N/A 7/9/2021    Procedure: CYSTOSCOPY WITH INSERTION UROLIFT;  Surgeon: Godfrey Curtis MD;  Location: [facility];  Service: Urology    AZ CYSTO/URETERO W/LITHOTRIPSY &INDWELL STENT INSRT Left 11/19/2020    Procedure: CYSTOSCOPY URETEROSCOPY WITH LITHOTRIPSY HOLMIUM LASER, RIGHT AND LEFT RETROGRADE PYELOGRAM  AND INSERTION LEFT  STENT URETERAL;  Surgeon: Aj Guerra MD;  Location: [facility];  Service: Urology    TONSILLECTOMY         SH:  Social History     Tobacco Use    Smoking status: Never    Smokeless tobacco: Never   Vaping Use    Vaping status: Never Used   Substance Use Topics    Alcohol use: Yes     Comment: 2 x per week    Drug use: No       FH:  Family History   Problem Relation Age of Onset    Colon cancer Mother         dx late 60's    Migraines Mother     Cancer Mother 60        Liver or Colon cancer?    Lung cancer Father     Alcohol abuse Neg Hx     Substance Abuse Neg Hx     Mental illness Neg Hx     Depression Neg Hx        ALLERGIES:  No Known Allergies    PHYSICAL EXAM  Visit Vitals  /72   Pulse 67   Temp 98.1 °F (36.7 °C)   Resp 16   SpO2 95%   Smoking Status Never       General: NAD, resting in bed  Ears: External appearance normal  Nose: External appearance normal, some b/l ITH  Oral cavity: External appearance normal, some trismus, no erythema, swelling, soft palate fullness, uvular deviation visualized  Neck: Trachea is midline, no thyroid nodules, salivary glands symmetrical, no mass/abnormality on palpation  FROM  Skin: No obvious facial lesions  Neuro: Motor and sensory grossly intact, face symmetrical, no obvious cranial nerve palsies, motor and sensory grossly intact, no focal deficits  Lungs: Normal work of breathing, symmetrical chest expansion on RA  Vascular: Well perfused    Patient Active Problem List    Diagnosis  Date Noted    Mandibular abscess 09/21/2024    BENI (obstructive sleep apnea)     Left ureteral calculus 11/19/2020    [place] cardiac risk 10-20% in next 10 years 07/02/2020    History of nephrolithiasis 03/20/2018    Fatty liver 07/31/2017    Low vitamin D level 07/15/2016    Lung nodule 06/05/2015    Benign prostatic hyperplasia without lower urinary tract symptoms 02/21/2014       Prakash Starr MD  PGY-3  Otorhinolaryngology - Head & Neck Surgery

## 2024-09-21 NOTE — ASSESSMENT & PLAN NOTE
Presents with 3 day history of pain in right lower jaw associated with swelling and difficulty opening mouth  Recently had had dental crown placement 2 weeks prior to onset of symptoms, denies tooth extraction or manipulation of teeth/gum/jaw. No abx given at that time  Peak swelling was night prior to presentation, has not gotten worse.  Was administered decadron and toradol at outpatient urgent care with improvement in pain and swelling.  Denies fever, chills, difficulty breathing, wheezing.  Complains of some difficulty swallowing foods and slurred speech  Afebrile, heart rate wnl, RR wnl, Blood Pressure: 108/72 , saturating well on room air.  Does not meet sepsis criteria  Physical exam positive for swelling and tenderness to palpation at angle of right mandible.  Mild erythema present  Recent Labs     09/21/24  1058   WBC 14.80*   CT soft tissue head and neck 9/21: Masslike focus arising from the right  space with mass effect on the lower nasopharynx, oropharynx, and even inferiorly to the hypopharynx. There is associated inflammatory or infiltrative mucosal thickening involving the right posterior   aspect of the oropharynx.  Developing abscess/infectious phlegmon favored.  Malignancy cannot be excluded.    Upper airway does appear at risk for occlusion on CT imaging  Administered decadron and Unasyn 3g in the ED with continued improvement in symptoms.      Plan:  Admit to SD1 for airway watch  Trend CBC, fever curve.  Get procalcitonin  Continue unasyn 3 g q6h  Continue IV decadron 4 mg qd  Toradol 15 mg every 6 hours scheduled x2 days  OMFS and ENT consulted, Recommendations are appreciated  NPO for probable surgical intervention  Lovenox 40 mg qd for DVT prophylaxis  UOOB as tolerated

## 2024-09-21 NOTE — RESPIRATORY THERAPY NOTE
Patient ordered on CPAP HS. Patient does have diagnosed BENI with a AHI of 33.6. Patient does not wear a CPAP at home currently. Patient is not interested in using CPAP while impatient. No unit in room. [place] message sent to Dr. Penaloza about patients refusal. Order DC at this time.

## 2024-09-22 ENCOUNTER — APPOINTMENT (INPATIENT)
Dept: CT IMAGING | Facility: HOSPITAL | Age: 74
DRG: 138 | End: 2024-09-22
Payer: COMMERCIAL

## 2024-09-22 LAB
ANION GAP SERPL CALCULATED.3IONS-SCNC: 7 MMOL/L (ref 4–13)
BASOPHILS # BLD AUTO: 0.02 THOUSANDS/ΜL (ref 0–0.1)
BASOPHILS NFR BLD AUTO: 0 % (ref 0–1)
BUN SERPL-MCNC: 17 MG/DL (ref 5–25)
CALCIUM SERPL-MCNC: 8.7 MG/DL (ref 8.4–10.2)
CHLORIDE SERPL-SCNC: 109 MMOL/L (ref 96–108)
CO2 SERPL-SCNC: 22 MMOL/L (ref 21–32)
CREAT SERPL-MCNC: 0.93 MG/DL (ref 0.6–1.3)
EOSINOPHIL # BLD AUTO: 0 THOUSAND/ΜL (ref 0–0.61)
EOSINOPHIL NFR BLD AUTO: 0 % (ref 0–6)
ERYTHROCYTE [DISTWIDTH] IN BLOOD BY AUTOMATED COUNT: 13.8 % (ref 11.6–15.1)
GFR SERPL CREATININE-BSD FRML MDRD: 81 ML/MIN/1.73SQ M
GLUCOSE SERPL-MCNC: 128 MG/DL (ref 65–140)
HCT VFR BLD AUTO: 37.3 % (ref 36.5–49.3)
HGB BLD-MCNC: 12.4 G/DL (ref 12–17)
IMM GRANULOCYTES # BLD AUTO: 0.09 THOUSAND/UL (ref 0–0.2)
IMM GRANULOCYTES NFR BLD AUTO: 1 % (ref 0–2)
LYMPHOCYTES # BLD AUTO: 1.41 THOUSANDS/ΜL (ref 0.6–4.47)
LYMPHOCYTES NFR BLD AUTO: 9 % (ref 14–44)
MCH RBC QN AUTO: 29.5 PG (ref 26.8–34.3)
MCHC RBC AUTO-ENTMCNC: 33.2 G/DL (ref 31.4–37.4)
MCV RBC AUTO: 89 FL (ref 82–98)
MONOCYTES # BLD AUTO: 1.21 THOUSAND/ΜL (ref 0.17–1.22)
MONOCYTES NFR BLD AUTO: 8 % (ref 4–12)
NEUTROPHILS # BLD AUTO: 13.15 THOUSANDS/ΜL (ref 1.85–7.62)
NEUTS SEG NFR BLD AUTO: 82 % (ref 43–75)
NRBC BLD AUTO-RTO: 0 /100 WBCS
PLATELET # BLD AUTO: 210 THOUSANDS/UL (ref 149–390)
PMV BLD AUTO: 10.2 FL (ref 8.9–12.7)
POTASSIUM SERPL-SCNC: 3.9 MMOL/L (ref 3.5–5.3)
RBC # BLD AUTO: 4.21 MILLION/UL (ref 3.88–5.62)
SODIUM SERPL-SCNC: 138 MMOL/L (ref 135–147)
WBC # BLD AUTO: 15.88 THOUSAND/UL (ref 4.31–10.16)

## 2024-09-22 PROCEDURE — 80048 BASIC METABOLIC PNL TOTAL CA: CPT

## 2024-09-22 PROCEDURE — 99231 SBSQ HOSP IP/OBS SF/LOW 25: CPT | Performed by: OTOLARYNGOLOGY

## 2024-09-22 PROCEDURE — 99232 SBSQ HOSP IP/OBS MODERATE 35: CPT | Performed by: INTERNAL MEDICINE

## 2024-09-22 PROCEDURE — 70491 CT SOFT TISSUE NECK W/DYE: CPT

## 2024-09-22 PROCEDURE — 85025 COMPLETE CBC W/AUTO DIFF WBC: CPT

## 2024-09-22 RX ORDER — POLYETHYLENE GLYCOL 3350 17 G/17G
17 POWDER, FOR SOLUTION ORAL ONCE
Status: DISCONTINUED | OUTPATIENT
Start: 2024-09-22 | End: 2024-09-22

## 2024-09-22 RX ORDER — ACETAMINOPHEN 325 MG/1
650 TABLET ORAL ONCE
Status: COMPLETED | OUTPATIENT
Start: 2024-09-22 | End: 2024-09-22

## 2024-09-22 RX ORDER — POLYETHYLENE GLYCOL 3350 17 G/17G
17 POWDER, FOR SOLUTION ORAL DAILY PRN
Status: DISCONTINUED | OUTPATIENT
Start: 2024-09-22 | End: 2024-09-24 | Stop reason: HOSPADM

## 2024-09-22 RX ADMIN — ENOXAPARIN SODIUM 40 MG: 40 INJECTION SUBCUTANEOUS at 08:13

## 2024-09-22 RX ADMIN — AMPICILLIN AND SULBACTAM 3 G: 10; 5 INJECTION, POWDER, FOR SOLUTION INTRAVENOUS at 00:11

## 2024-09-22 RX ADMIN — KETOROLAC TROMETHAMINE 15 MG: 30 INJECTION, SOLUTION INTRAMUSCULAR; INTRAVENOUS at 06:22

## 2024-09-22 RX ADMIN — AMPICILLIN AND SULBACTAM 3 G: 10; 5 INJECTION, POWDER, FOR SOLUTION INTRAVENOUS at 12:11

## 2024-09-22 RX ADMIN — DEXAMETHASONE SODIUM PHOSPHATE 4 MG: 4 INJECTION INTRA-ARTICULAR; INTRALESIONAL; INTRAMUSCULAR; INTRAVENOUS; SOFT TISSUE at 08:13

## 2024-09-22 RX ADMIN — AMPICILLIN AND SULBACTAM 3 G: 10; 5 INJECTION, POWDER, FOR SOLUTION INTRAVENOUS at 17:26

## 2024-09-22 RX ADMIN — IOHEXOL 85 ML: 350 INJECTION, SOLUTION INTRAVENOUS at 14:40

## 2024-09-22 RX ADMIN — ACETAMINOPHEN 650 MG: 325 TABLET ORAL at 23:11

## 2024-09-22 RX ADMIN — CHLORHEXIDINE GLUCONATE 15 ML: 1.2 RINSE ORAL at 20:53

## 2024-09-22 RX ADMIN — KETOROLAC TROMETHAMINE 15 MG: 30 INJECTION, SOLUTION INTRAMUSCULAR; INTRAVENOUS at 00:11

## 2024-09-22 RX ADMIN — AMPICILLIN AND SULBACTAM 3 G: 10; 5 INJECTION, POWDER, FOR SOLUTION INTRAVENOUS at 06:23

## 2024-09-22 RX ADMIN — CHLORHEXIDINE GLUCONATE 15 ML: 1.2 RINSE ORAL at 08:13

## 2024-09-22 NOTE — UTILIZATION REVIEW
Initial Clinical Review    Admission: Date/Time/Statement:   Admission Orders (From admission, onward)       Ordered        09/21/24 1457  INPATIENT ADMISSION  Once                          Orders Placed This Encounter   Procedures    INPATIENT ADMISSION     Standing Status:   Standing     Number of Occurrences:   1     Order Specific Question:   Level of Care     Answer:   Level 1 Stepdown [13]     Order Specific Question:   Estimated length of stay     Answer:   More than 2 Midnights     Order Specific Question:   Certification     Answer:   I certify that inpatient services are medically necessary for this patient for a duration of greater than two midnights. See H&P and MD Progress Notes for additional information about the patient's course of treatment.     ED Arrival Information       Expected   9/21/2024     Arrival   9/21/2024 09:50    Acuity   Emergent              Means of arrival   Walk-In    Escorted by   Family Member    Service   Critical Care/ICU    Admission type   Emergency              Arrival complaint   Acute intractable headache, unspecified headache type             Chief Complaint   Patient presents with    Headache     Pt reports headache since last night. Pt also noticied increased swelling and pain on R side of neck jaw/ Pt got 30 of toradol at [facility] before coming here to alleviate pain, pt unable to open mouth.        Initial Presentation: 74 y.o. male  to ED via walk in from home.    Admitted to inpatient with Dx: Mandibular Abscess.  Presented to ED with neck swelling and pain starting about 1 day prior to arrival,  + headache and today symptoms progressed to odynophagia, dysphagia, trismus, muffled voice, jaw claudication.  About 2 weeks ago right lower molar crown.   PMHx: BPH, kidney stone, Migraines, BENI.   On exam:  Trismus; uvular deviated to the left; significant tongue elevation; oropharynx mildly obscured by tongue. sternocleidomastoid tenderness and edema on right.  Wbc 14.80.     Imaging shows  abscess with distal extension.   ED treatment:  IVF bolus, IV acetaminophen, Reglan, Benadryl and Decadron. Started on Unasyn.    Plan includes to admit to SD1 for airway watch.  Trend CBC, fever curve and Procalcitonin.  Continue Unasyn and Decadron.  Pain control and schedule IV Toradol.  Consult OMFS and ENT. Npo.      9/21/24 per OMFS -  right pterygomandibular phlegmon vs neoplasm with deviation of airway.  Had crown prep done 9/10 and 10 days later developed pain, swelling, voice change and odynophagia. CT scans reviewed. Infectious phlegmon/developing abscess noted.  Plan is repeat ct soft tissue neck.  If drainable collection then OR.  Continue antibiotics, Peridex.  Warm compress to right side of face.      9/21/24 per ENT- R  space, oropharyngeal, hypopharyngeal swelling.  Airway patent.  Plan is at least 48 to 72 hours of antibiotics.   Potential surgery with OMFS.  No acute ENT intervention.     Date: 9/22/24    Day 2: today feels better than admission.   Trismus improving.  + sore throat.   Flexible laryngoscopy performed yesterday, unremarkable-no acute findings besides right lateral hypopharyngeal wall swelling extending to the right arytenoid region, midline postcricoid region.  On exam:   Moderate trismus with 2 cm of excursion. No erythema of the oropharynx.  Wbc 15.88.   continue Unasyn and Decadron.       ED Triage Vitals [09/21/24 1001]   Temperature Pulse Respirations Blood Pressure SpO2 Pain Score   98.1 °F (36.7 °C) 64 16 128/93 99 % 6     Weight (last 2 days)       Date/Time Weight    09/21/24 1612 92.9 (204.81)          Vital Signs (last 3 days)       Date/Time Temp Pulse Resp BP MAP (mmHg) SpO2 O2 Device Patient Position - Orthostatic VS Guevara Coma Scale Score Pain    09/22/24 1100 99 °F (37.2 °C) 58 15 113/66 83 97 % None (Room air) Lying -- --    09/22/24 1000 -- 59 21 127/72 95 98 % -- -- -- --    09/22/24 0900 -- 62 17 90/53 66 97 %  -- -- -- --    09/22/24 0800 -- 54 13 106/65 81 97 % -- -- 15 No Pain    09/22/24 0700 98.4 °F (36.9 °C) 62 15 102/63 77 98 % None (Room air) Lying -- --    09/22/24 0622 -- -- -- -- -- -- -- -- -- 1    09/22/24 0600 -- 57 15 92/51 66 96 % -- -- -- --    09/22/24 0500 -- 52 13 97/62 74 97 % -- -- -- --    09/22/24 0400 -- 53 12 91/53 67 98 % -- -- 15 --    09/22/24 0300 -- 52 10 94/51 67 97 % -- -- -- --    09/22/24 0200 -- 54 12 99/55 69 95 % -- -- -- --    09/22/24 0100 -- 52 12 93/53 64 96 % -- -- -- --    09/22/24 0011 -- -- -- -- -- -- -- -- -- 2    09/22/24 0000 -- 54 13 104/62 73 97 % -- -- 15 --    09/21/24 2300 -- 61 19 99/58 72 95 % -- -- -- --    09/21/24 2200 -- 56 20 93/59 72 96 % -- -- -- --    09/21/24 2000 -- -- -- -- -- -- -- -- 15 --    09/21/24 1930 -- 63 18 101/63 78 97 % -- -- -- --    09/21/24 1926 98.3 °F (36.8 °C) 72 18 101/63 78 96 % None (Room air) -- -- No Pain    09/21/24 1650 -- -- -- -- -- -- -- -- 15 No Pain    09/21/24 1612 97.9 °F (36.6 °C) 65 16 103/70 82 96 % None (Room air) -- -- No Pain    09/21/24 1500 -- 67 -- 108/72 84 95 % -- -- -- --    09/21/24 1430 -- -- -- 104/58 75 96 % None (Room air) -- -- No Pain    09/21/24 1330 -- 68 -- 96/60 74 96 % -- -- -- --    09/21/24 1245 -- 73 -- 116/74 90 95 % -- -- -- --    09/21/24 1100 -- 77 -- -- -- 98 % -- -- -- --    09/21/24 1001 98.1 °F (36.7 °C) 64 16 128/93 107 99 % None (Room air) -- -- 6            Pertinent Labs/Diagnostic Test Results:   Radiology:  CT soft tissue neck   Final Interpretation by Sanket Green MD (09/21 9282)   1. Masslike focus arising from the right  space with mass effect on the lower nasopharynx, oropharynx, and even inferiorly to the hypopharynx. There is associated inflammatory or infiltrative mucosal thickening involving the right posterior    aspect of the oropharynx, also.      Given acute onset with recent history of dental intervention, infectious phlegmon/developing abscess is favored,  though neoplasm is also a differential consideration.      Recommend ENT consultation for direct visualization.      2.  Small amount of fluid in the retropharyngeal danger space to just below the hyoid. No extension into the mediastinum.         I personally discussed this study with Cedric Porter on 9/21/2024 12:33 PM.            Workstation performed: [id number]         CT head without contrast   Final Interpretation by Sanket Green MD (09/21 1204)      1.  Small low density, chronic-appearing subdural hygroma overlying the right frontal convexity. No significant associated mass effect.      2.  No acute extra-axial collections or acute brain parenchymal abnormalities.         The study was marked in EPIC for immediate notification.               Workstation performed: [id number]             Results from last 7 days   Lab Units 09/22/24  0431 09/21/24  1820 09/21/24  1058   WBC Thousand/uL 15.88*  --  14.80*   HEMOGLOBIN g/dL 12.4  --  14.0   HEMATOCRIT % 37.3  --  41.5   PLATELETS Thousands/uL 210 216 240   TOTAL NEUT ABS Thousands/µL 13.15*  --  12.37*     Results from last 7 days   Lab Units 09/22/24  0431 09/21/24  1058   SODIUM mmol/L 138 138   POTASSIUM mmol/L 3.9 3.9   CHLORIDE mmol/L 109* 107   CO2 mmol/L 22 23   ANION GAP mmol/L 7 8   BUN mg/dL 17 14   CREATININE mg/dL 0.93 0.87   EGFR ml/min/1.73sq m 81 86   CALCIUM mg/dL 8.7 9.2     Results from last 7 days   Lab Units 09/21/24  1058   AST U/L 15   ALT U/L 13   ALK PHOS U/L 71   TOTAL PROTEIN g/dL 7.1   ALBUMIN g/dL 4.1   TOTAL BILIRUBIN mg/dL 1.14*     Results from last 7 days   Lab Units 09/22/24  0431 09/21/24  1058   GLUCOSE RANDOM mg/dL 128 118     Results from last 7 days   Lab Units 09/21/24  1058   PROCALCITONIN ng/ml <0.05       ED Treatment-Medication Administration from 09/21/2024 0911 to 09/21/2024 1638         Date/Time Order Dose Route Action     09/21/2024 1100 acetaminophen (Ofirmev) injection 1,000 mg 1,000 mg Intravenous New Bag      09/21/2024 1059 sodium chloride 0.9 % bolus 1,000 mL 1,000 mL Intravenous New Bag     09/21/2024 1100 metoclopramide (REGLAN) injection 10 mg 10 mg Intravenous Given     09/21/2024 1100 diphenhydrAMINE (BENADRYL) injection 25 mg 25 mg Intravenous Given     09/21/2024 1100 dexamethasone (PF) (DECADRON) injection 10 mg 10 mg Intravenous Given     09/21/2024 1243 ampicillin-sulbactam (UNASYN) 3 g in sodium chloride 0.9 % 100 mL IVPB 3 g Intravenous New Bag     09/21/2024 1439 sodium chloride 0.9 % infusion 150 mL/hr Intravenous New Bag            Past Medical History:   Diagnosis Date    BPH (benign prostatic hyperplasia)     Kidney stone     isaias    Migraines      Present on Admission:   BENI (obstructive sleep apnea)      Admitting Diagnosis: Abscess of  space of mouth [K12.2]  Headache [R51.9]  Age/Sex: 74 y.o. male  Admission Orders:  Scheduled Medications:  ampicillin-sulbactam, 3 g, Intravenous, Q6H  chlorhexidine, 15 mL, Mouth/Throat, Q12H MARILYN  dexamethasone, 4 mg, Intravenous, Daily  enoxaparin, 40 mg, Subcutaneous, Daily    ketorolac (TORADOL) injection 15 mg  Dose: 15 mg  Freq: Every 6 hours scheduled Route: IV  Start: 09/21/24 1800 End: 09/22/24 0750   pantoprazole (PROTONIX) injection 40 mg  Dose: 40 mg  Freq: Once Route: IV  Start: 09/21/24 1900 End: 09/21/24 1917    Continuous IV Infusions:sodium chloride 0.9 % infusion  Rate: 150 mL/hr Dose: 150 mL/hr  Freq: Continuous Route: IV  Indications of Use: IV Hydration  Last Dose: Stopped (09/21/24 1819)  Start: 09/21/24 1415 End: 09/21/24 1812     PRN Meds:  polyethylene glycol, 17 g, Oral, Daily PRN    Cardio pulmonary monitoring  NPO    IP CONSULT TO ORAL AND MAXILLOFACIAL SURGERY  IP CONSULT TO ENT      Network Utilization Review Department  ATTENTION: Please call with any questions or concerns to [phone] and carefully listen to the prompts so that you are directed to the right person. All voicemails are confidential.   For Discharge needs,  contact Care Management DC Support Team at [phone] opt. 2  Send all requests for admission clinical reviews, approved or denied determinations and any other requests to dedicated fax number below belonging to the campus where the patient is receiving treatment. List of dedicated fax numbers for the Facilities:  FACILITY NAME UR FAX NUMBER   ADMISSION DENIALS (Administrative/Medical Necessity) [phone]   DISCHARGE SUPPORT TEAM (NETWORK) [phone]   PARENT CHILD HEALTH (Maternity/NICU/Pediatrics) [phone]   [facility] [phone]   [facility] [phone]   [facility] [phone]   [facility] [phone]   [facility] [phone]   [facility] [phone]   [facility] [phone]   [facility] [phone]   [facility] [phone]   [facility] [phone]   [facility] [phone]   [facility] [phone]

## 2024-09-22 NOTE — PROGRESS NOTES
Progress Note - Critical Care/ICU   Name: Cody Fenton 74 y.o. male I MRN: [id number]  Unit/Bed#: ICU 01 I Date of Admission: 9/21/2024   Date of Service: 9/22/2024 I Hospital Day: 1      Assessment & Plan  Mandibular abscess  Presents with 3 day history of pain prior to admission 9/21 in right lower jaw associated with swelling and difficulty opening mouth  Recently had had dental crown placement due to fractured lower right first molar, 2 weeks prior to onset of symptoms, denies tooth extraction or manipulation of teeth/gum/jaw. No abx given at that time.   Peak swelling was night prior to presentation, has not gotten worse.  Was administered decadron and toradol at outpatient urgent care with improvement in pain and swelling.  Denies fever, chills, difficulty breathing, wheezing.  Complains of some difficulty swallowing foods and slurred speech  Afebrile, heart rate wnl, RR wnl, saturating well on room air.  Does not meet sepsis criteria  Physical exam positive for swelling and tenderness to palpation at angle of right mandible.  Mild erythema present  Recent Labs     09/21/24  1058 09/22/24  0431   WBC 14.80* 15.88*     Procalcitonin within normal range  CT soft tissue head and neck 9/21: Mass adjacent to or arising from medial pterygoid. Masslike focus arising from the right  space with mass effect on the lower nasopharynx, oropharynx, and even inferiorly to the hypopharynx. There is associated inflammatory or infiltrative mucosal thickening involving the right posterior aspect of the oropharynx.  Developing abscess/infectious phlegmon favored.  Malignancy cannot be excluded. Mass defect resulting in leftward displacement of the epiglottis.  Small amount of fluid in the retropharyngeal danger space to just below the hyoid.  No extension into the mediastinum  Administered decadron and Unasyn 3g in the ED with continued improvement in symptoms.      9/21 flexible laryngoscopy performed.  Intact  bilateral cords both abduction and adduction, possible mild glottic gap.  Right lateral hypopharyngeal wall swelling, extending to the right arytenoid region, midline postcricoid region.  Laryngeal erythema noted on the right side.    Plan:  Admitted to SD1 for airway watch, doing well from that standpoint.  Consider transferring from unit today pending clinical status.  Trend CBC, fever curve, no signs of systemic infection at this point  Continue unasyn 3 g q6h  Continue IV decadron 4 mg qd  OMFS and ENT consulted, Recommendations are appreciated  NPO for probable surgical intervention, will defer to OMS/ENT, if no acute surgical intervention will have speech assess  Lovenox 40 mg qd for DVT prophylaxis  UOOB as tolerated    BENI (obstructive sleep apnea)  Has declined treatment in past  CPAP qhs ordered and encouraged to be used while inpatient  Disposition: Will discuss on rounds    ICU Core Measures     A: Assess, Prevent, and Manage Pain Has pain been assessed? Yes  Need for changes to pain regimen? No   B: Both SAT/SAT  N/A   C: Choice of Sedation RASS Goal: 0 Alert and Calm or N/A patient not on sedation  Need for changes to sedation or analgesia regimen? No   D: Delirium CAM-ICU: Negative   E: Early Mobility  Plan for early mobility? Yes   F: Family Engagement Plan for family engagement today? Yes       Antibiotic Review: Continue broad spectrum secondary to severity of illness.       Prophylaxis:  VTE VTE covered by:  enoxaparin, Subcutaneous       Stress Ulcer  not ordered         24 Hour Events   24hr events: Patient denies any pain, headache, shortness of breath, dysphagia, difficulty tolerating his secretions, fever, chills, nausea, chest pain.  He is n.p.o.  He remains on Unasyn and Decadron.  He was seen by ENT and OMS yesterday.  No acute surgical interventions mentioned as of yesterday.  Flexible laryngoscopy performed yesterday, unremarkable-no acute findings besides right lateral hypopharyngeal  wall swelling extending to the right arytenoid region, midline postcricoid region.    Vitals:  BP: /58-74  HR: 54-77  RR: 13-20  97% on RA    I/O: 1.5 L in, no recorded output    Access: peripheral IV    Abx: unasyn    Subjective   Review of Systems: See HPI for Review of Systems    Objective                          Vitals I/O      Most Recent Min/Max in 24hrs   Temp 98.3 °F (36.8 °C) Temp  Min: 97.9 °F (36.6 °C)  Max: 98.3 °F (36.8 °C)   Pulse (!) 54 Pulse  Min: 54  Max: 77   Resp 13 Resp  Min: 13  Max: 20   /62 BP  Min: 93/59  Max: 159/88   O2 Sat 97 % SpO2  Min: 95 %  Max: 99 %      Intake/Output Summary (Last 24 hours) at 9/22/2024 0634  Last data filed at 9/21/2024 2001  Gross per 24 hour   Intake 1502.5 ml   Output --   Net 1502.5 ml       Diet NPO    Invasive Monitoring           Physical Exam   Physical Exam  Eyes:      Conjunctiva/sclera: Conjunctivae normal.   Skin:     General: Skin is warm and dry.   HENT:      Head: Normocephalic.      Nose: No congestion or rhinorrhea.      Mouth/Throat:      Mouth: Mucous membranes are moist.      Dentition: Abnormal dentition.   Neck:      Comments: Right mandibular swelling, soft, nontender  Cardiovascular:      Rate and Rhythm: Normal rate and regular rhythm.      Comments: No mediastinal crepitus  Musculoskeletal:      Right lower leg: No edema.      Left lower leg: No edema.   Abdominal:      Palpations: Abdomen is soft.      Tenderness: There is no abdominal tenderness. There is no guarding.   Constitutional:       General: He is not in acute distress.     Appearance: He is not ill-appearing.   Pulmonary:      Effort: Pulmonary effort is normal. No respiratory distress.      Breath sounds: Normal breath sounds.      Comments: Conversing well without any pain or difficulty  Neurological:      General: No focal deficit present.      Mental Status: He is alert. Mental status is at baseline.          Diagnostic Studies        Lab Results: I have  reviewed the following results: CBC/BMP:   .     09/22/24  0431   WBC 15.88*   HGB 12.4   HCT 37.3      SODIUM 138   K 3.9   *   CO2 22   BUN 17   CREATININE 0.93   GLUC 128         Medications:  Scheduled PRN   ampicillin-sulbactam, 3 g, Q6H  chlorhexidine, 15 mL, Q12H MARILYN  dexamethasone, 4 mg, Daily  enoxaparin, 40 mg, Daily  ketorolac, 15 mg, Q6H MARILYN          Continuous          Labs:   CBC    Recent Labs     09/21/24  1058 09/21/24  1820 09/22/24  0431   WBC 14.80*  --  15.88*   HGB 14.0  --  12.4   HCT 41.5  --  37.3    216 210     BMP    Recent Labs     09/21/24  1058 09/22/24  0431   SODIUM 138 138   K 3.9 3.9    109*   CO2 23 22   AGAP 8 7   BUN 14 17   CREATININE 0.87 0.93   CALCIUM 9.2 8.7       Coags    No recent results     Additional Electrolytes  No recent results       Blood Gas    No recent results  No recent results LFTs  Recent Labs     09/21/24  1058   ALT 13   AST 15   ALKPHOS 71   ALB 4.1   TBILI 1.14*       Infectious  Recent Labs     09/21/24  1058   PROCALCITONI <0.05     Glucose  Recent Labs     09/21/24  1058 09/22/24  0431   GLUC 118 128

## 2024-09-22 NOTE — PLAN OF CARE
Problem: PAIN - ADULT  Goal: Verbalizes/displays adequate comfort level or baseline comfort level  Description: Interventions:  - Encourage patient to monitor pain and request assistance  - Assess pain using appropriate pain scale  - Administer analgesics based on type and severity of pain and evaluate response  - Implement non-pharmacological measures as appropriate and evaluate response  - Consider cultural and social influences on pain and pain management  - Notify physician/advanced practitioner if interventions unsuccessful or patient reports new pain  Outcome: Progressing     Problem: INFECTION - ADULT  Goal: Absence or prevention of progression during hospitalization  Description: INTERVENTIONS:  - Assess and monitor for signs and symptoms of infection  - Monitor lab/diagnostic results  - Monitor all insertion sites, i.e. indwelling lines, tubes, and drains  - Monitor endotracheal if appropriate and nasal secretions for changes in amount and color  - [place] appropriate cooling/warming therapies per order  - Administer medications as ordered  - Instruct and encourage patient and family to use good hand hygiene technique  - Identify and instruct in appropriate isolation precautions for identified infection/condition  Outcome: Progressing  Goal: Absence of fever/infection during neutropenic period  Description: INTERVENTIONS:  - Monitor WBC    Outcome: Progressing     Problem: SAFETY ADULT  Goal: Patient will remain free of falls  Description: INTERVENTIONS:  - Educate patient/family on patient safety including physical limitations  - Instruct patient to call for assistance with activity   - Consult OT/PT to assist with strengthening/mobility   - Keep Call bell within reach  - Keep bed low and locked with side rails adjusted as appropriate  - Keep care items and personal belongings within reach  - Initiate and maintain comfort rounds  - Make Fall Risk Sign visible to staff  - Apply yellow socks and bracelet  for high fall risk patients  - Consider moving patient to room near nurses station  Outcome: Progressing  Goal: Maintain or return to baseline ADL function  Description: INTERVENTIONS:  -  Assess patient's ability to carry out ADLs; assess patient's baseline for ADL function and identify physical deficits which impact ability to perform ADLs (bathing, care of mouth/teeth, toileting, grooming, dressing, etc.)  - Assess/evaluate cause of self-care deficits   - Assess range of motion  - Assess patient's mobility; develop plan if impaired  - Assess patient's need for assistive devices and provide as appropriate  - Encourage maximum independence but intervene and supervise when necessary  - Involve family in performance of ADLs  - Assess for home care needs following discharge   - Consider OT consult to assist with ADL evaluation and planning for discharge  - Provide patient education as appropriate  Outcome: Progressing  Goal: Maintains/Returns to pre admission functional level  Description: INTERVENTIONS:  - Perform AM-PAC 6 Click Basic Mobility/ Daily Activity assessment daily.  - Set and communicate daily mobility goal to care team and patient/family/caregiver.   - Collaborate with rehabilitation services on mobility goals if consulted  - Out of bed for toileting  - Record patient progress and toleration of activity level   Outcome: Progressing     Problem: DISCHARGE PLANNING  Goal: Discharge to home or other facility with appropriate resources  Description: INTERVENTIONS:  - Identify barriers to discharge w/patient and caregiver  - Arrange for needed discharge resources and transportation as appropriate  - Identify discharge learning needs (meds, wound care, etc.)  - Arrange for interpretive services to assist at discharge as needed  - Refer to Case Management Department for coordinating discharge planning if the patient needs post-hospital services based on physician/advanced practitioner order or complex needs  related to functional status, cognitive ability, or social support system  Outcome: Progressing     Problem: Knowledge Deficit  Goal: Patient/family/caregiver demonstrates understanding of disease process, treatment plan, medications, and discharge instructions  Description: Complete learning assessment and assess knowledge base.  Interventions:  - Provide teaching at level of understanding  - Provide teaching via preferred learning methods  Outcome: Progressing

## 2024-09-22 NOTE — PROGRESS NOTES
Progress Note - ENT   Name: Cody Fenton 74 y.o. male I MRN: [id number]  Unit/Bed#: ICU 01 I Date of Admission: 9/21/2024   Date of Service: 9/22/2024 I Hospital Day: 1     Assessment & Plan  Mandibular abscess    Pt improving. Discussed with OMS. They feel this is likely due to previous dental work and will consider drainage if he is not adequately improving.   BENI (obstructive sleep apnea)      History of Present Illness   Chief Complaint: sore throat  Subjective: Pt doing better today. Trismus improving.     Objective      Temp:  [97.9 °F (36.6 °C)-98.4 °F (36.9 °C)] 98.4 °F (36.9 °C)  HR:  [52-73] 59  Resp:  [10-21] 21  BP: ()/(51-74) 127/72  O2 Device: None (Room air)          I/O last 24 hours:  In: 1502.5 [I.V.:202.5; IV Piggyback:1300]  Out: -   Lines/Drains/Airways       Active Status       Name Placement date Placement time Site Days    Ureteral Drain/Stent Left ureter 6 Fr. 11/19/20  1034  Left ureter  1402                  Physical Exam Physical Exam   Constitutional: Oriented to person, place, and time. Well-developed and well-nourished, no apparent distress, non-toxic appearance. Cooperative, able to hear and answer questions without difficulty.    Voice: Normal voice quality.  Head: Normocephalic, atraumatic.  No scars, masses or lesions.  Face: Symmetric, no edema. No obvious parotid or submandibular masses.   Eyes: Vision grossly intact, extra-ocular movement intact.  Ears: External ear normal.  No visible masses or lesions.  Nose: Normal dorsal form without obvious masses. No crusting, polyps or discharge evident on external inspection.  Oral cavity:  No masses or lesions of the lips  Neck: Normal laryngeal elevation with swallow.  Trachea midline.  No masses or lesions visible.  Pulmonary/Chest: Normal effort and rate. No respiratory distress. No audible stertor or stridor.  Neurological: Vision grossly normal, CN2 -12 intact  Skin: Skin does not demonstrate any lesions.   Psychiatric:  "Normal mood and affect.    Abnormal findings: Moderate trismus with 2 cm of excursion. No erythema of the oropharynx.       Lab Results: I have reviewed the following results: CBC/BMP:   .     09/22/24  0431   WBC 15.88*   HGB 12.4   HCT 37.3      SODIUM 138   K 3.9   *   CO2 22   BUN 17   CREATININE 0.93   GLUC 128    , PTT/INR:No new results in last 24 hours. , Lactic Acid: No new results in last 24 hours. , Procalcitonin: No results found for: \"PROCALCITONI\"  Imaging Review: Personally reviewed the following image studies in PACS and associated radiology reports: CT soft tissue neck. My interpretation of the radiology images/reports is: abscess or possible blood in the  space..  Other Studies: No additional pertinent studies reviewed.    VTE Pharmacologic Prophylaxis: VTE covered by:  enoxaparin, Subcutaneous, 40 mg at 09/22/24 0882     VTE Mechanical Prophylaxis: sequential compression device  "

## 2024-09-22 NOTE — ASSESSMENT & PLAN NOTE
Pt improving. Discussed with OMS. They feel this is likely due to previous dental work and will consider drainage if he is not adequately improving.

## 2024-09-22 NOTE — ASSESSMENT & PLAN NOTE
Presents with 3 day history of pain prior to admission 9/21 in right lower jaw associated with swelling and difficulty opening mouth  Recently had had dental crown placement due to fractured lower right first molar, 2 weeks prior to onset of symptoms, denies tooth extraction or manipulation of teeth/gum/jaw. No abx given at that time.   Peak swelling was night prior to presentation, has not gotten worse.  Was administered decadron and toradol at outpatient urgent care with improvement in pain and swelling.  Denies fever, chills, difficulty breathing, wheezing.  Complains of some difficulty swallowing foods and slurred speech  Afebrile, heart rate wnl, RR wnl, saturating well on room air.  Does not meet sepsis criteria  Physical exam positive for swelling and tenderness to palpation at angle of right mandible.  Mild erythema present  Recent Labs     09/21/24  1058 09/22/24  0431   WBC 14.80* 15.88*     Procalcitonin within normal range  CT soft tissue head and neck 9/21: Mass adjacent to or arising from medial pterygoid. Masslike focus arising from the right  space with mass effect on the lower nasopharynx, oropharynx, and even inferiorly to the hypopharynx. There is associated inflammatory or infiltrative mucosal thickening involving the right posterior aspect of the oropharynx.  Developing abscess/infectious phlegmon favored.  Malignancy cannot be excluded. Mass defect resulting in leftward displacement of the epiglottis.  Small amount of fluid in the retropharyngeal danger space to just below the hyoid.  No extension into the mediastinum  Administered decadron and Unasyn 3g in the ED with continued improvement in symptoms.      9/21 flexible laryngoscopy performed.  Intact bilateral cords both abduction and adduction, possible mild glottic gap.  Right lateral hypopharyngeal wall swelling, extending to the right arytenoid region, midline postcricoid region.  Laryngeal erythema noted on the right  side.    Plan:  Admitted to SD1 for airway watch, doing well from that standpoint.  Consider transferring from unit today pending clinical status.  Trend CBC, fever curve, no signs of systemic infection at this point  Continue unasyn 3 g q6h  Continue IV decadron 4 mg qd  OMFS and ENT consulted, Recommendations are appreciated  NPO for probable surgical intervention, will defer to OMS/ENT, if no acute surgical intervention will have speech assess  Lovenox 40 mg qd for DVT prophylaxis  UOOB as tolerated

## 2024-09-23 ENCOUNTER — ANESTHESIA (INPATIENT)
Dept: PERIOP | Facility: HOSPITAL | Age: 74
DRG: 138 | End: 2024-09-23
Payer: COMMERCIAL

## 2024-09-23 ENCOUNTER — ANESTHESIA EVENT (INPATIENT)
Dept: PERIOP | Facility: HOSPITAL | Age: 74
DRG: 138 | End: 2024-09-23
Payer: COMMERCIAL

## 2024-09-23 LAB
ALBUMIN SERPL BCG-MCNC: 3.6 G/DL (ref 3.5–5)
ALP SERPL-CCNC: 52 U/L (ref 34–104)
ALT SERPL W P-5'-P-CCNC: 12 U/L (ref 7–52)
ANION GAP SERPL CALCULATED.3IONS-SCNC: 6 MMOL/L (ref 4–13)
AST SERPL W P-5'-P-CCNC: 15 U/L (ref 13–39)
BASOPHILS # BLD AUTO: 0.04 THOUSANDS/ΜL (ref 0–0.1)
BASOPHILS NFR BLD AUTO: 0 % (ref 0–1)
BILIRUB SERPL-MCNC: 0.67 MG/DL (ref 0.2–1)
BUN SERPL-MCNC: 16 MG/DL (ref 5–25)
CALCIUM SERPL-MCNC: 8.8 MG/DL (ref 8.4–10.2)
CHLORIDE SERPL-SCNC: 106 MMOL/L (ref 96–108)
CO2 SERPL-SCNC: 26 MMOL/L (ref 21–32)
CREAT SERPL-MCNC: 0.9 MG/DL (ref 0.6–1.3)
EOSINOPHIL # BLD AUTO: 0.08 THOUSAND/ΜL (ref 0–0.61)
EOSINOPHIL NFR BLD AUTO: 1 % (ref 0–6)
ERYTHROCYTE [DISTWIDTH] IN BLOOD BY AUTOMATED COUNT: 13.8 % (ref 11.6–15.1)
GFR SERPL CREATININE-BSD FRML MDRD: 85 ML/MIN/1.73SQ M
GLUCOSE SERPL-MCNC: 104 MG/DL (ref 65–140)
HCT VFR BLD AUTO: 36.9 % (ref 36.5–49.3)
HGB BLD-MCNC: 12.1 G/DL (ref 12–17)
IMM GRANULOCYTES # BLD AUTO: 0.08 THOUSAND/UL (ref 0–0.2)
IMM GRANULOCYTES NFR BLD AUTO: 1 % (ref 0–2)
LYMPHOCYTES # BLD AUTO: 2.14 THOUSANDS/ΜL (ref 0.6–4.47)
LYMPHOCYTES NFR BLD AUTO: 15 % (ref 14–44)
MAGNESIUM SERPL-MCNC: 2 MG/DL (ref 1.9–2.7)
MCH RBC QN AUTO: 29.2 PG (ref 26.8–34.3)
MCHC RBC AUTO-ENTMCNC: 32.8 G/DL (ref 31.4–37.4)
MCV RBC AUTO: 89 FL (ref 82–98)
MONOCYTES # BLD AUTO: 1.14 THOUSAND/ΜL (ref 0.17–1.22)
MONOCYTES NFR BLD AUTO: 8 % (ref 4–12)
NEUTROPHILS # BLD AUTO: 10.96 THOUSANDS/ΜL (ref 1.85–7.62)
NEUTS SEG NFR BLD AUTO: 75 % (ref 43–75)
NRBC BLD AUTO-RTO: 0 /100 WBCS
PHOSPHATE SERPL-MCNC: 2.6 MG/DL (ref 2.3–4.1)
PLATELET # BLD AUTO: 230 THOUSANDS/UL (ref 149–390)
PMV BLD AUTO: 10.1 FL (ref 8.9–12.7)
POTASSIUM SERPL-SCNC: 4 MMOL/L (ref 3.5–5.3)
PROT SERPL-MCNC: 6.4 G/DL (ref 6.4–8.4)
RBC # BLD AUTO: 4.15 MILLION/UL (ref 3.88–5.62)
SODIUM SERPL-SCNC: 138 MMOL/L (ref 135–147)
WBC # BLD AUTO: 14.44 THOUSAND/UL (ref 4.31–10.16)

## 2024-09-23 PROCEDURE — 0CDXXZ0 EXTRACTION OF LOWER TOOTH, SINGLE, EXTERNAL APPROACH: ICD-10-PCS | Performed by: EMERGENCY MEDICINE

## 2024-09-23 PROCEDURE — 85025 COMPLETE CBC W/AUTO DIFF WBC: CPT

## 2024-09-23 PROCEDURE — 87075 CULTR BACTERIA EXCEPT BLOOD: CPT | Performed by: DENTIST

## 2024-09-23 PROCEDURE — 87070 CULTURE OTHR SPECIMN AEROBIC: CPT | Performed by: DENTIST

## 2024-09-23 PROCEDURE — 84100 ASSAY OF PHOSPHORUS: CPT

## 2024-09-23 PROCEDURE — 87205 SMEAR GRAM STAIN: CPT | Performed by: DENTIST

## 2024-09-23 PROCEDURE — 83735 ASSAY OF MAGNESIUM: CPT

## 2024-09-23 PROCEDURE — 87147 CULTURE TYPE IMMUNOLOGIC: CPT | Performed by: DENTIST

## 2024-09-23 PROCEDURE — 0W950ZZ DRAINAGE OF LOWER JAW, OPEN APPROACH: ICD-10-PCS | Performed by: EMERGENCY MEDICINE

## 2024-09-23 PROCEDURE — 99232 SBSQ HOSP IP/OBS MODERATE 35: CPT | Performed by: EMERGENCY MEDICINE

## 2024-09-23 PROCEDURE — 80053 COMPREHEN METABOLIC PANEL: CPT

## 2024-09-23 RX ORDER — FENTANYL CITRATE 50 UG/ML
INJECTION, SOLUTION INTRAMUSCULAR; INTRAVENOUS AS NEEDED
Status: DISCONTINUED | OUTPATIENT
Start: 2024-09-23 | End: 2024-09-23

## 2024-09-23 RX ORDER — DEXAMETHASONE SODIUM PHOSPHATE 10 MG/ML
INJECTION, SOLUTION INTRAMUSCULAR; INTRAVENOUS AS NEEDED
Status: DISCONTINUED | OUTPATIENT
Start: 2024-09-23 | End: 2024-09-23

## 2024-09-23 RX ORDER — PROPOFOL 10 MG/ML
INJECTION, EMULSION INTRAVENOUS AS NEEDED
Status: DISCONTINUED | OUTPATIENT
Start: 2024-09-23 | End: 2024-09-23

## 2024-09-23 RX ORDER — SUCCINYLCHOLINE/SOD CL,ISO/PF 100 MG/5ML
SYRINGE (ML) INTRAVENOUS AS NEEDED
Status: DISCONTINUED | OUTPATIENT
Start: 2024-09-23 | End: 2024-09-23

## 2024-09-23 RX ORDER — ALBUTEROL SULFATE 0.83 MG/ML
2.5 SOLUTION RESPIRATORY (INHALATION) ONCE AS NEEDED
Status: DISCONTINUED | OUTPATIENT
Start: 2024-09-23 | End: 2024-09-23 | Stop reason: HOSPADM

## 2024-09-23 RX ORDER — KETOROLAC TROMETHAMINE 30 MG/ML
15 INJECTION, SOLUTION INTRAMUSCULAR; INTRAVENOUS EVERY 6 HOURS PRN
Status: DISCONTINUED | OUTPATIENT
Start: 2024-09-23 | End: 2024-09-23

## 2024-09-23 RX ORDER — KETOROLAC TROMETHAMINE 30 MG/ML
15 INJECTION, SOLUTION INTRAMUSCULAR; INTRAVENOUS EVERY 6 HOURS PRN
Status: DISCONTINUED | OUTPATIENT
Start: 2024-09-23 | End: 2024-09-24 | Stop reason: HOSPADM

## 2024-09-23 RX ORDER — SODIUM CHLORIDE, SODIUM LACTATE, POTASSIUM CHLORIDE, CALCIUM CHLORIDE 600; 310; 30; 20 MG/100ML; MG/100ML; MG/100ML; MG/100ML
INJECTION, SOLUTION INTRAVENOUS CONTINUOUS PRN
Status: DISCONTINUED | OUTPATIENT
Start: 2024-09-23 | End: 2024-09-23

## 2024-09-23 RX ORDER — LIDOCAINE HYDROCHLORIDE 10 MG/ML
INJECTION, SOLUTION EPIDURAL; INFILTRATION; INTRACAUDAL; PERINEURAL AS NEEDED
Status: DISCONTINUED | OUTPATIENT
Start: 2024-09-23 | End: 2024-09-23

## 2024-09-23 RX ORDER — ROCURONIUM BROMIDE 10 MG/ML
INJECTION, SOLUTION INTRAVENOUS AS NEEDED
Status: DISCONTINUED | OUTPATIENT
Start: 2024-09-23 | End: 2024-09-23

## 2024-09-23 RX ORDER — FENTANYL CITRATE/PF 50 MCG/ML
25 SYRINGE (ML) INJECTION
Status: DISCONTINUED | OUTPATIENT
Start: 2024-09-23 | End: 2024-09-23 | Stop reason: HOSPADM

## 2024-09-23 RX ORDER — LIDOCAINE HYDROCHLORIDE AND EPINEPHRINE 10; 10 MG/ML; UG/ML
INJECTION, SOLUTION INFILTRATION; PERINEURAL AS NEEDED
Status: DISCONTINUED | OUTPATIENT
Start: 2024-09-23 | End: 2024-09-23 | Stop reason: HOSPADM

## 2024-09-23 RX ORDER — ONDANSETRON 2 MG/ML
INJECTION INTRAMUSCULAR; INTRAVENOUS AS NEEDED
Status: DISCONTINUED | OUTPATIENT
Start: 2024-09-23 | End: 2024-09-23

## 2024-09-23 RX ADMIN — FENTANYL CITRATE 50 MCG: 50 INJECTION INTRAMUSCULAR; INTRAVENOUS at 18:07

## 2024-09-23 RX ADMIN — ROCURONIUM BROMIDE 5 MG: 10 INJECTION INTRAVENOUS at 18:42

## 2024-09-23 RX ADMIN — Medication 140 MG: at 18:08

## 2024-09-23 RX ADMIN — SODIUM CHLORIDE, SODIUM LACTATE, POTASSIUM CHLORIDE, AND CALCIUM CHLORIDE: .6; .31; .03; .02 INJECTION, SOLUTION INTRAVENOUS at 18:00

## 2024-09-23 RX ADMIN — DEXAMETHASONE SODIUM PHOSPHATE 10 MG: 10 INJECTION INTRAMUSCULAR; INTRAVENOUS at 18:23

## 2024-09-23 RX ADMIN — FENTANYL CITRATE 50 MCG: 50 INJECTION INTRAMUSCULAR; INTRAVENOUS at 18:22

## 2024-09-23 RX ADMIN — KETOROLAC TROMETHAMINE 15 MG: 30 INJECTION, SOLUTION INTRAMUSCULAR; INTRAVENOUS at 20:49

## 2024-09-23 RX ADMIN — ONDANSETRON 4 MG: 2 INJECTION INTRAMUSCULAR; INTRAVENOUS at 18:36

## 2024-09-23 RX ADMIN — AMPICILLIN AND SULBACTAM 3 G: 10; 5 INJECTION, POWDER, FOR SOLUTION INTRAVENOUS at 01:37

## 2024-09-23 RX ADMIN — CHLORHEXIDINE GLUCONATE 15 ML: 1.2 RINSE ORAL at 20:48

## 2024-09-23 RX ADMIN — CHLORHEXIDINE GLUCONATE 15 ML: 1.2 RINSE ORAL at 08:14

## 2024-09-23 RX ADMIN — AMPICILLIN AND SULBACTAM 3 G: 10; 5 INJECTION, POWDER, FOR SOLUTION INTRAVENOUS at 18:08

## 2024-09-23 RX ADMIN — KETOROLAC TROMETHAMINE 15 MG: 30 INJECTION, SOLUTION INTRAMUSCULAR; INTRAVENOUS at 11:10

## 2024-09-23 RX ADMIN — SUGAMMADEX 200 MG: 100 INJECTION, SOLUTION INTRAVENOUS at 18:52

## 2024-09-23 RX ADMIN — LIDOCAINE HYDROCHLORIDE 50 MG: 10 INJECTION, SOLUTION EPIDURAL; INFILTRATION; INTRACAUDAL at 18:07

## 2024-09-23 RX ADMIN — ROCURONIUM BROMIDE 30 MG: 10 INJECTION INTRAVENOUS at 18:10

## 2024-09-23 RX ADMIN — AMPICILLIN AND SULBACTAM 3 G: 10; 5 INJECTION, POWDER, FOR SOLUTION INTRAVENOUS at 06:29

## 2024-09-23 RX ADMIN — ENOXAPARIN SODIUM 40 MG: 40 INJECTION SUBCUTANEOUS at 08:14

## 2024-09-23 RX ADMIN — AMPICILLIN AND SULBACTAM 3 G: 10; 5 INJECTION, POWDER, FOR SOLUTION INTRAVENOUS at 12:00

## 2024-09-23 RX ADMIN — DEXAMETHASONE SODIUM PHOSPHATE 4 MG: 4 INJECTION INTRA-ARTICULAR; INTRALESIONAL; INTRAMUSCULAR; INTRAVENOUS; SOFT TISSUE at 08:13

## 2024-09-23 RX ADMIN — PROPOFOL 200 MG: 10 INJECTION, EMULSION INTRAVENOUS at 18:07

## 2024-09-23 RX ADMIN — PHENYLEPHRINE HYDROCHLORIDE 2 DROP: 1 SPRAY NASAL at 18:01

## 2024-09-23 NOTE — PLAN OF CARE
Problem: PAIN - ADULT  Goal: Verbalizes/displays adequate comfort level or baseline comfort level  Description: Interventions:  - Encourage patient to monitor pain and request assistance  - Assess pain using appropriate pain scale  - Administer analgesics based on type and severity of pain and evaluate response  - Implement non-pharmacological measures as appropriate and evaluate response  - Consider cultural and social influences on pain and pain management  - Notify physician/advanced practitioner if interventions unsuccessful or patient reports new pain  Outcome: Progressing     Problem: INFECTION - ADULT  Goal: Absence or prevention of progression during hospitalization  Description: INTERVENTIONS:  - Assess and monitor for signs and symptoms of infection  - Monitor lab/diagnostic results  - Monitor all insertion sites, i.e. indwelling lines, tubes, and drains  - Monitor endotracheal if appropriate and nasal secretions for changes in amount and color  - [place] appropriate cooling/warming therapies per order  - Administer medications as ordered  - Instruct and encourage patient and family to use good hand hygiene technique  - Identify and instruct in appropriate isolation precautions for identified infection/condition  Outcome: Progressing  Goal: Absence of fever/infection during neutropenic period  Description: INTERVENTIONS:  - Monitor WBC    Outcome: Progressing     Problem: SAFETY ADULT  Goal: Patient will remain free of falls  Description: INTERVENTIONS:  - Educate patient/family on patient safety including physical limitations  - Instruct patient to call for assistance with activity   - Consult OT/PT to assist with strengthening/mobility   - Keep Call bell within reach  - Keep bed low and locked with side rails adjusted as appropriate  - Keep care items and personal belongings within reach  - Initiate and maintain comfort rounds  - Make Fall Risk Sign visible to staff  - Apply yellow socks and bracelet  for high fall risk patients  - Consider moving patient to room near nurses station  Outcome: Progressing  Goal: Maintain or return to baseline ADL function  Description: INTERVENTIONS:  -  Assess patient's ability to carry out ADLs; assess patient's baseline for ADL function and identify physical deficits which impact ability to perform ADLs (bathing, care of mouth/teeth, toileting, grooming, dressing, etc.)  - Assess/evaluate cause of self-care deficits   - Assess range of motion  - Assess patient's mobility; develop plan if impaired  - Assess patient's need for assistive devices and provide as appropriate  - Encourage maximum independence but intervene and supervise when necessary  - Involve family in performance of ADLs  - Assess for home care needs following discharge   - Consider OT consult to assist with ADL evaluation and planning for discharge  - Provide patient education as appropriate  Outcome: Progressing  Goal: Maintains/Returns to pre admission functional level  Description: INTERVENTIONS:  - Perform AM-PAC 6 Click Basic Mobility/ Daily Activity assessment daily.  - Set and communicate daily mobility goal to care team and patient/family/caregiver.   - Collaborate with rehabilitation services on mobility goals if consulted  - Out of bed for toileting  - Record patient progress and toleration of activity level   Outcome: Progressing     Problem: DISCHARGE PLANNING  Goal: Discharge to home or other facility with appropriate resources  Description: INTERVENTIONS:  - Identify barriers to discharge w/patient and caregiver  - Arrange for needed discharge resources and transportation as appropriate  - Identify discharge learning needs (meds, wound care, etc.)  - Arrange for interpretive services to assist at discharge as needed  - Refer to Case Management Department for coordinating discharge planning if the patient needs post-hospital services based on physician/advanced practitioner order or complex needs  related to functional status, cognitive ability, or social support system  Outcome: Progressing     Problem: Knowledge Deficit  Goal: Patient/family/caregiver demonstrates understanding of disease process, treatment plan, medications, and discharge instructions  Description: Complete learning assessment and assess knowledge base.  Interventions:  - Provide teaching at level of understanding  - Provide teaching via preferred learning methods  Outcome: Progressing

## 2024-09-23 NOTE — ANESTHESIA PREPROCEDURE EVALUATION
Procedure:  INCISION AND DRAINAGE  (I&D) WOUND ORAL (Right: Face)    Relevant Problems   GI/HEPATIC   (+) Fatty liver      /RENAL   (+) Benign prostatic hyperplasia without lower urinary tract symptoms      PULMONARY   (+) BENI (obstructive sleep apnea)      Presents with 3 day history of pain in right lower jaw associated with swelling and difficulty opening mouth  Recently had had dental crown placement 2 weeks prior to onset of symptoms, denies tooth extraction or manipulation of teeth/gum/jaw.       Physical Exam    Airway    Mallampati score: III  TM Distance: <3 FB  Neck ROM: full     Dental   No notable dental hx     Cardiovascular  Cardiovascular exam normal    Pulmonary  Pulmonary exam normal     Other Findings        Anesthesia Plan  ASA Score- 2     Anesthesia Type- general with ASA Monitors.         Additional Monitors:     Airway Plan: ETT.           Plan Factors-Exercise tolerance (METS): >4 METS.    Chart reviewed.   Existing labs reviewed. Patient summary reviewed.    Patient is not a current smoker. Patient not instructed to abstain from smoking on day of procedure. Patient did not smoke on day of surgery.            Induction- intravenous.    Postoperative Plan- Plan for postoperative opioid use.     Perioperative Resuscitation Plan - Level 1 - Full Code.       Informed Consent- Anesthetic plan and risks discussed with patient and spouse.  I personally reviewed this patient with the CRNA. Discussed and agreed on the Anesthesia Plan with the CRNA..        Lab Results   Component Value Date    HGBA1C 5.7 06/21/2018       Lab Results   Component Value Date     08/19/2015    K 4.0 09/23/2024     09/23/2024    CO2 26 09/23/2024    ANIONGAP 9 08/19/2015    BUN 16 09/23/2024    CREATININE 0.90 09/23/2024    GLUCOSE 98 08/19/2015    GLUF 98 10/05/2023    CALCIUM 8.8 09/23/2024    CORRECTEDCA 9.6 11/10/2020    AST 15 09/23/2024    ALT 12 09/23/2024    ALKPHOS 52 09/23/2024    PROT 7.2  08/19/2015    BILITOT 0.63 08/19/2015    EGFR 85 09/23/2024       Lab Results   Component Value Date    WBC 14.44 (H) 09/23/2024    HGB 12.1 09/23/2024    HCT 36.9 09/23/2024    MCV 89 09/23/2024     09/23/2024     Sinus bradycardia with occasional Premature ventricular complexes  Nonspecific T wave abnormality  When compared with ECG of 13-NOV-2020 11:21,  Premature ventricular complexes are now Present  T wave inversion no longer evident in Lateral leads  Confirmed by Alessandro Ricks ([id number]) on 7/2/2021 7:17:10 AM      Specimen Collected: 07/01/21 09:10

## 2024-09-23 NOTE — OP NOTE
OPERATIVE REPORT  PATIENT NAME: Cody Fenton    :  1950  MRN: [id number]  Pt Location: AN OR ROOM 04    SURGERY DATE: 2024    Surgeons and Role:     * Vernon Saenz DMD - Primary    Preop Diagnosis:  Abscess of  space of mouth [K12.2]    Post-Op Diagnosis Codes:     * Abscess of  space of mouth [K12.2]    Procedure(s):  Removal complete bony impacted tooth #32  Intraoral incision and drainage right lateral pharyngeal space  Intraoral incision drainage right  space  Intraoral incision and drainage right submandibular space  Intraoral incision drainage right sublingual space  Intraoral incision drainage dentoalveolar structure associated with #32    Specimen(s):  Aerobic and anaerobic cultures    Estimated Blood Loss:   Minimal    Drains:  Ureteral Drain/Stent Left ureter 6 Fr. (Active)   Number of days: 1404       Anesthesia Type:   General    Operative Indications:  Abscess of  space of mouth [K12.2]      Operative Findings:  Significant purulent drainage obtained      Complications:   None    Procedure and Technique:  The patient was greeted in the preoperative area. All the risks and benefits of the procedure were once again explained and the risks of malocclusion, nonunion, malunion, pain ,bleeding, infection, swelling, permanent nerve dysfunction including lower chin and lip numbness were explained in detail all questions were answered. Consent had already been signed. Care was then handed back to the anesthesia team.    The patient was brought into the operating room by the anesthesia team and the patient was placed in a supine position where the patient remained for the rest of the case. Anesthesia was able to establish a nasotracheal intubation without any complications. Care was then handed back to the OMFS team.    Patient was draped in sterile manner timeout was performed in which the patient was correctly identified by name medical record number as  well as a site of the procedure be performed. Patient had received preoperative IV 3 g Ancef antibiotics. Once a timeout was completed oral cavity was thoroughly suctioned with the Yankauer suction the moist vaginal packing was used it as a throat pack. Patient was given local anesthesia at the sites of the fracture and IMF screws with 1% lidocaine with 1-100,000 epinephrine as local anesthesia per operating room record.     15 blade was used to incision in the left external bleak ridge.  Full-thickness flap elevated.  Tooth #32 had bone removed and sectioned and extracted.    15 blade was used to make an incision in the right lateral pharyngeal space.  Blunt dissection was informed to the lateral pharyngeal space.  Significant purulent drainage obtained.  Aerobic anaerobic cultures taken.  Through the pharyngeal incision blunt dissection was then performed to the right  and right submandibular spaces.    Periosteum sent from the bone and the lingual and facial aspects of the extraction site.  Blunt dissection was formed to the dentoalveolar space as well as the right sublingual space.  No additional purulent drainage was obtained.  Through and through dissection was performed through all of our surgical dissections.    Copious irrigation.  Surgical hemostasis.  I did not place a drain as I felt a drain would further complicate the patient's clinical course.  A foreign body reaction versus persistence of infection would potentially complicate his postoperative care.    Sponge and sharp count were verified     I was present for the entire procedure.    Patient Disposition:  PACU , hemodynamically stable, and extubated and stable          SIGNATURE: Vernon Saenz DMD  DATE: September 23, 2024  TIME: 6:35 PM

## 2024-09-23 NOTE — ANESTHESIA POSTPROCEDURE EVALUATION
Post-Op Assessment Note    CV Status:  Stable  Pain Score: 0    Pain management: adequate       Mental Status:  Alert and awake   Hydration Status:  Euvolemic and stable   PONV Controlled:  Controlled   Airway Patency:  Patent and adequate     Post Op Vitals Reviewed: Yes    No anethesia notable event occurred.    Staff: CRNA               BP  150/81    Temp 97.7   Pulse 67   Resp 18   SpO2 100% RA

## 2024-09-23 NOTE — UTILIZATION REVIEW
Continued Stay Review  SEE INITIAL REVIEW AT BOTTOM   Date: 9/23/24                           Current Patient Class: Inpatient  Current Level of Care: level 1 SD/ ICU    HPI:74 y.o. male initially admitted on 9/21/24  .M[place]ib[place]lar abscess    Assessment/Plan:   Date: 9/23   Day 3: Has surpassed a 2nd midnight with active treatments and services.  No acute overnight events.  Patient has been  NPO since MN for likely I&D by OMFS versus ENT.  Patient  denies any acute complaint.  Endorsed R sided facial swelling has remained about the same.  Able to manage secretions.On RA. Painful to swallow.   WBC down to 14.44 today. Pt remains on IV abx, IV steroids.Abx  likely will plan for total 10 days after I&D . Telemetry- SB . Pt able to be downgraded to level 2 SD. For CBC, BMP, Mag, phos in am .          Vital Signs (last 3 days)       Date/Time Temp Pulse Resp BP MAP (mmHg) SpO2 O2 Device Patient Position - Orthostatic VS Guevara Coma Scale Score Pain    09/23/24 0715 -- -- -- 132/78 99 -- -- -- 15 --    09/23/24 0700 98.9 °F (37.2 °C) -- 18 132/78 99 -- None (Room air) Lying -- --    09/23/24 0400 -- -- -- -- -- -- -- -- 15 No Pain    09/23/24 0139 -- 54 16 -- -- -- -- -- -- --    09/23/24 0138 -- -- -- 117/70 88 -- -- -- -- --    09/23/24 0000 -- -- -- -- -- -- -- -- 15 No Pain    09/22/24 2311 -- -- -- -- -- -- -- -- -- 4    09/22/24 2054 -- 57 20 123/71 92 -- -- -- -- --    09/22/24 2000 -- -- -- -- -- -- -- -- 15 No Pain    09/22/24 1700 98.6 °F (37 °C) -- -- 131/91 86 -- -- -- -- --    09/22/24 1600 -- -- -- -- -- -- -- -- 15 --    09/22/24 1500 -- 72 26 128/84 100 93 % -- -- -- --    09/22/24 1456 98.2 °F (36.8 °C) 70 22 128/84 100 100 % None (Room air) Lying -- --          Weight (last 2 days)       Date/Time Weight    09/23/24 0600 92.3 (203.48)    09/21/24 1612 92.9 (204.81)              Pertinent Labs/Diagnostic Results:   Radiology:  CT soft tissue neck w contrast   Final Interpretation by Nitish Eduardo MD (09/22  1544)      Redemonstration of an ill-defined collection likely arising from the right  space, as described above. Edema tracking along the right aerodigestive tract as well as extending into the right submandibular space appears overall improved and    findings again likely infectious in nature given the clinical history. Close continued interval follow-up is recommended to ensure resolution.            Workstation performed: [id number]         CT soft tissue neck   Final Interpretation by Sanket Green MD (09/21 0027)   1. Masslike focus arising from the right  space with mass effect on the lower nasopharynx, oropharynx, and even inferiorly to the hypopharynx. There is associated inflammatory or infiltrative mucosal thickening involving the right posterior    aspect of the oropharynx, also.      Given acute onset with recent history of dental intervention, infectious phlegmon/developing abscess is favored, though neoplasm is also a differential consideration.      Recommend ENT consultation for direct visualization.      2.  Small amount of fluid in the retropharyngeal danger space to just below the hyoid. No extension into the mediastinum.         I personally discussed this study with Cedric Porter on 9/21/2024 12:33 PM.            Workstation performed: [id number]         CT head without contrast   Final Interpretation by Sanket Green MD (09/21 6343)      1.  Small low density, chronic-appearing subdural hygroma overlying the right frontal convexity. No significant associated mass effect.      2.  No acute extra-axial collections or acute brain parenchymal abnormalities.         The study was marked in EPIC for immediate notification.               Workstation performed: [id number]           Cardiology:  No orders to display     GI:  No orders to display           Results from last 7 days   Lab Units 09/23/24  0445 09/22/24  0431 09/21/24  1820 09/21/24  1058   WBC Thousand/uL 14.44*  15.88*  --  14.80*   HEMOGLOBIN g/dL 12.1 12.4  --  14.0   HEMATOCRIT % 36.9 37.3  --  41.5   PLATELETS Thousands/uL 230 210 216 240   TOTAL NEUT ABS Thousands/µL 10.96* 13.15*  --  12.37*         Results from last 7 days   Lab Units 09/23/24 0445 09/22/24 0431 09/21/24  1058   SODIUM mmol/L 138 138 138   POTASSIUM mmol/L 4.0 3.9 3.9   CHLORIDE mmol/L 106 109* 107   CO2 mmol/L 26 22 23   ANION GAP mmol/L 6 7 8   BUN mg/dL 16 17 14   CREATININE mg/dL 0.90 0.93 0.87   EGFR ml/min/1.73sq m 85 81 86   CALCIUM mg/dL 8.8 8.7 9.2   MAGNESIUM mg/dL 2.0  --   --    PHOSPHORUS mg/dL 2.6  --   --      Results from last 7 days   Lab Units 09/23/24 0445 09/21/24  1058   AST U/L 15 15   ALT U/L 12 13   ALK PHOS U/L 52 71   TOTAL PROTEIN g/dL 6.4 7.1   ALBUMIN g/dL 3.6 4.1   TOTAL BILIRUBIN mg/dL 0.67 1.14*         Results from last 7 days   Lab Units 09/23/24 0445 09/22/24 0431 09/21/24  1058   GLUCOSE RANDOM mg/dL 104 128 118                   Results from last 7 days   Lab Units 09/21/24  1058   PROCALCITONIN ng/ml <0.05                 Medications:   Scheduled Medications:  ampicillin-sulbactam, 3 g, Intravenous, Q6H  chlorhexidine, 15 mL, Mouth/Throat, Q12H MARILYN  dexamethasone, 4 mg, Intravenous, Daily  enoxaparin, 40 mg, Subcutaneous, Daily    acetaminophen (TYLENOL) tablet 650 mg  Dose: 650 mg  Freq: Once Route: PO  Start: 09/22/24 2315 End: 09/22/24 2311  Continuous IV Infusions:     PRN Meds:  polyethylene glycol, 17 g, Oral, Daily PRN        Discharge Plan: TBD    Network Utilization Review Department  ATTENTION: Please call with any questions or concerns to [phone] and carefully listen to the prompts so that you are directed to the right person. All voicemails are confidential.   For Discharge needs, contact Care Management DC Support Team at [phone] opt. [phone]  Send all requests for admission clinical reviews, approved or denied determinations and any other requests to dedicated fax number below belonging to  the [facility] where the patient is receiving treatment. List of dedicated fax numbers for the Facilities:  FACILITY NAME UR FAX NUMBER   ADMISSION DENIALS (Administrative/Medical Necessity) [phone]   DISCHARGE SUPPORT TEAM (NETWORK) [phone]   PARENT CHILD HEALTH (Maternity/NICU/Pediatrics) [phone]   [facility] [phone]   [facility] [phone]   Critical access hospital [phone]   [facility] [phone]   [facility] [phone]   [facility] [phone]   [facility] [phone]   [facility] [phone]   [facility] [phone]   [facility] [phone]   [facility] [phone]   [facility] [phone]

## 2024-09-23 NOTE — PROGRESS NOTES
"Progress Note - Critical Care/ICU   Name: Cody Fenton 74 y.o. male I MRN: [id number]  Unit/Bed#: ICU 01 I Date of Admission: 9/21/2024   Date of Service: 9/23/2024 I Hospital Day: 2      Assessment & Plan  Mandibular abscess  POA presented with 3 day history of pain prior to admission 9/21 in right lower jaw associated with swelling and difficulty opening mouth.Recently had had dental crown placement due to fractured lower right first molar, 2 weeks prior to onset of symptoms, denies tooth extraction or manipulation of teeth/gum/jaw. No abx given at that time.  Prior to admission and was administered Decadron and Toradol with improvement in pain and swelling.  No fevers chills.  Did had difficulty swallowing and slurred speech  CT soft tissue head and neck on 09/21:\" Mass adjacent to and arising from medial pterygoid.  Masslike focus arising from the right mastoid space with mass effect on the lower nasopharynx, oropharynx, and even inferiorly to the hypopharynx. There is associated inflammatory or infiltrative mucosal thickening involving the right posterior aspect of the oropharynx.  Developing abscess/infectious phlegmon favored.  Malignancy cannot be excluded. Mass defect resulting in leftward displacement of the epiglottis.  Small amount of fluid in the retropharyngeal danger space to just below the hyoid.  No extension into the mediastinum\"  09/21 flexible laryngoscopy performed.  Intact bilateral cords both abduction and adduction, possible mild glottic gap.  Right lateral hypopharyngeal wall swelling, extending to the right arytenoid region, midline postcricoid region.  Laryngeal erythema noted on the right side.  On Decadron, Unasyn    Plan  Continue unasyn 3 g q6h D3 likely will plan for total 10 days after I&D  Continue IV decadron 4 mg qd no need to taper  After procedure consider Toradol Q 6hrs and conservative management  OMFS and ENT on board  NPO for probable surgical intervention, will defer to " OMS/ENT for likely I&D.  Continue DVT prophylaxis  UOOB as tolerated  Continue stepdown 1 pending upon surgical procedure considering transfer to [address]    BENI (obstructive sleep apnea)  History of BENI noncompliant with CPAP   CPAP qhs ordered and encouraged to be used while inpatient  Disposition: Stepdown Level 2    ICU Core Measures     A: Assess, Prevent, and Manage Pain Has pain been assessed? Yes  Need for changes to pain regimen? No   B: Both SAT/SAT  N/A   C: Choice of Sedation RASS Goal: 0 Alert and Calm or N/A patient not on sedation  Need for changes to sedation or analgesia regimen? NA   D: Delirium CAM-ICU: Negative   E: Early Mobility  Plan for early mobility? Yes   F: Family Engagement Plan for family engagement today? Yes       Antibiotic Review: Patient on appropriate coverage based on culture data.       Prophylaxis:  VTE VTE covered by:  enoxaparin, Subcutaneous, 40 mg at 09/22/24 0813       Stress Ulcer  not ordered         24 Hour Events   24hr events: No acute overnight events.  Patient placed on n.p.o. tentatively for I&D by OMFS versus ENT.  Patient seen at bedside denies any acute complaint.  Endorsed swelling has remained about the same.  Able to manage secretions.  Otherwise no acute complaint.  Subjective   Review of Systems: Review of Systems   Constitutional:  Negative for chills and fever.   HENT:  Positive for facial swelling. Negative for ear pain, sore throat, trouble swallowing and voice change.    Eyes:  Negative for pain and visual disturbance.   Respiratory:  Negative for cough and shortness of breath.    Cardiovascular:  Negative for chest pain and palpitations.   Gastrointestinal:  Negative for abdominal pain and vomiting.   Genitourinary:  Negative for dysuria and hematuria.   Musculoskeletal:  Negative for arthralgias and back pain.   Skin:  Negative for color change and rash.   Neurological:  Negative for seizures and syncope.   All other systems reviewed and are  negative.      Objective                          Vitals I/O      Most Recent Min/Max in 24hrs   Temp 98.6 °F (37 °C) Temp  Min: 98.2 °F (36.8 °C)  Max: 99 °F (37.2 °C)   Pulse (!) 54 Pulse  Min: 54  Max: 72   Resp 16 Resp  Min: 13  Max: 26   /70 BP  Min: 90/53  Max: 131/91   O2 Sat 93 % SpO2  Min: 93 %  Max: 100 %      Intake/Output Summary (Last 24 hours) at 9/23/2024 0643  Last data filed at 9/22/2024 1654  Gross per 24 hour   Intake 480 ml   Output --   Net 480 ml       Diet NPO    Invasive Monitoring           Physical Exam   Physical Exam  Vitals and nursing note reviewed.   Eyes:      Extraocular Movements: Extraocular movements intact.      Pupils: Pupils are equal, round, and reactive to light.   HENT:      Head: Normocephalic and atraumatic.      Right Ear: Hearing normal.      Left Ear: Hearing normal.      Mouth/Throat:      Mouth: Mucous membranes are moist.      Pharynx: No oropharyngeal exudate.   Cardiovascular:      Pulses: Normal pulses.   Musculoskeletal:         General: Normal range of motion.   Abdominal: General: Bowel sounds are normal.      Palpations: Abdomen is soft.   Constitutional:       Appearance: He is well-developed and well-nourished. He is not ill-appearing.   Pulmonary:      Effort: Pulmonary effort is normal.      Breath sounds: Normal breath sounds.   Neurological:      General: No focal deficit present.      Mental Status: Mental status is at baseline.          Diagnostic Studies        Lab Results: I have reviewed the following results:      Medications:  Scheduled PRN   ampicillin-sulbactam, 3 g, Q6H  chlorhexidine, 15 mL, Q12H MARILYN  dexamethasone, 4 mg, Daily  enoxaparin, 40 mg, Daily      polyethylene glycol, 17 g, Daily PRN       Continuous          Labs:   CBC    Recent Labs     09/22/24  0431 09/23/24  0445   WBC 15.88* 14.44*   HGB 12.4 12.1   HCT 37.3 36.9    230     BMP    Recent Labs     09/22/24  0431 09/23/24  0445   SODIUM 138 138   K 3.9 4.0   CL  109* 106   CO2 22 26   AGAP 7 6   BUN 17 16   CREATININE 0.93 0.90   CALCIUM 8.7 8.8       Coags    No recent results     Additional Electrolytes  Recent Labs     09/23/24  0445   MG 2.0   PHOS 2.6          Blood Gas    No recent results  No recent results LFTs  Recent Labs     09/21/24  1058 09/23/24  0445   ALT 13 12   AST 15 15   ALKPHOS 71 52   ALB 4.1 3.6   TBILI 1.14* 0.67       Infectious  Recent Labs     09/21/24  1058   PROCALCITONI <0.05     Glucose  Recent Labs     09/21/24  1058 09/22/24  0431 09/23/24  0445   GLUC 118 128 104

## 2024-09-23 NOTE — ASSESSMENT & PLAN NOTE
"POA presented with 3 day history of pain prior to admission 9/21 in right lower jaw associated with swelling and difficulty opening mouth.Recently had had dental crown placement due to fractured lower right first molar, 2 weeks prior to onset of symptoms, denies tooth extraction or manipulation of teeth/gum/jaw. No abx given at that time.  Prior to admission and was administered Decadron and Toradol with improvement in pain and swelling.  No fevers chills.  Did had difficulty swallowing and slurred speech  CT soft tissue head and neck on 09/21:\" Mass adjacent to and arising from medial pterygoid.  Masslike focus arising from the right mastoid space with mass effect on the lower nasopharynx, oropharynx, and even inferiorly to the hypopharynx. There is associated inflammatory or infiltrative mucosal thickening involving the right posterior aspect of the oropharynx.  Developing abscess/infectious phlegmon favored.  Malignancy cannot be excluded. Mass defect resulting in leftward displacement of the epiglottis.  Small amount of fluid in the retropharyngeal danger space to just below the hyoid.  No extension into the mediastinum\"  09/21 flexible laryngoscopy performed.  Intact bilateral cords both abduction and adduction, possible mild glottic gap.  Right lateral hypopharyngeal wall swelling, extending to the right arytenoid region, midline postcricoid region.  Laryngeal erythema noted on the right side.  On Decadron, Unasyn    Plan  Continue unasyn 3 g q6h D3 likely will plan for total 10 days after I&D  Continue IV decadron 4 mg qd no need to taper  After procedure consider Toradol Q 6hrs and conservative management  OMFS and ENT on board  NPO for probable surgical intervention, will defer to OMS/ENT for likely I&D.  Continue DVT prophylaxis  UOOB as tolerated  Continue stepdown 1 pending upon surgical procedure considering transfer to [address]    "

## 2024-09-23 NOTE — ASSESSMENT & PLAN NOTE
History of BENI noncompliant with CPAP   CPAP qhs ordered and encouraged to be used while inpatient

## 2024-09-24 ENCOUNTER — TRANSITIONAL CARE MANAGEMENT (OUTPATIENT)
Dept: INTERNAL MEDICINE CLINIC | Facility: CLINIC | Age: 74
End: 2024-09-24

## 2024-09-24 VITALS
HEIGHT: 72 IN | RESPIRATION RATE: 18 BRPM | BODY MASS INDEX: 27.56 KG/M2 | WEIGHT: 203.48 LBS | HEART RATE: 66 BPM | OXYGEN SATURATION: 97 % | DIASTOLIC BLOOD PRESSURE: 78 MMHG | SYSTOLIC BLOOD PRESSURE: 134 MMHG | TEMPERATURE: 98.1 F

## 2024-09-24 PROBLEM — D72.829 LEUCOCYTOSIS: Status: ACTIVE | Noted: 2024-09-24

## 2024-09-24 LAB
ANION GAP SERPL CALCULATED.3IONS-SCNC: 7 MMOL/L (ref 4–13)
BASOPHILS # BLD AUTO: 0.02 THOUSANDS/ΜL (ref 0–0.1)
BASOPHILS NFR BLD AUTO: 0 % (ref 0–1)
BUN SERPL-MCNC: 18 MG/DL (ref 5–25)
CALCIUM SERPL-MCNC: 8.8 MG/DL (ref 8.4–10.2)
CHLORIDE SERPL-SCNC: 103 MMOL/L (ref 96–108)
CO2 SERPL-SCNC: 27 MMOL/L (ref 21–32)
CREAT SERPL-MCNC: 0.94 MG/DL (ref 0.6–1.3)
EOSINOPHIL # BLD AUTO: 0.02 THOUSAND/ΜL (ref 0–0.61)
EOSINOPHIL NFR BLD AUTO: 0 % (ref 0–6)
ERYTHROCYTE [DISTWIDTH] IN BLOOD BY AUTOMATED COUNT: 13.3 % (ref 11.6–15.1)
GFR SERPL CREATININE-BSD FRML MDRD: 80 ML/MIN/1.73SQ M
GLUCOSE SERPL-MCNC: 133 MG/DL (ref 65–140)
HCT VFR BLD AUTO: 38.6 % (ref 36.5–49.3)
HGB BLD-MCNC: 12.8 G/DL (ref 12–17)
IMM GRANULOCYTES # BLD AUTO: 0.08 THOUSAND/UL (ref 0–0.2)
IMM GRANULOCYTES NFR BLD AUTO: 1 % (ref 0–2)
LYMPHOCYTES # BLD AUTO: 1.1 THOUSANDS/ΜL (ref 0.6–4.47)
LYMPHOCYTES NFR BLD AUTO: 9 % (ref 14–44)
MAGNESIUM SERPL-MCNC: 2 MG/DL (ref 1.9–2.7)
MCH RBC QN AUTO: 29.3 PG (ref 26.8–34.3)
MCHC RBC AUTO-ENTMCNC: 33.2 G/DL (ref 31.4–37.4)
MCV RBC AUTO: 88 FL (ref 82–98)
MONOCYTES # BLD AUTO: 0.74 THOUSAND/ΜL (ref 0.17–1.22)
MONOCYTES NFR BLD AUTO: 6 % (ref 4–12)
NEUTROPHILS # BLD AUTO: 10.56 THOUSANDS/ΜL (ref 1.85–7.62)
NEUTS SEG NFR BLD AUTO: 84 % (ref 43–75)
NRBC BLD AUTO-RTO: 0 /100 WBCS
PHOSPHATE SERPL-MCNC: 4.2 MG/DL (ref 2.3–4.1)
PLATELET # BLD AUTO: 258 THOUSANDS/UL (ref 149–390)
PMV BLD AUTO: 10.5 FL (ref 8.9–12.7)
POTASSIUM SERPL-SCNC: 4.2 MMOL/L (ref 3.5–5.3)
RBC # BLD AUTO: 4.37 MILLION/UL (ref 3.88–5.62)
SODIUM SERPL-SCNC: 137 MMOL/L (ref 135–147)
WBC # BLD AUTO: 12.52 THOUSAND/UL (ref 4.31–10.16)

## 2024-09-24 PROCEDURE — NC001 PR NO CHARGE: Performed by: EMERGENCY MEDICINE

## 2024-09-24 PROCEDURE — 99232 SBSQ HOSP IP/OBS MODERATE 35: CPT | Performed by: EMERGENCY MEDICINE

## 2024-09-24 PROCEDURE — 83735 ASSAY OF MAGNESIUM: CPT | Performed by: DENTIST

## 2024-09-24 PROCEDURE — 85025 COMPLETE CBC W/AUTO DIFF WBC: CPT | Performed by: DENTIST

## 2024-09-24 PROCEDURE — 84100 ASSAY OF PHOSPHORUS: CPT | Performed by: DENTIST

## 2024-09-24 PROCEDURE — 80048 BASIC METABOLIC PNL TOTAL CA: CPT | Performed by: DENTIST

## 2024-09-24 RX ORDER — CHLORHEXIDINE GLUCONATE ORAL RINSE 1.2 MG/ML
15 SOLUTION DENTAL EVERY 12 HOURS SCHEDULED
Qty: 120 ML | Refills: 0 | Status: SHIPPED | OUTPATIENT
Start: 2024-09-24

## 2024-09-24 RX ADMIN — ENOXAPARIN SODIUM 40 MG: 40 INJECTION SUBCUTANEOUS at 08:15

## 2024-09-24 RX ADMIN — AMPICILLIN AND SULBACTAM 3 G: 10; 5 INJECTION, POWDER, FOR SOLUTION INTRAVENOUS at 06:23

## 2024-09-24 RX ADMIN — CHLORHEXIDINE GLUCONATE 15 ML: 1.2 RINSE ORAL at 08:15

## 2024-09-24 RX ADMIN — AMPICILLIN AND SULBACTAM 3 G: 10; 5 INJECTION, POWDER, FOR SOLUTION INTRAVENOUS at 00:58

## 2024-09-24 NOTE — PLAN OF CARE
Problem: PAIN - ADULT  Goal: Verbalizes/displays adequate comfort level or baseline comfort level  Description: Interventions:  - Encourage patient to monitor pain and request assistance  - Assess pain using appropriate pain scale  - Administer analgesics based on type and severity of pain and evaluate response  - Implement non-pharmacological measures as appropriate and evaluate response  - Consider cultural and social influences on pain and pain management  - Notify physician/advanced practitioner if interventions unsuccessful or patient reports new pain  Outcome: Progressing     Problem: INFECTION - ADULT  Goal: Absence or prevention of progression during hospitalization  Description: INTERVENTIONS:  - Assess and monitor for signs and symptoms of infection  - Monitor lab/diagnostic results  - Monitor all insertion sites, i.e. indwelling lines, tubes, and drains  - Monitor endotracheal if appropriate and nasal secretions for changes in amount and color  - [place] appropriate cooling/warming therapies per order  - Administer medications as ordered  - Instruct and encourage patient and family to use good hand hygiene technique  - Identify and instruct in appropriate isolation precautions for identified infection/condition  Outcome: Progressing  Goal: Absence of fever/infection during neutropenic period  Description: INTERVENTIONS:  - Monitor WBC    Outcome: Progressing

## 2024-09-24 NOTE — ASSESSMENT & PLAN NOTE
"POA presented with 3 day history of pain prior to admission 9/21 in right lower jaw associated with swelling and difficulty opening mouth.Recently had had dental crown placement due to fractured lower right first molar, 2 weeks prior to onset of symptoms, denies tooth extraction or manipulation of teeth/gum/jaw. No abx given at that time.  Prior to admission and was administered Decadron and Toradol with improvement in pain and swelling.  No fevers chills.  Did had difficulty swallowing and slurred speech  CT soft tissue head and neck on 09/21:\" Mass adjacent to and arising from medial pterygoid.  Masslike focus arising from the right mastoid space with mass effect on the lower nasopharynx, oropharynx, and even inferiorly to the hypopharynx. There is associated inflammatory or infiltrative mucosal thickening involving the right posterior aspect of the oropharynx.  Developing abscess/infectious phlegmon favored.  Malignancy cannot be excluded. Mass defect resulting in leftward displacement of the epiglottis.  Small amount of fluid in the retropharyngeal danger space to just below the hyoid.  No extension into the mediastinum\"  09/21 flexible laryngoscopy performed.  Intact bilateral cords both abduction and adduction, possible mild glottic gap.  Right lateral hypopharyngeal wall swelling, extending to the right arytenoid region, midline postcricoid region.  Laryngeal erythema noted on the right side.  POD #1 I/D OP noted significant purulent discharge. Pain controlled adequate    Plan  Continue unasyn 3 g q6h D4 total likely will plan for total 10 days after I&D until 10/02 transition to PO Augmentin on DC  OFF steroid  Continue with Toradol 15mg Q6 hrs with anti-inflammatory and pain management.  Can also add Tylenol scheduled.  OMFS and ENT on board  Continue DVT prophylaxis  UOOB as tolerated  Tx to [facility]    "

## 2024-09-24 NOTE — PROGRESS NOTES
"Progress Note - Critical Care/ICU   Name: Cody Fenton 74 y.o. male I MRN: [id number]  Unit/Bed#: ICU 01 I Date of Admission: 9/21/2024   Date of Service: 9/24/2024 I Hospital Day: 3      Assessment & Plan  Mandibular abscess  POA presented with 3 day history of pain prior to admission 9/21 in right lower jaw associated with swelling and difficulty opening mouth.Recently had had dental crown placement due to fractured lower right first molar, 2 weeks prior to onset of symptoms, denies tooth extraction or manipulation of teeth/gum/jaw. No abx given at that time.  Prior to admission and was administered Decadron and Toradol with improvement in pain and swelling.  No fevers chills.  Did had difficulty swallowing and slurred speech  CT soft tissue head and neck on 09/21:\" Mass adjacent to and arising from medial pterygoid.  Masslike focus arising from the right mastoid space with mass effect on the lower nasopharynx, oropharynx, and even inferiorly to the hypopharynx. There is associated inflammatory or infiltrative mucosal thickening involving the right posterior aspect of the oropharynx.  Developing abscess/infectious phlegmon favored.  Malignancy cannot be excluded. Mass defect resulting in leftward displacement of the epiglottis.  Small amount of fluid in the retropharyngeal danger space to just below the hyoid.  No extension into the mediastinum\"  09/21 flexible laryngoscopy performed.  Intact bilateral cords both abduction and adduction, possible mild glottic gap.  Right lateral hypopharyngeal wall swelling, extending to the right arytenoid region, midline postcricoid region.  Laryngeal erythema noted on the right side.  POD #1 I/D OP noted significant purulent discharge. Pain controlled adequate    Plan  Continue unasyn 3 g q6h D4 total likely will plan for total 10 days after I&D until 10/02 transition to PO Augmentin on DC  OFF steroid  Continue with Toradol 15mg Q6 hrs with anti-inflammatory and pain " management.  Can also add Tylenol scheduled.  OMFS and ENT on board  Continue DVT prophylaxis  UOOB as tolerated  Tx to Med Surge    BENI (obstructive sleep apnea)  History of BENI noncompliant with CPAP   CPAP qhs ordered and encouraged to be used while inpatient  Leucocytosis  Leukocytosis improving after I&D  Continue antibiotics as above plan for total antibiotic 10 days after I&D.  Disposition: Med Surg    ICU Core Measures     A: Assess, Prevent, and Manage Pain Has pain been assessed? Yes  Need for changes to pain regimen? No   B: Both SAT/SAT  N/A   C: Choice of Sedation RASS Goal: N/A patient not on sedation  Need for changes to sedation or analgesia regimen? NA   D: Delirium CAM-ICU: Negative   E: Early Mobility  Plan for early mobility? Yes   F: Family Engagement Plan for family engagement today? Yes       Antibiotic Review: Patient on appropriate coverage based on culture data.  and Awaiting culture results.       Prophylaxis:  VTE VTE covered by:  enoxaparin, Subcutaneous, 40 mg at 09/23/24 0814       Stress Ulcer  not ordered         24 Hour Events   24hr events: Postop day 1 after incision and drainage.  Noted significant purulent discharge.  No acute overnight events.  Required 1 dose of Toradol otherwise pain is essentially well-controlled.  Endorses 1/10.  No significant swelling.  Tolerating surgical soft diet.  Subjective   Review of Systems: Review of Systems   Constitutional:  Negative for chills and fever.   HENT:  Positive for dental problem. Negative for ear pain and sore throat.    Eyes:  Negative for pain and visual disturbance.   Respiratory:  Negative for cough and shortness of breath.    Cardiovascular:  Negative for chest pain and palpitations.   Gastrointestinal:  Negative for abdominal pain and vomiting.   Genitourinary:  Negative for dysuria and hematuria.   Musculoskeletal:  Negative for arthralgias and back pain.   Skin:  Negative for color change and rash.   Neurological:  Negative  for seizures and syncope.   All other systems reviewed and are negative.      Objective                          Vitals I/O      Most Recent Min/Max in 24hrs   Temp 98.3 °F (36.8 °C) Temp  Min: 97.7 °F (36.5 °C)  Max: 98.9 °F (37.2 °C)   Pulse (!) 50 Pulse  Min: 50  Max: 66   Resp 16 Resp  Min: 15  Max: 21   /93 BP  Min: 107/64  Max: 153/91   O2 Sat 95 % SpO2  Min: 93 %  Max: 100 %      Intake/Output Summary (Last 24 hours) at 9/24/2024 0613  Last data filed at 9/24/2024 0400  Gross per 24 hour   Intake 450 ml   Output 955 ml   Net -505 ml       Diet Surgical; Surgical Soft/Lite Meal; Dysphagia 3-Dental Soft; Thin Liquid    Invasive Monitoring           Physical Exam   Physical Exam  Vitals and nursing note reviewed.   Eyes:      General: Vision grossly intact.      Extraocular Movements: Extraocular movements intact.      Conjunctiva/sclera: Conjunctivae normal.      Pupils: Pupils are equal, round, and reactive to light.   Skin:     General: Skin is warm and dry.   HENT:      Head: Normocephalic and atraumatic.        Right Ear: Hearing normal.      Left Ear: Hearing normal.      Nose: No congestion.      Mouth/Throat:      Mouth: Mucous membranes are moist.      Dentition: Normal dentition.      Pharynx: No oropharyngeal exudate.   Cardiovascular:      Rate and Rhythm: Normal rate and regular rhythm.      Pulses: Normal pulses.   Abdominal:      Palpations: Abdomen is soft.   Constitutional:       General: He is not in acute distress.     Appearance: He is well-developed and well-nourished. He is not ill-appearing.   Pulmonary:      Effort: Pulmonary effort is normal.      Breath sounds: Normal breath sounds.   Neurological:      Mental Status: He is alert and oriented to person, place and time.        Diagnostic Studies        Lab Results: I have reviewed the following results:      Medications:  Scheduled PRN   ampicillin-sulbactam, 3 g, Q6H  chlorhexidine, 15 mL, Q12H MARILYN  enoxaparin, 40 mg, Daily       ketorolac, 15 mg, Q6H PRN  polyethylene glycol, 17 g, Daily PRN       Continuous          Labs:   CBC    Recent Labs     09/23/24 0445 09/24/24  0434   WBC 14.44* 12.52*   HGB 12.1 12.8   HCT 36.9 38.6    258     BMP    Recent Labs     09/23/24 0445 09/24/24  0434   SODIUM 138 137   K 4.0 4.2    103   CO2 26 27   AGAP 6 7   BUN 16 18   CREATININE 0.90 0.94   CALCIUM 8.8 8.8       Coags    No recent results     Additional Electrolytes  Recent Labs     09/23/24 0445 09/24/24  0434   MG 2.0 2.0   PHOS 2.6 4.2*          Blood Gas    No recent results  No recent results LFTs  Recent Labs     09/23/24  0445   ALT 12   AST 15   ALKPHOS 52   ALB 3.6   TBILI 0.67       Infectious  No recent results  Glucose  Recent Labs     09/23/24 0445 09/24/24  0434   GLUC 104 133

## 2024-09-24 NOTE — ASSESSMENT & PLAN NOTE
Leukocytosis improving after I&D  Continue antibiotics as above plan for total antibiotic 10 days after I&D.

## 2024-09-24 NOTE — ASSESSMENT & PLAN NOTE
"POA presented with 3 day history of pain prior to admission 9/21 in right lower jaw associated with swelling and difficulty opening mouth.Recently had had dental crown placement due to fractured lower right first molar, 2 weeks prior to onset of symptoms, denies tooth extraction or manipulation of teeth/gum/jaw. No abx given at that time.  Prior to admission and was administered Decadron and Toradol with improvement in pain and swelling.  No fevers chills.  Did had difficulty swallowing and slurred speech  CT soft tissue head and neck on 09/21:\" Mass adjacent to and arising from medial pterygoid.  Masslike focus arising from the right mastoid space with mass effect on the lower nasopharynx, oropharynx, and even inferiorly to the hypopharynx. There is associated inflammatory or infiltrative mucosal thickening involving the right posterior aspect of the oropharynx.  Developing abscess/infectious phlegmon favored.  Malignancy cannot be excluded. Mass defect resulting in leftward displacement of the epiglottis.  Small amount of fluid in the retropharyngeal danger space to just below the hyoid.  No extension into the mediastinum\"  09/21 flexible laryngoscopy performed.  Intact bilateral cords both abduction and adduction, possible mild glottic gap.  Right lateral hypopharyngeal wall swelling, extending to the right arytenoid region, midline postcricoid region.  Laryngeal erythema noted on the right side.  POD #1 I/D OP noted significant purulent discharge. Pain controlled adequate    Plan  Transition to p.o. Augmentin 875/125 mg twice a day for total 10 days until 10/4  Recommend pain/inflammatory management with Tylenol/Advil as needed every 6 to 3 hours alternating.  Discussed adequate oral hygiene Listerine versus chlorhexidine at least twice a day.  Follow-up with OMFS 1 week post procedure  Recommend follow-up with primary care provider within 1 week.  Intraoperative cultures in process this can be follow-up by " primary care provider

## 2024-09-24 NOTE — DISCHARGE INSTR - AVS FIRST PAGE
Dear Cody Fenton,     It was our pleasure to care for you here at [facility].  It is our hope that we were always able to exceed the expected standards for your care during your stay.  You were hospitalized due to right-sided  space abscess   You were cared for on the ICU floor by Alissa Vernon MD under the service of Corinne Garay MD with the [facility] who covers for your primary care physician (PCP), Tommy Hermosillo DO, while you were hospitalized.  If you have any questions or concerns related to this hospitalization, you may contact us at .  For follow up as well as any medication refills, we recommend that you follow up with your primary care physician.  A registered nurse will reach out to you by phone within a few days after your discharge to answer any additional questions that you may have after going home.  However, at this time we provide for you here, the most important instructions / recommendations at discharge:     Notable Medication Adjustments -   Starting this evening continue with Augmentin 875- 125mg twice a day for a total of 10 days until 10/4  Testing Required after Discharge -   None  ** Please contact your PCP to request testing orders for any of the testing recommended here **  Important follow up information -   Follow-up with primary care provider within 1 week  Follow-up with oral maxillofacial surgery 7 to 10 days from discharge.  Other Instructions -   Advance diet as tolerated can continue with dental soft.  Continue with oral care hygiene at least twice a day and after meals.  Can use Listerine mouthwash twice a day or chlorhexidine.  For pain and inflammation recommend Tylenol 650 to 975 mg every 6-8 hours.  Avoid surpassing 3 g daily for pain.  Can alternate also with Advil 400 mg daily every 6-8 hours.  Continue with conservative management with ice pack externally 15 to 20 minutes every  hour.  Please review this entire after visit summary as additional general instructions including medication list, appointments, activity, diet, any pertinent wound care, and other additional recommendations from your care team that may be provided for you.      Sincerely,     Alissa Vernon MD

## 2024-09-24 NOTE — CASE MANAGEMENT
Case Management Discharge Planning Note    Patient name Cody Fenton  Location ICU 01/ICU 01 MRN [id number]  : 1950 Date 2024       Current Admission Date: 2024  Current Admission Diagnosis:Mandibular abscess   Patient Active Problem List    Diagnosis Date Noted Date Diagnosed    Leucocytosis 2024     Mandibular abscess 2024     BENI (obstructive sleep apnea)      Left ureteral calculus 2020     [place] cardiac risk 10-20% in next 10 years 2020     History of nephrolithiasis 2018     Fatty liver 2017     Low vitamin D level 07/15/2016     Lung nodule 2015     Benign prostatic hyperplasia without lower urinary tract symptoms 2014       LOS (days): 3  Geometric Mean LOS (GMLOS) (days): 2  Days to GMLOS:-0.8     OBJECTIVE:  Risk of Unplanned Readmission Score: 5.82         Current admission status: Inpatient   Preferred Pharmacy:   [id number] - [place] - [address]  [address] [address]  Phone: [phone] Fax: [phone]    Primary Care Provider: Tommy Hermosillo DO    Primary Insurance: [employer] [facility]  Secondary Insurance:     DISCHARGE DETAILS:    Other Referral/Resources/Interventions Provided:  Interventions: None Indicated    Treatment Team Recommendation: Home  Discharge Destination Plan:: Home    IMM Given (Date):: 24  IMM Given to:: Patient    IMM reviewed with patient, patient agrees with discharge determination.

## 2024-09-24 NOTE — DISCHARGE SUMMARY
"Discharge Summary - Critical Care/ICU   Name: Cody Fenton 74 y.o. male I MRN: [id number]  Unit/Bed#: ICU 01 I Date of Admission: 9/21/2024   Date of Service: 9/24/2024 I Hospital Day: 3     Admission Date: 9/21/2024 0957  Discharge Date: 09/24/24  Admitting Diagnosis: Abscess of  space of mouth [K12.2]  Headache [R51.9]  Discharge Diagnosis:   Medical Problems       Resolved Problems  Date Reviewed: 9/24/2024   None         HPI: 74-year-old male with history of BENI who presented with 3-day history of worsening right-sided jaw pain with associated swelling, erythema and difficulty opening mouth, swallowing.  Prior to admission did had dental work with crown placement.  Upon evaluation noted hemodynamically stable however leukocytosis of 14.8.  Started on IV Unasyn, Decadron and Toradol as an supplementary effect.  CT soft tissue did showed masslike focus arising from right mastoid space favoring phlegmon/abscess.  Given concern for impending airway obstruction patient admitted intensive care unit as a stepdown 1 4\" airway follow-up    Procedures Performed: No orders of the defined types were placed in this encounter.      Summary of Hospital Course: Hospital Course: No notes on file 74-year-old male who presented with 3-day history of right-sided jaw pain with associated swelling, erythema, trismus and swallowing after 2-week history of reported crown replacement.  On evaluation found with leukocytosis of 14.8.  CT imaging concerning for masslike focus arising from the right mastoid space favoring infectious abscess.  Patient completed 4 days of IV antibiotics, Decadron.  OMFS consulted, on 09/23 did had incision and drainage right  space with drainage and irrigation of purulent discharge.    Today patient was deemed stable for discharge expected to continue oral antibiotics with Augmentin 875/125 mg twice a day for total of 10 days after incision and drainage.  Recommended adequate oral " hydration at least twice a day including flossing, mouthwash.  Also recommend continue pain management with OTC Advil/Tylenol.  Patient expected to follow-up with OMFS within 1 week of discharge.    Significant Findings, Care, Treatment and Services Provided: OMFS, ENT, critical care    Complications: None    Condition at Discharge: good       Discharge instructions/Information to patient and family:   See After Visit Summary (AVS) for information provided to patient and family.      Provisions for Follow-Up Care:  See after visit summary for information related to follow-up care and any pertinent home health orders.      PCP: Tommy Hermosillo DO    Disposition: Home    Planned Readmission: No     Discharge Medications:  See after visit summary for reconciled discharge medications provided to patient and family.      Discharge Statement:  I have spent a total time of 37 minutes in caring for this patient on the day of the visit/encounter. >30 minutes of time was spent on: Instructions for management, Patient and family education, Importance of tx compliance, Counseling / Coordination of care, and Documenting in the medical record.

## 2024-09-25 LAB
BACTERIA SPEC ANAEROBE CULT: NORMAL
BACTERIA SPEC BFLD CULT: ABNORMAL
GRAM STN SPEC: ABNORMAL

## 2024-09-25 NOTE — UTILIZATION REVIEW
NOTIFICATION OF ADMISSION DISCHARGE   This is a Notification of Discharge from [facility]. Please be advised that this patient has been discharge from our facility. Below you will find the admission and discharge date and time including the patient’s disposition.   UTILIZATION REVIEW CONTACT:  Altagracia Field  Utilization   Network Utilization Review Department  Phone: [phone] x carefully listen to the prompts. All voicemails are confidential.  Email: [email or web address]     ADMISSION INFORMATION  PRESENTATION DATE: 9/21/2024  9:57 AM  OBERVATION ADMISSION DATE: N/A  INPATIENT ADMISSION DATE: 9/21/24  2:57 PM   DISCHARGE DATE: 9/24/2024 11:46 AM   DISPOSITION:Home/Self Care    Network Utilization Review Department  ATTENTION: Please call with any questions or concerns to [phone] and carefully listen to the prompts so that you are directed to the right person. All voicemails are confidential.   For Discharge needs, contact Care Management DC Support Team at [phone] opt. 2  Send all requests for admission clinical reviews, approved or denied determinations and any other requests to dedicated fax number below belonging to the campus where the patient is receiving treatment. List of dedicated fax numbers for the Facilities:  FACILITY NAME UR FAX NUMBER   ADMISSION DENIALS (Administrative/Medical Necessity) [phone]   DISCHARGE SUPPORT TEAM ([facility]) [phone]   PARENT CHILD HEALTH (Maternity/NICU/Pediatrics) [phone]   [facility] [phone]   [facility] [phone]   [facility] [phone]   [facility] [phone]   [facility] [phone]   [facility] [phone]   [facility] [phone]   [facility] [phone]    [facility] [phone]   [facility] [phone]   [facility] [phone]   [facility] [phone]

## 2024-11-10 ENCOUNTER — HOSPITAL ENCOUNTER (EMERGENCY)
Facility: HOSPITAL | Age: 74
Discharge: HOME | End: 2024-11-11
Attending: EMERGENCY MEDICINE
Payer: COMMERCIAL

## 2024-11-10 DIAGNOSIS — N20.0 KIDNEY STONE ON LEFT SIDE: ICD-10-CM

## 2024-11-10 DIAGNOSIS — N20.1 URETERAL CALCULUS: Primary | ICD-10-CM

## 2024-11-10 PROCEDURE — 99284 EMERGENCY DEPT VISIT MOD MDM: CPT

## 2024-11-11 ENCOUNTER — TELEPHONE (OUTPATIENT)
Dept: OTHER | Facility: HOSPITAL | Age: 74
End: 2024-11-11

## 2024-11-11 ENCOUNTER — APPOINTMENT (EMERGENCY)
Dept: CT IMAGING | Facility: HOSPITAL | Age: 74
End: 2024-11-11
Payer: COMMERCIAL

## 2024-11-11 VITALS
DIASTOLIC BLOOD PRESSURE: 83 MMHG | HEART RATE: 69 BPM | TEMPERATURE: 98 F | OXYGEN SATURATION: 94 % | RESPIRATION RATE: 20 BRPM | SYSTOLIC BLOOD PRESSURE: 129 MMHG

## 2024-11-11 LAB
ALBUMIN SERPL BCG-MCNC: 4.4 G/DL (ref 3.5–5)
ALP SERPL-CCNC: 76 U/L (ref 34–104)
ALT SERPL W P-5'-P-CCNC: 14 U/L (ref 7–52)
ANION GAP SERPL CALCULATED.3IONS-SCNC: 10 MMOL/L (ref 4–13)
AST SERPL W P-5'-P-CCNC: 18 U/L (ref 13–39)
BACTERIA UR QL AUTO: ABNORMAL /HPF
BASOPHILS # BLD AUTO: 0.06 THOUSANDS/ÂΜL (ref 0–0.1)
BASOPHILS NFR BLD AUTO: 1 % (ref 0–1)
BILIRUB SERPL-MCNC: 0.75 MG/DL (ref 0.2–1)
BILIRUB UR QL STRIP: NEGATIVE
BUN SERPL-MCNC: 23 MG/DL (ref 5–25)
CALCIUM SERPL-MCNC: 9.5 MG/DL (ref 8.4–10.2)
CHLORIDE SERPL-SCNC: 106 MMOL/L (ref 96–108)
CLARITY UR: CLEAR
CO2 SERPL-SCNC: 19 MMOL/L (ref 21–32)
COLOR UR: YELLOW
CREAT SERPL-MCNC: 1.22 MG/DL (ref 0.6–1.3)
EOSINOPHIL # BLD AUTO: 0.02 THOUSAND/ÂΜL (ref 0–0.61)
EOSINOPHIL NFR BLD AUTO: 0 % (ref 0–6)
ERYTHROCYTE [DISTWIDTH] IN BLOOD BY AUTOMATED COUNT: 13.8 % (ref 11.6–15.1)
GFR SERPL CREATININE-BSD FRML MDRD: 58 ML/MIN/1.73SQ M
GLUCOSE SERPL-MCNC: 125 MG/DL (ref 65–140)
GLUCOSE UR STRIP-MCNC: NEGATIVE MG/DL
HCT VFR BLD AUTO: 45.4 % (ref 36.5–49.3)
HGB BLD-MCNC: 14.6 G/DL (ref 12–17)
HGB UR QL STRIP.AUTO: ABNORMAL
IMM GRANULOCYTES # BLD AUTO: 0.07 THOUSAND/UL (ref 0–0.2)
IMM GRANULOCYTES NFR BLD AUTO: 1 % (ref 0–2)
KETONES UR STRIP-MCNC: ABNORMAL MG/DL
LEUKOCYTE ESTERASE UR QL STRIP: NEGATIVE
LIPASE SERPL-CCNC: 30 U/L (ref 11–82)
LYMPHOCYTES # BLD AUTO: 1.45 THOUSANDS/ÂΜL (ref 0.6–4.47)
LYMPHOCYTES NFR BLD AUTO: 11 % (ref 14–44)
MCH RBC QN AUTO: 28.9 PG (ref 26.8–34.3)
MCHC RBC AUTO-ENTMCNC: 32.2 G/DL (ref 31.4–37.4)
MCV RBC AUTO: 90 FL (ref 82–98)
MONOCYTES # BLD AUTO: 1.08 THOUSAND/ÂΜL (ref 0.17–1.22)
MONOCYTES NFR BLD AUTO: 8 % (ref 4–12)
MUCOUS THREADS UR QL AUTO: ABNORMAL
NEUTROPHILS # BLD AUTO: 10.23 THOUSANDS/ÂΜL (ref 1.85–7.62)
NEUTS SEG NFR BLD AUTO: 79 % (ref 43–75)
NITRITE UR QL STRIP: NEGATIVE
NON-SQ EPI CELLS URNS QL MICRO: ABNORMAL /HPF
NRBC BLD AUTO-RTO: 0 /100 WBCS
PH UR STRIP.AUTO: 5 [PH]
PLATELET # BLD AUTO: 282 THOUSANDS/UL (ref 149–390)
PMV BLD AUTO: 10.8 FL (ref 8.9–12.7)
POTASSIUM SERPL-SCNC: 4.2 MMOL/L (ref 3.5–5.3)
PROT SERPL-MCNC: 7.4 G/DL (ref 6.4–8.4)
PROT UR STRIP-MCNC: ABNORMAL MG/DL
RBC # BLD AUTO: 5.06 MILLION/UL (ref 3.88–5.62)
RBC #/AREA URNS AUTO: ABNORMAL /HPF
SODIUM SERPL-SCNC: 135 MMOL/L (ref 135–147)
SP GR UR STRIP.AUTO: 1.03 (ref 1–1.03)
UROBILINOGEN UR STRIP-ACNC: <2 MG/DL
WBC # BLD AUTO: 12.91 THOUSAND/UL (ref 4.31–10.16)
WBC #/AREA URNS AUTO: ABNORMAL /HPF

## 2024-11-11 PROCEDURE — 85025 COMPLETE CBC W/AUTO DIFF WBC: CPT | Performed by: STUDENT IN AN ORGANIZED HEALTH CARE EDUCATION/TRAINING PROGRAM

## 2024-11-11 PROCEDURE — 96366 THER/PROPH/DIAG IV INF ADDON: CPT

## 2024-11-11 PROCEDURE — 74177 CT ABD & PELVIS W/CONTRAST: CPT

## 2024-11-11 PROCEDURE — 36415 COLL VENOUS BLD VENIPUNCTURE: CPT | Performed by: STUDENT IN AN ORGANIZED HEALTH CARE EDUCATION/TRAINING PROGRAM

## 2024-11-11 PROCEDURE — 99284 EMERGENCY DEPT VISIT MOD MDM: CPT | Performed by: EMERGENCY MEDICINE

## 2024-11-11 PROCEDURE — 80053 COMPREHEN METABOLIC PANEL: CPT | Performed by: STUDENT IN AN ORGANIZED HEALTH CARE EDUCATION/TRAINING PROGRAM

## 2024-11-11 PROCEDURE — 81001 URINALYSIS AUTO W/SCOPE: CPT | Performed by: STUDENT IN AN ORGANIZED HEALTH CARE EDUCATION/TRAINING PROGRAM

## 2024-11-11 PROCEDURE — 96365 THER/PROPH/DIAG IV INF INIT: CPT

## 2024-11-11 PROCEDURE — 83690 ASSAY OF LIPASE: CPT | Performed by: STUDENT IN AN ORGANIZED HEALTH CARE EDUCATION/TRAINING PROGRAM

## 2024-11-11 PROCEDURE — 96375 TX/PRO/DX INJ NEW DRUG ADDON: CPT

## 2024-11-11 RX ORDER — OXYCODONE HYDROCHLORIDE 5 MG/1
5 TABLET ORAL EVERY 6 HOURS PRN
Qty: 20 TABLET | Refills: 0 | Status: SHIPPED | OUTPATIENT
Start: 2024-11-11

## 2024-11-11 RX ORDER — TAMSULOSIN HYDROCHLORIDE 0.4 MG/1
0.4 CAPSULE ORAL
Qty: 14 CAPSULE | Refills: 0 | Status: SHIPPED | OUTPATIENT
Start: 2024-11-11 | End: 2024-11-25

## 2024-11-11 RX ORDER — HYDROMORPHONE HCL/PF 1 MG/ML
0.5 SYRINGE (ML) INJECTION ONCE
Status: COMPLETED | OUTPATIENT
Start: 2024-11-11 | End: 2024-11-11

## 2024-11-11 RX ORDER — TAMSULOSIN HYDROCHLORIDE 0.4 MG/1
0.4 CAPSULE ORAL ONCE
Status: COMPLETED | OUTPATIENT
Start: 2024-11-11 | End: 2024-11-11

## 2024-11-11 RX ADMIN — SODIUM CHLORIDE, SODIUM LACTATE, POTASSIUM CHLORIDE, AND CALCIUM CHLORIDE 500 ML: .6; .31; .03; .02 INJECTION, SOLUTION INTRAVENOUS at 00:13

## 2024-11-11 RX ADMIN — IOHEXOL 100 ML: 350 INJECTION, SOLUTION INTRAVENOUS at 00:49

## 2024-11-11 RX ADMIN — TAMSULOSIN HYDROCHLORIDE 0.4 MG: 0.4 CAPSULE ORAL at 03:28

## 2024-11-11 RX ADMIN — HYDROMORPHONE HYDROCHLORIDE 0.5 MG: 1 INJECTION, SOLUTION INTRAMUSCULAR; INTRAVENOUS; SUBCUTANEOUS at 01:41

## 2024-11-11 RX ADMIN — SODIUM CHLORIDE, SODIUM LACTATE, POTASSIUM CHLORIDE, AND CALCIUM CHLORIDE 1000 ML: .6; .31; .03; .02 INJECTION, SOLUTION INTRAVENOUS at 00:09

## 2024-11-11 NOTE — TELEPHONE ENCOUNTER
74-year-old male known to our practice for BPH and nephrolithiasis.  Last seen physically in the office in 2021.  Presenting to the ED for flank pain similar to prior stones.    While in the ED he was afebrile hemodynamically stable, mild increase in creatinine 1.2 baseline 0.9-1.  Leukocytosis of 12, UA negative for infection appears as though pain was controlled.    ED reached out for outpatient follow-up.  Please assist with scheduling close outpatient follow-up and reevaluating his symptoms. Please reiterate ER precautions.    Thank you

## 2024-11-11 NOTE — ED PROVIDER NOTES
Time reflects when diagnosis was documented in both MDM as applicable and the Disposition within this note       Time User Action Codes Description Comment    11/11/2024  3:14 AM Alvaro Martinez Add [N20.1] Ureteral calculus     11/11/2024  3:15 AM Cedric Almanza Add [N20.0] Kidney stone on left side           ED Disposition       ED Disposition   Discharge    Condition   Stable    Date/Time   Mon Nov 11, 2024  3:13 AM    Comment   Cody Fenton discharge to home/self care.                   Assessment & Plan       Medical Decision Making  Patient presents with:  Abdominal Pain: Patient comes in reporting LUQ/LLQ pain that began today at 1500. Reporting constipation today. Denies dysuria. Rates pain 4/10 describes as cramping. Reports feels similar to past kidney stones.   DDx includes hydronephrosis, or renal versus urethral calculus and low suspicion of acute cardiopulmonary process at this time  Workup per ED course: CT A/P with 8mm Left ureteral calculus with hydroureteronephrosis.  Neurology notified.  Voiding spontaneously with marked improvement in symptoms.  Patient request discharge home with Flomax/analgesics,  PCP and urology follow-up.    Dispo: Workup and return precautions reviewed. Patient expresses understanding, is comfortable with discharge, aggress to follow-up as advised and return to ED if symptoms recur.     Amount and/or Complexity of Data Reviewed  Labs: ordered. Decision-making details documented in ED Course.  Radiology: ordered. Decision-making details documented in ED Course.    Risk  Prescription drug management.        ED Course as of 11/11/24 0456   Sun Nov 10, 2024   2310 Rapid onset colicky left flank pain radiating to groin since 3 PM w/o nausea/vomiting/diarrhea, fever/chills.  Presentation similar to prior episodes most recent CT A/P April '24 demonstrated residual stones.  Denies hematuria, GI symptoms or other complaints at this time.    Left flank tenderness  without CVA tenderness.  Cardiopulmonary exam unremarkable.  Abdomen soft nontender non distended.     C/f hydronephrosis, or renal versus urethral calculus and low suspicion of acute cardiopulmonary process at this time    Plan pain control PRN, labs, UA, CT A/P      Mon Nov 11, 2024   0019 WBC(!): 12.91   0019 Hemoglobin: 14.6   0040 LIPASE: 30       0050 Blood, UA(!): Large   0050 Ketones, UA(!): 20 (1+)   0050 POCT URINE PROTEIN(!): Trace   0050 RBC Urine(!): Innumerable   0050 Patient reassessed notes improvement in pain, denies nausea or other complaints at this time.  Patient requests CT imaging to evaluate for presence of kidney stones given discussion of workup thus far.    Plan CT A/P     0135 CT imaging reviewed with patient and wife demonstrating calculus left ureter with hydro ureter/nephrosis.  Additional calculi appear to be present in the left renal pelvis.  Patient has additional pain medication at this time.  Able to void spontaneously.     disposition pending final CT results     0319 CT abdomen pelvis with contrast  8 mm proximal left ureteral calculus causing moderate hydroureteronephrosis. There is moderate perinephric fluid and fat stranding.         Medications   lactated ringers bolus 1,000 mL (0 mL Intravenous Stopped 11/11/24 0147)   lactated ringers bolus 500 mL (0 mL Intravenous Stopped 11/11/24 0147)   iohexol (OMNIPAQUE) 350 MG/ML injection (MULTI-DOSE) 100 mL (100 mL Intravenous Given 11/11/24 0049)   HYDROmorphone (DILAUDID) injection 0.5 mg (0.5 mg Intravenous Given 11/11/24 0141)   tamsulosin (FLOMAX) capsule 0.4 mg (0.4 mg Oral Given 11/11/24 0328)       ED Risk Strat Scores                           SBIRT 20yo+      Flowsheet Row Most Recent Value   Initial Alcohol Screen: US AUDIT-C     1. How often do you have a drink containing alcohol? 0 Filed at: 11/10/2024 4036   2. How many drinks containing alcohol do you have on a typical day you are drinking?  0 Filed at: 11/10/2024  2305   3a. Male UNDER 65: How often do you have five or more drinks on one occasion? 0 Filed at: 11/10/2024 2305   3b. FEMALE Any Age, or MALE 65+: How often do you have 4 or more drinks on one occassion? 0 Filed at: 11/10/2024 2305   Audit-C Score 0 Filed at: 11/10/2024 2305   FARRAH: How many times in the past year have you...    Used an illegal drug or used a prescription medication for non-medical reasons? Never Filed at: 11/10/2024 2305                            History of Present Illness       Chief Complaint   Patient presents with    Abdominal Pain     Patient comes in reporting LUQ/LLQ pain that began today at 1500. Reporting constipation today. Denies dysuria. Rates pain 4/10 describes as cramping. Reports feels similar to past kidney stones.        Past Medical History:   Diagnosis Date    BPH (benign prostatic hyperplasia)     Kidney stone     isaias    Migraines       Past Surgical History:   Procedure Laterality Date    COLONOSCOPY      FL RETROGRADE PYELOGRAM  11/19/2020    INCISION AND DRAINAGE INTRA ORAL ABSCESS Right 9/23/2024    Procedure: INCISION AND DRAINAGE  (I&D) WOUND ORAL,tooth extraction #[id number];  Surgeon: Vernon Saenz DMD;  Location: [facility];  Service: Maxillofacial    LITHOTRIPSY      LYMPH NODE BIOPSY  1999    cervical, path showerd toxoplasmosis, no treatment    SD CYSTO INSERTION TRANSPROSTATIC IMPLANT SINGLE N/A 7/9/2021    Procedure: CYSTOSCOPY WITH INSERTION UROLIFT;  Surgeon: Godfrey Curtis MD;  Location: [facility];  Service: Urology    SD CYSTO/URETERO W/LITHOTRIPSY &INDWELL STENT INSRT Left 11/19/2020    Procedure: CYSTOSCOPY URETEROSCOPY WITH LITHOTRIPSY HOLMIUM LASER, RIGHT AND LEFT RETROGRADE PYELOGRAM  AND INSERTION LEFT  STENT URETERAL;  Surgeon: Aj Guerra MD;  Location: [facility];  Service: Urology    TONSILLECTOMY        Family History   Problem Relation Age of Onset    Colon cancer Mother         dx late 60's    Migraines Mother     Cancer Mother 60         Liver or Colon cancer?    Lung cancer Father     Alcohol abuse Neg Hx     Substance Abuse Neg Hx     Mental illness Neg Hx     Depression Neg Hx       Social History     Tobacco Use    Smoking status: Never    Smokeless tobacco: Never   Vaping Use    Vaping status: Never Used   Substance Use Topics    Alcohol use: Yes     Comment: 2 x per week    Drug use: No      E-Cigarette/Vaping    E-Cigarette Use Never User       E-Cigarette/Vaping Substances    Nicotine No     THC No     CBD No     Flavoring No     Other No     Unknown No       I have reviewed and agree with the history as documented.     Cody Fenton is a 74 y.o. male p/w Rapid onset colicky left flank pain radiating to groin since 3 PM w/o nausea/vomiting/diarrhea, fever/chills.  Presentation similar to prior episodes most recently with CT A/P April '24 demonstrated residual stones.  Denies hematuria, GI symptoms or other complaints at this time.    Abdominal Pain      Review of Systems   Gastrointestinal:  Positive for abdominal pain.   All other systems reviewed and are negative.          Objective       ED Triage Vitals   Temperature Pulse Blood Pressure Respirations SpO2 Patient Position - Orthostatic VS   11/10/24 2304 11/10/24 2304 11/10/24 2305 11/10/24 2304 11/10/24 2304 11/10/24 2304   98 °F (36.7 °C) 66 (!) 147/101 20 95 % Sitting      Temp src Heart Rate Source BP Location FiO2 (%) Pain Score    -- -- 11/10/24 2304 -- 11/11/24 0141      Right arm  4      Vitals      Date and Time Temp Pulse SpO2 Resp BP Pain Score FACES Pain Rating User   11/11/24 0200 -- 69 94 % 20 129/83 -- -- ND   11/11/24 0145 -- 68 97 % -- 149/88 -- -- ND   11/11/24 0141 -- -- -- -- -- 4 -- ND   11/10/24 2305 -- -- -- -- 147/101 -- --    11/10/24 2304 98 °F (36.7 °C) 66 95 % 20 -- -- --             Physical Exam    General appearance: resting comfortably, no acute distress   HENT: Normocephalic, atraumatic, hearing grossly intact, and mucous membranes moist   Eyes:  Conjunctiva normal, PERRL, EOM intact   Lungs/Chest: Respirations even and unlabored, breath sounds normal  Cardiovascular: Normal rate, regular rhythm  Abdomen: soft, non-distended, non-tender  Musculoskeletal: extremities normal, atraumatic, no cyanosis or edema   Pulses: radial / dorsalis pedis pulses palpable  Skin: warm and dry   Neurologic: Awake and alert, no apparent focal deficit  Psych: Mood consistent with affect     Results Reviewed       Procedure Component Value Units Date/Time    Urine Microscopic [[id number]]  (Abnormal) Collected: 11/11/24 0005    Lab Status: Final result Specimen: Urine, Clean Catch Updated: 11/11/24 0025     RBC, UA Innumerable /hpf      WBC, UA 1-2 /hpf      Epithelial Cells Occasional /hpf      Bacteria, UA None Seen /hpf      MUCUS THREADS Occasional    UA w Reflex to Microscopic w Reflex to Culture [[id number]]  (Abnormal) Collected: 11/11/24 0005    Lab Status: Final result Specimen: Urine, Clean Catch Updated: 11/11/24 0021     Color, UA Yellow     Clarity, UA Clear     Specific Gravity, UA 1.029     pH, UA 5.0     Leukocytes, UA Negative     Nitrite, UA Negative     Protein, UA Trace mg/dl      Glucose, UA Negative mg/dl      Ketones, UA 20 (1+) mg/dl      Urobilinogen, UA <2.0 mg/dl      Bilirubin, UA Negative     Occult Blood, UA Large    Comprehensive metabolic panel [[id number]]  (Abnormal) Collected: 11/11/24 0000    Lab Status: Final result Specimen: Blood from Arm, Right Updated: 11/11/24 0020     Sodium 135 mmol/L      Potassium 4.2 mmol/L      Chloride 106 mmol/L      CO2 19 mmol/L      ANION GAP 10 mmol/L      BUN 23 mg/dL      Creatinine 1.22 mg/dL      Glucose 125 mg/dL      Calcium 9.5 mg/dL      AST 18 U/L      ALT 14 U/L      Alkaline Phosphatase 76 U/L      Total Protein 7.4 g/dL      Albumin 4.4 g/dL      Total Bilirubin 0.75 mg/dL      eGFR 58 ml/min/1.73sq m     Narrative:      National Kidney Disease Foundation guidelines for Chronic Kidney Disease (CKD):      Stage 1 with normal or high GFR (GFR > 90 mL/min/1.73 square meters)    Stage 2 Mild CKD (GFR = 60-89 mL/min/1.73 square meters)    Stage 3A Moderate CKD (GFR = 45-59 mL/min/1.73 square meters)    Stage 3B Moderate CKD (GFR = 30-44 mL/min/1.73 square meters)    Stage 4 Severe CKD (GFR = 15-29 mL/min/1.73 square meters)    Stage 5 End Stage CKD (GFR <15 mL/min/1.73 square meters)  Note: GFR calculation is accurate only with a steady state creatinine    Lipase [[id number]]  (Normal) Collected: 11/11/24 0000    Lab Status: Final result Specimen: Blood from Arm, Right Updated: 11/11/24 0020     Lipase 30 u/L     CBC and differential [[id number]]  (Abnormal) Collected: 11/11/24 0000    Lab Status: Final result Specimen: Blood from Arm, Right Updated: 11/11/24 0009     WBC 12.91 Thousand/uL      RBC 5.06 Million/uL      Hemoglobin 14.6 g/dL      Hematocrit 45.4 %      MCV 90 fL      MCH 28.9 pg      MCHC 32.2 g/dL      RDW 13.8 %      MPV 10.8 fL      Platelets 282 Thousands/uL      nRBC 0 /100 WBCs      Segmented % 79 %      Immature Grans % 1 %      Lymphocytes % 11 %      Monocytes % 8 %      Eosinophils Relative 0 %      Basophils Relative 1 %      Absolute Neutrophils 10.23 Thousands/µL      Absolute Immature Grans 0.07 Thousand/uL      Absolute Lymphocytes 1.45 Thousands/µL      Absolute Monocytes 1.08 Thousand/µL      Eosinophils Absolute 0.02 Thousand/µL      Basophils Absolute 0.06 Thousands/µL             CT abdomen pelvis with contrast   Final Interpretation by Alberta Nixon MD (11/11 0244)      8 mm proximal left ureteral calculus causing moderate hydroureteronephrosis. There is moderate perinephric fluid and fat stranding.      The study was marked in EPIC for immediate notification.         Workstation performed: [id number]             Procedures    ED Medication and Procedure Management   Prior to Admission Medications   Prescriptions Last Dose Informant Patient Reported? Taking?   Ascorbic Acid  (VITAMIN C PO)  Self Yes No   Sig: Take by mouth daily    Cholecalciferol (VITAMIN D3) 2000 units capsule  Self Yes No   Sig: Take by mouth daily    Coenzyme Q10 (CO Q 10) 100 MG CAPS  Self Yes No   Sig: Take by mouth daily    LUTEIN PO  Self Yes No   Sig: Take 2 tablets by mouth daily   Multiple Vitamin (MULTIVITAMIN) tablet  Self Yes No   Sig: Take 1 tablet by mouth daily   Omega-3 Fatty Acids (FISH OIL) 1200 MG CPDR  Self Yes No   Sig: Take by mouth daily    carbamide peroxide (DEBROX) 6.5 % otic solution   No No   Sig: Administer 5 drops to the right ear 2 (two) times a day   chlorhexidine (PERIDEX) 0.12 % solution   No No   Sig: Apply 15 mL to the mouth or throat every 12 (twelve) hours   cyanocobalamin (VITAMIN B-12) 1,000 mcg tablet  Self Yes No   Sig: Take 3 tablets by mouth daily      Facility-Administered Medications: None     Discharge Medication List as of 11/11/2024  3:18 AM        START taking these medications    Details   oxyCODONE (Roxicodone) 5 immediate release tablet Take 1 tablet (5 mg total) by mouth every 6 (six) hours as needed for moderate pain or severe pain for up to 20 doses Max Daily Amount: 20 mg, Starting Mon 11/11/2024, Normal      tamsulosin (FLOMAX) 0.4 mg Take 1 capsule (0.4 mg total) by mouth daily with dinner for 14 days, Starting Mon 11/11/2024, Until Mon 11/25/2024, Normal           CONTINUE these medications which have NOT CHANGED    Details   Ascorbic Acid (VITAMIN C PO) Take by mouth daily , Historical Med      carbamide peroxide (DEBROX) 6.5 % otic solution Administer 5 drops to the right ear 2 (two) times a day, Starting Fri 2/18/2022, Normal      chlorhexidine (PERIDEX) 0.12 % solution Apply 15 mL to the mouth or throat every 12 (twelve) hours, Starting Tue 9/24/2024, Normal      Cholecalciferol (VITAMIN D3) 2000 units capsule Take by mouth daily , Historical Med      Coenzyme Q10 (CO Q 10) 100 MG CAPS Take by mouth daily , Historical Med      cyanocobalamin (VITAMIN  B-12) 1,000 mcg tablet Take 3 tablets by mouth daily, Historical Med      LUTEIN PO Take 2 tablets by mouth daily, Historical Med      Multiple Vitamin (MULTIVITAMIN) tablet Take 1 tablet by mouth daily, Historical Med      Omega-3 Fatty Acids (FISH OIL) 1200 MG CPDR Take by mouth daily , Historical Med             ED SEPSIS DOCUMENTATION   Time reflects when diagnosis was documented in both MDM as applicable and the Disposition within this note       Time User Action Codes Description Comment    11/11/2024  3:14 AM Alvaro Martinez [N20.1] Ureteral calculus     11/11/2024  3:15 AM Cedric Almanza [N20.0] Kidney stone on left side                  Alvaro Martinez MD  11/11/24 0456

## 2024-11-14 NOTE — TELEPHONE ENCOUNTER
Patient is calling to report that he has continued pain.  Left flank pain usually 5/10; from 8mm stone;  taking oxycodone and flomax;  will increase water;  urine is darker yellow.      Hydration/ER recommendations reviewed    Rescheduled patient for sooner NEW patient visit with Dr. Hutchison 11/15/24.   Confirmed address and time.

## 2024-11-14 NOTE — ED ATTENDING ATTESTATION
11/10/2024  ICedric MD, saw and evaluated the patient. I have discussed the patient with the resident/non-physician practitioner and agree with the resident's/non-physician practitioner's findings, Plan of Care, and MDM as documented in the resident's/non-physician practitioner's note, except where noted. All available labs and Radiology studies were reviewed.  I was present for key portions of any procedure(s) performed by the resident/non-physician practitioner and I was immediately available to provide assistance.       At this point I agree with the current assessment done in the Emergency Department.  I have conducted an independent evaluation of this patient a history and physical is as follows:see  h and p above     ED Course  ED Course as of 11/13/24 1906   Sun Nov 10, 2024   2358 Er md procedure note for bedside transabd/us/- no r hydronephrosis/ free/fluid- pos left hydronephrosis- no free- no aaa   Mon Nov 11, 2024   0054 Er md note- pt seen and thoroughly evaluated by er md -- case d/w er resident - 4/24 ct scan of abd/pelvis report reviewed by er md- - 74 yr mal;e with previous hx of kidney stones - has passed all but one-- c/o sudden onset this afternoon of sudden left sided flank pain - similar to previous kidney stones type pain -- no fevers- no nv//d- no gu comps- avss- htnsive- in nad- pulse ox 95 % on ra- interpretation is normal- no intervention - rrr s1/s2-cta-b/-soft abd- no left cva tenderness- mid left flank tenderness- normal testicle/scrotal/perineal exam    0119 Er md note- pt- re-eval- states pain is getting worse- is agreeable to additional pain medication will order-- and cont to re-eval pending ct scan report          Critical Care Time  Procedures

## 2024-11-15 ENCOUNTER — OFFICE VISIT (OUTPATIENT)
Dept: UROLOGY | Facility: CLINIC | Age: 74
End: 2024-11-15
Payer: COMMERCIAL

## 2024-11-15 VITALS
BODY MASS INDEX: 25.47 KG/M2 | HEIGHT: 72 IN | HEART RATE: 84 BPM | WEIGHT: 188 LBS | SYSTOLIC BLOOD PRESSURE: 136 MMHG | OXYGEN SATURATION: 96 % | DIASTOLIC BLOOD PRESSURE: 70 MMHG

## 2024-11-15 DIAGNOSIS — N20.1 LEFT URETERAL CALCULUS: Primary | ICD-10-CM

## 2024-11-15 LAB
SL AMB  POCT GLUCOSE, UA: NORMAL
SL AMB LEUKOCYTE ESTERASE,UA: NORMAL
SL AMB POCT BILIRUBIN,UA: NORMAL
SL AMB POCT BLOOD,UA: NORMAL
SL AMB POCT CLARITY,UA: NORMAL
SL AMB POCT COLOR,UA: YELLOW
SL AMB POCT KETONES,UA: 80
SL AMB POCT NITRITE,UA: NORMAL
SL AMB POCT PH,UA: 5
SL AMB POCT SPECIFIC GRAVITY,UA: 1.01
SL AMB POCT URINE PROTEIN: NORMAL
SL AMB POCT UROBILINOGEN: 0.2

## 2024-11-15 PROCEDURE — 99214 OFFICE O/P EST MOD 30 MIN: CPT

## 2024-11-15 PROCEDURE — 87086 URINE CULTURE/COLONY COUNT: CPT

## 2024-11-15 PROCEDURE — 81002 URINALYSIS NONAUTO W/O SCOPE: CPT

## 2024-11-15 RX ORDER — IBUPROFEN 600 MG/1
600 TABLET, FILM COATED ORAL EVERY 6 HOURS SCHEDULED
Qty: 28 TABLET | Refills: 0 | Status: SHIPPED | OUTPATIENT
Start: 2024-11-15 | End: 2024-11-22

## 2024-11-15 RX ORDER — ACETAMINOPHEN 500 MG
1000 TABLET ORAL EVERY 6 HOURS SCHEDULED
Qty: 56 TABLET | Refills: 0 | Status: SHIPPED | OUTPATIENT
Start: 2024-11-15 | End: 2024-11-22

## 2024-11-15 NOTE — TELEPHONE ENCOUNTER
Patient's spouse called in stating she forgot to ask provider a question at his appointment today. She is asking about the risks of hydroureteronephrosis and the effect it has on his kidneys while awaiting for a surgery date. She wants to make sure his kidneys won't be affected. Please advise.

## 2024-11-15 NOTE — H&P (VIEW-ONLY)
Ambulatory Visit  Name: Cody Fenton      : 1950      MRN: [id number]  Encounter Provider: Brian Barrientos MD  Encounter Date: 11/15/2024   Encounter department: [facility]OLOG[facility] [place]    Assessment & Plan  Left ureteral calculus  I explained the urinary tract anatomy and stone burden. I discussed the natural history of spontaneous passage of ureteral calculi.  I explained that the two most important factors determining successful passage include stone size (smaller stones more likely to pass than larger) and stone location (stones in the distal ureter are more likely to pass than stones in the proximal ureter).  I explained that there is interplay between these 2 factors.  I explained that requirements for a trial of passage include stable renal function, absence of UTI/sepsis, adequate pain control with oral analgesics, and ability to tolerate oral intake.  Studies suggest that most stones that do successfully pass spontaneously will do so within 40 days of detection.  After this time the likelihood of spontaneous passage declines and surgery is necessary to lower the risk of: chronic pain, UTI, stone impaction and possible ureteral stricture formation, and irreversible loss of renal function.      The patient would not like to try to pass this stone and elects for treatment which is quite reasonable given size and location.     We discussed ureteroscopy (URS).    I explained the procedure of ureteroscopy. I explained that with ureteroscopy laser lithotripsy is often used to fragment and dust the stone(s) into smaller pieces that can be both actively extracted and pass spontaneously. A combination of laser setting is used to most efficiently and effectively treat the stone(s) in question. I explained that given the need to traverse the ureter a ureteral stent is often left indwelling at the end of the procedure and the stent is removed anywhere from 1-4 weeks afterwards via cystoscopy,  tether, or a snare device. I explained the risks of ureteroscopy include, but are not limited to bleeding possibly requiring blood transfusion, pain, infection with possible sequela such as sepsis, septic shock, and even death, failure to access the kidney due to a naturally narrow ureter with need for stent insertion and rescheduling of definitive treatment, failure to treat all the stone at one setting and the need for a secondary procedure, potential injury to the urethra, bladder, ureter or kidneys, leading all the way up to the most severe complication of kidney loss, possible stricture formation in the urethra, possible stricture formation in the ureter, possible need for long-term drainage with stent or nephrostomy tube, and possible need for reconstructive surgery in the future if a stricture forms in a delayed fashion.    - Will schedule for left ureteroscopy next available   - urine culture collected today and consent signed  - ED precautions reviewed     Orders:    POCT urine dip    Urine culture    Case request operating room: CYSTOSCOPY URETEROSCOPY WITH LITHOTRIPSY HOLMIUM LASER, RETROGRADE PYELOGRAM AND INSERTION STENT URETERAL; Standing    acetaminophen (TYLENOL) 500 mg tablet; Take 2 tablets (1,000 mg total) by mouth every 6 (six) hours for 7 days    ibuprofen (MOTRIN) 600 mg tablet; Take 1 tablet (600 mg total) by mouth every 6 (six) hours for 7 days      History of Present Illness     Cody Fenton is a 74 y.o. male who presents for evaluation of nephrolithiasis. He recently had an ER visit and was found to have a left proximal 8mm ureteral stone with obstruction. He was discharged with pain medication and followup. He is still experiencing symptoms.     He has had a number of stones before and has had ureteroscopy and stents. He has followed with Dr. Guerra for his stones.     He desires treatment for this stone rather than medical passage.     Denies fevers and dysuria.       History obtained  from : patient    AUA SYMPTOM SCORE      Flowsheet Row Most Recent Value   AUA SYMPTOM SCORE    How often have you had a sensation of not emptying your bladder completely after you finished urinating? 3 (P)     How often have you had to urinate again less than two hours after you finished urinating? 5 (P)     How often have you found you stopped and started again several times when you urinate? 5 (P)     How often have you found it difficult to postpone urination? 3 (P)     How often have you had a weak urinary stream? 5 (P)     How often have you had to push or strain to begin urination? 4 (P)     How many times did you most typically get up to urinate from the time you went to bed at night until the time you got up in the morning? 5 (P)     Quality of Life: If you were to spend the rest of your life with your urinary condition just the way it is now, how would you feel about that? 2 (P)     AUA SYMPTOM SCORE 30 (P)              Objective     /70 (BP Location: Left arm, Patient Position: Sitting, Cuff Size: Standard)   Pulse 84   Ht 6' (1.829 m)   Wt 85.3 kg (188 lb)   SpO2 96%   BMI 25.50 kg/m²   Physical Exam  Vitals:    11/15/24 0959   BP: 136/70   Pulse: 84   SpO2: 96%      General: Well appearing, no acute distress   HEENT: normocephalic, atraumatic  Eyes: extraocular movements intact  Cardiovascular: normal rate   Respiratory: no respiratory distress, effort normal   Abdominal: Non-distended, non-tender   : Deferred  MSK: Grossly normal range of motion   Neurological: No gross focal deficits  Behavioral: Normal mood and affect     Results  Lab Results   Component Value Date    PSA 1.26 10/05/2023    PSA 1.2 07/09/2020    PSA 1.2 07/05/2019     Lab Results   Component Value Date    GLUCOSE 98 08/19/2015    CALCIUM 9.5 11/11/2024     08/19/2015    K 4.2 11/11/2024    CO2 19 (L) 11/11/2024     11/11/2024    BUN 23 11/11/2024    CREATININE 1.22 11/11/2024     Lab Results   Component Value  Date    WBC 12.91 (H) 11/11/2024    HGB 14.6 11/11/2024    HCT 45.4 11/11/2024    MCV 90 11/11/2024     11/11/2024       Imaging  I have personally reviewed pertinent films in PACS and my interpretation is Recent CT shows a proximal 8mm obstructing left ureteral stone with punctate non-obstructing stones in the kidney.    CT ABDOMEN PELVIS W CONTRAST  8 mm proximal left ureteral calculus causing moderate hydroureteronephrosis. There is moderate perinephric fluid and fat stranding.    The study was marked in EPIC for immediate notification.      Workstation performed: [id number]      CT ABDOMEN PELVIS W CONTRAST  1.  Moderate left-sided hydroureteronephrosis secondary to a 4 mm mid left ureteral calculus. Additional punctate calculus seen just upstream as well as multiple left renal calculi measuring up to 11 mm.    2.  Diffuse hepatic steatosis.    The study was marked in EPIC for immediate notification.      Workstation performed: [id number]      FL RETROGRADE PYELOGRAM  Fluoroscopic guidance provided for retrograde pyelogram and ureteral stent placement. Please see procedure report for further details.    Workstation performed: [id number]      Office Urine Dip  No results found for this or any previous visit (from the past hour).]    Administrative Statements

## 2024-11-15 NOTE — PROGRESS NOTES
Ambulatory Visit  Name: Cody Fenton      : 1950      MRN: [id number]  Encounter Provider: Brian Barrientos MD  Encounter Date: 11/15/2024   Encounter department: [facility] [place]    Assessment & Plan  Left ureteral calculus  I explained the urinary tract anatomy and stone burden. I discussed the natural history of spontaneous passage of ureteral calculi.  I explained that the two most important factors determining successful passage include stone size (smaller stones more likely to pass than larger) and stone location (stones in the distal ureter are more likely to pass than stones in the proximal ureter).  I explained that there is interplay between these 2 factors.  I explained that requirements for a trial of passage include stable renal function, absence of UTI/sepsis, adequate pain control with oral analgesics, and ability to tolerate oral intake.  Studies suggest that most stones that do successfully pass spontaneously will do so within 40 days of detection.  After this time the likelihood of spontaneous passage declines and surgery is necessary to lower the risk of: chronic pain, UTI, stone impaction and possible ureteral stricture formation, and irreversible loss of renal function.      The patient would not like to try to pass this stone and elects for treatment which is quite reasonable given size and location.     We discussed ureteroscopy (URS).    I explained the procedure of ureteroscopy. I explained that with ureteroscopy laser lithotripsy is often used to fragment and dust the stone(s) into smaller pieces that can be both actively extracted and pass spontaneously. A combination of laser setting is used to most efficiently and effectively treat the stone(s) in question. I explained that given the need to traverse the ureter a ureteral stent is often left indwelling at the end of the procedure and the stent is removed anywhere from 1-4 weeks afterwards via cystoscopy,  tether, or a snare device. I explained the risks of ureteroscopy include, but are not limited to bleeding possibly requiring blood transfusion, pain, infection with possible sequela such as sepsis, septic shock, and even death, failure to access the kidney due to a naturally narrow ureter with need for stent insertion and rescheduling of definitive treatment, failure to treat all the stone at one setting and the need for a secondary procedure, potential injury to the urethra, bladder, ureter or kidneys, leading all the way up to the most severe complication of kidney loss, possible stricture formation in the urethra, possible stricture formation in the ureter, possible need for long-term drainage with stent or nephrostomy tube, and possible need for reconstructive surgery in the future if a stricture forms in a delayed fashion.    - Will schedule for left ureteroscopy next available   - urine culture collected today and consent signed  - ED precautions reviewed     Orders:    POCT urine dip    Urine culture    Case request operating room: CYSTOSCOPY URETEROSCOPY WITH LITHOTRIPSY HOLMIUM LASER, RETROGRADE PYELOGRAM AND INSERTION STENT URETERAL; Standing    acetaminophen (TYLENOL) 500 mg tablet; Take 2 tablets (1,000 mg total) by mouth every 6 (six) hours for 7 days    ibuprofen (MOTRIN) 600 mg tablet; Take 1 tablet (600 mg total) by mouth every 6 (six) hours for 7 days      History of Present Illness     Cody Fenton is a 74 y.o. male who presents for evaluation of nephrolithiasis. He recently had an ER visit and was found to have a left proximal 8mm ureteral stone with obstruction. He was discharged with pain medication and followup. He is still experiencing symptoms.     He has had a number of stones before and has had ureteroscopy and stents. He has followed with Dr. Guerra for his stones.     He desires treatment for this stone rather than medical passage.     Denies fevers and dysuria.       History obtained  from : patient    AUA SYMPTOM SCORE      Flowsheet Row Most Recent Value   AUA SYMPTOM SCORE    How often have you had a sensation of not emptying your bladder completely after you finished urinating? 3 (P)     How often have you had to urinate again less than two hours after you finished urinating? 5 (P)     How often have you found you stopped and started again several times when you urinate? 5 (P)     How often have you found it difficult to postpone urination? 3 (P)     How often have you had a weak urinary stream? 5 (P)     How often have you had to push or strain to begin urination? 4 (P)     How many times did you most typically get up to urinate from the time you went to bed at night until the time you got up in the morning? 5 (P)     Quality of Life: If you were to spend the rest of your life with your urinary condition just the way it is now, how would you feel about that? 2 (P)     AUA SYMPTOM SCORE 30 (P)              Objective     /70 (BP Location: Left arm, Patient Position: Sitting, Cuff Size: Standard)   Pulse 84   Ht 6' (1.829 m)   Wt 85.3 kg (188 lb)   SpO2 96%   BMI 25.50 kg/m²   Physical Exam  Vitals:    11/15/24 0959   BP: 136/70   Pulse: 84   SpO2: 96%      General: Well appearing, no acute distress   HEENT: normocephalic, atraumatic  Eyes: extraocular movements intact  Cardiovascular: normal rate   Respiratory: no respiratory distress, effort normal   Abdominal: Non-distended, non-tender   : Deferred  MSK: Grossly normal range of motion   Neurological: No gross focal deficits  Behavioral: Normal mood and affect     Results  Lab Results   Component Value Date    PSA 1.26 10/05/2023    PSA 1.2 07/09/2020    PSA 1.2 07/05/2019     Lab Results   Component Value Date    GLUCOSE 98 08/19/2015    CALCIUM 9.5 11/11/2024     08/19/2015    K 4.2 11/11/2024    CO2 19 (L) 11/11/2024     11/11/2024    BUN 23 11/11/2024    CREATININE 1.22 11/11/2024     Lab Results   Component Value  Date    WBC 12.91 (H) 11/11/2024    HGB 14.6 11/11/2024    HCT 45.4 11/11/2024    MCV 90 11/11/2024     11/11/2024       Imaging  I have personally reviewed pertinent films in PACS and my interpretation is Recent CT shows a proximal 8mm obstructing left ureteral stone with punctate non-obstructing stones in the kidney.    CT ABDOMEN PELVIS W CONTRAST  8 mm proximal left ureteral calculus causing moderate hydroureteronephrosis. There is moderate perinephric fluid and fat stranding.    The study was marked in EPIC for immediate notification.      Workstation performed: [id number]      CT ABDOMEN PELVIS W CONTRAST  1.  Moderate left-sided hydroureteronephrosis secondary to a 4 mm mid left ureteral calculus. Additional punctate calculus seen just upstream as well as multiple left renal calculi measuring up to 11 mm.    2.  Diffuse hepatic steatosis.    The study was marked in EPIC for immediate notification.      Workstation performed: [id number]      FL RETROGRADE PYELOGRAM  Fluoroscopic guidance provided for retrograde pyelogram and ureteral stent placement. Please see procedure report for further details.    Workstation performed: [id number]      Office Urine Dip  No results found for this or any previous visit (from the past hour).]    Administrative Statements

## 2024-11-15 NOTE — ASSESSMENT & PLAN NOTE
I explained the urinary tract anatomy and stone burden. I discussed the natural history of spontaneous passage of ureteral calculi.  I explained that the two most important factors determining successful passage include stone size (smaller stones more likely to pass than larger) and stone location (stones in the distal ureter are more likely to pass than stones in the proximal ureter).  I explained that there is interplay between these 2 factors.  I explained that requirements for a trial of passage include stable renal function, absence of UTI/sepsis, adequate pain control with oral analgesics, and ability to tolerate oral intake.  Studies suggest that most stones that do successfully pass spontaneously will do so within 40 days of detection.  After this time the likelihood of spontaneous passage declines and surgery is necessary to lower the risk of: chronic pain, UTI, stone impaction and possible ureteral stricture formation, and irreversible loss of renal function.      The patient would not like to try to pass this stone and elects for treatment which is quite reasonable given size and location.     We discussed ureteroscopy (URS).    I explained the procedure of ureteroscopy. I explained that with ureteroscopy laser lithotripsy is often used to fragment and dust the stone(s) into smaller pieces that can be both actively extracted and pass spontaneously. A combination of laser setting is used to most efficiently and effectively treat the stone(s) in question. I explained that given the need to traverse the ureter a ureteral stent is often left indwelling at the end of the procedure and the stent is removed anywhere from 1-4 weeks afterwards via cystoscopy, tether, or a snare device. I explained the risks of ureteroscopy include, but are not limited to bleeding possibly requiring blood transfusion, pain, infection with possible sequela such as sepsis, septic shock, and even death, failure to access the kidney  due to a naturally narrow ureter with need for stent insertion and rescheduling of definitive treatment, failure to treat all the stone at one setting and the need for a secondary procedure, potential injury to the urethra, bladder, ureter or kidneys, leading all the way up to the most severe complication of kidney loss, possible stricture formation in the urethra, possible stricture formation in the ureter, possible need for long-term drainage with stent or nephrostomy tube, and possible need for reconstructive surgery in the future if a stricture forms in a delayed fashion.    - Will schedule for left ureteroscopy next available   - urine culture collected today and consent signed  - ED precautions reviewed     Orders:    POCT urine dip    Urine culture    Case request operating room: CYSTOSCOPY URETEROSCOPY WITH LITHOTRIPSY HOLMIUM LASER, RETROGRADE PYELOGRAM AND INSERTION STENT URETERAL; Standing    acetaminophen (TYLENOL) 500 mg tablet; Take 2 tablets (1,000 mg total) by mouth every 6 (six) hours for 7 days    ibuprofen (MOTRIN) 600 mg tablet; Take 1 tablet (600 mg total) by mouth every 6 (six) hours for 7 days

## 2024-11-16 LAB — BACTERIA UR CULT: NORMAL

## 2024-11-18 ENCOUNTER — TELEPHONE (OUTPATIENT)
Age: 74
End: 2024-11-18

## 2024-11-18 NOTE — TELEPHONE ENCOUNTER
Patient of DR. Barrientos     Patient called stating he is to call to schedule a procedure and needs to speak to SS.    Patient can be reached at [phone]

## 2024-11-22 NOTE — PRE-PROCEDURE INSTRUCTIONS
Pre-Surgery Instructions:   Medication Instructions    acetaminophen (TYLENOL) 500 mg tablet Uses PRN- OK to take day of surgery    Ascorbic Acid (VITAMIN C PO) Stop taking 7 days prior to surgery.    Cholecalciferol (VITAMIN D3) 2000 units capsule Stop taking 7 days prior to surgery.    Coenzyme Q10 (CO Q 10) 100 MG CAPS Stop taking 7 days prior to surgery.    cyanocobalamin (VITAMIN B-12) 1,000 mcg tablet Stop taking 7 days prior to surgery.    ibuprofen (MOTRIN) 600 mg tablet Stop taking 3 days prior to surgery.    LUTEIN PO Stop taking 7 days prior to surgery.    Omega-3 Fatty Acids (FISH OIL) 1200 MG CPDR Take day of surgery.    oxyCODONE (Roxicodone) 5 immediate release tablet Uses PRN- OK to take day of surgery    tamsulosin (FLOMAX) 0.4 mg Take night before surgery    Medication instructions for day surgery reviewed. Please use only a sip of water to take your instructed medications. Avoid all over the counter vitamins, supplements and NSAIDS for one week prior to surgery per anesthesia guidelines. Tylenol is ok to take as needed.     You will receive a call one business day prior to surgery with an arrival time and hospital directions. If your surgery is scheduled on a Monday, the hospital will be calling you on the Friday prior to your surgery. If you have not heard from anyone by 8pm, please call the hospital supervisor through the hospital  at [phone]. ([place] [phone] or [place] [phone]).    Do not eat or drink anything after midnight the night before your surgery, including candy, mints, lifesavers, or chewing gum. Do not drink alcohol 24hrs before your surgery. Try not to smoke at least 24hrs before your surgery.       Follow the pre surgery showering instructions as listed in the “My Surgical Experience Booklet” or otherwise provided by your surgeon's office. Do not use a blade to shave the surgical area 1 week before surgery. It is okay to use a clean electric clippers up to  24 hours before surgery. Do not apply any lotions, creams, including makeup, cologne, deodorant, or perfumes after showering on the day of your surgery. Do not use dry shampoo, hair spray, hair gel, or any type of hair products.     No contact lenses, eye make-up, or artificial eyelashes. Remove nail polish, including gel polish, and any artificial, gel, or acrylic nails if possible. Remove all jewelry including rings and body piercing jewelry.     Wear causal clothing that is easy to take on and off. Consider your type of surgery.    Keep any valuables, jewelry, piercings at home. Please bring any specially ordered equipment (sling, braces) if indicated.    Arrange for a responsible person to drive you to and from the hospital on the day of your surgery. Please confirm the visitor policy for the day of your procedure when you receive your phone call with an arrival time.     Call the surgeon's office with any new illnesses, exposures, or additional questions prior to surgery.    Please reference your “My Surgical Experience Booklet” for additional information to prepare for your upcoming surgery.

## 2024-11-22 NOTE — TELEPHONE ENCOUNTER
Patient wife calling to get surgery scheduled, They are concerned for the hydronephrosis    Taking the Tylenol and Ibuprofen and it is effective but concerned that he has to take it more often due to pain. Today is day 7 of alternating with the Tylenol and Ibuprofen. Is he to continue this regimen, at last OV it stated to do it for 7 days.    Pain level was a 6 out 10 this morning and took the medication and it is a 3 out of 10    Please advise on when patient can expect the surgery date     Encouraged to increase water, decrease bladder irritants, ED precautions reviewed    CB #[phone], [place]

## 2024-11-22 NOTE — TELEPHONE ENCOUNTER
Spoke with patient and confirmed surgery date of  11/29/24  Type of surgery:Ureteroscopy  Operating physician:Dr. Barrientos  Location of surgery: [place] OR    Verbally went over prep with patient on 11/22/24  NPO  Bowel prep? No  Hospital calls afternoon prior with arrival time (calls Friday afternoon for Monday surgery)  Patient needs ride to and from surgery   outpatient  Pre-op testing to be done 2 weeks prior to surgery. All testing can be done as a walk-in. EKG can only be done as a walk-in at any [facility].  none  Blood thinners:   None  Clearances needed: None      Consent: in media

## 2024-11-29 ENCOUNTER — ANESTHESIA EVENT (OUTPATIENT)
Dept: PERIOP | Facility: HOSPITAL | Age: 74
End: 2024-11-29
Payer: COMMERCIAL

## 2024-11-29 ENCOUNTER — APPOINTMENT (OUTPATIENT)
Dept: RADIOLOGY | Facility: HOSPITAL | Age: 74
End: 2024-11-29
Payer: COMMERCIAL

## 2024-11-29 ENCOUNTER — ANESTHESIA (OUTPATIENT)
Dept: PERIOP | Facility: HOSPITAL | Age: 74
End: 2024-11-29
Payer: COMMERCIAL

## 2024-11-29 ENCOUNTER — HOSPITAL ENCOUNTER (OUTPATIENT)
Facility: HOSPITAL | Age: 74
Setting detail: OUTPATIENT SURGERY
Discharge: HOME | End: 2024-11-29
Payer: COMMERCIAL

## 2024-11-29 VITALS
RESPIRATION RATE: 18 BRPM | DIASTOLIC BLOOD PRESSURE: 87 MMHG | TEMPERATURE: 97.1 F | OXYGEN SATURATION: 98 % | SYSTOLIC BLOOD PRESSURE: 138 MMHG | BODY MASS INDEX: 25.47 KG/M2 | HEART RATE: 63 BPM | WEIGHT: 188 LBS | HEIGHT: 72 IN

## 2024-11-29 DIAGNOSIS — N20.1 LEFT URETERAL CALCULUS: ICD-10-CM

## 2024-11-29 PROCEDURE — C1758 CATHETER, URETERAL: HCPCS

## 2024-11-29 PROCEDURE — C1894 INTRO/SHEATH, NON-LASER: HCPCS

## 2024-11-29 PROCEDURE — 52356 CYSTO/URETERO W/LITHOTRIPSY: CPT

## 2024-11-29 PROCEDURE — 82360 CALCULUS ASSAY QUANT: CPT

## 2024-11-29 PROCEDURE — 74420 UROGRAPHY RTRGR +-KUB: CPT

## 2024-11-29 PROCEDURE — C1769 GUIDE WIRE: HCPCS

## 2024-11-29 PROCEDURE — C2625 STENT, NON-COR, TEM W/DEL SY: HCPCS

## 2024-11-29 PROCEDURE — 87086 URINE CULTURE/COLONY COUNT: CPT

## 2024-11-29 DEVICE — INLAY OPTIMA URETERAL STENT W/O GUIDEWIRE
Type: IMPLANTABLE DEVICE | Site: URETER | Status: FUNCTIONAL
Brand: BARD® INLAY OPTIMA® URETERAL STENT

## 2024-11-29 RX ORDER — ONDANSETRON 2 MG/ML
INJECTION INTRAMUSCULAR; INTRAVENOUS AS NEEDED
Status: DISCONTINUED | OUTPATIENT
Start: 2024-11-29 | End: 2024-11-29

## 2024-11-29 RX ORDER — MAGNESIUM HYDROXIDE 1200 MG/15ML
LIQUID ORAL AS NEEDED
Status: DISCONTINUED | OUTPATIENT
Start: 2024-11-29 | End: 2024-11-29 | Stop reason: HOSPADM

## 2024-11-29 RX ORDER — FENTANYL CITRATE/PF 50 MCG/ML
50 SYRINGE (ML) INJECTION
Status: DISCONTINUED | OUTPATIENT
Start: 2024-11-29 | End: 2024-11-29 | Stop reason: HOSPADM

## 2024-11-29 RX ORDER — KETOROLAC TROMETHAMINE 30 MG/ML
INJECTION, SOLUTION INTRAMUSCULAR; INTRAVENOUS AS NEEDED
Status: DISCONTINUED | OUTPATIENT
Start: 2024-11-29 | End: 2024-11-29

## 2024-11-29 RX ORDER — CEFAZOLIN SODIUM 2 G/50ML
2000 SOLUTION INTRAVENOUS ONCE
Status: COMPLETED | OUTPATIENT
Start: 2024-11-29 | End: 2024-11-29

## 2024-11-29 RX ORDER — SODIUM CHLORIDE, SODIUM LACTATE, POTASSIUM CHLORIDE, CALCIUM CHLORIDE 600; 310; 30; 20 MG/100ML; MG/100ML; MG/100ML; MG/100ML
INJECTION, SOLUTION INTRAVENOUS CONTINUOUS PRN
Status: DISCONTINUED | OUTPATIENT
Start: 2024-11-29 | End: 2024-11-29

## 2024-11-29 RX ORDER — FENTANYL CITRATE 50 UG/ML
INJECTION, SOLUTION INTRAMUSCULAR; INTRAVENOUS AS NEEDED
Status: DISCONTINUED | OUTPATIENT
Start: 2024-11-29 | End: 2024-11-29

## 2024-11-29 RX ORDER — SODIUM CHLORIDE, SODIUM LACTATE, POTASSIUM CHLORIDE, CALCIUM CHLORIDE 600; 310; 30; 20 MG/100ML; MG/100ML; MG/100ML; MG/100ML
20 INJECTION, SOLUTION INTRAVENOUS CONTINUOUS
Status: DISCONTINUED | OUTPATIENT
Start: 2024-11-29 | End: 2024-11-29 | Stop reason: HOSPADM

## 2024-11-29 RX ORDER — HYDROMORPHONE HCL/PF 1 MG/ML
0.5 SYRINGE (ML) INJECTION
Status: DISCONTINUED | OUTPATIENT
Start: 2024-11-29 | End: 2024-11-29 | Stop reason: HOSPADM

## 2024-11-29 RX ORDER — LIDOCAINE HYDROCHLORIDE 10 MG/ML
INJECTION, SOLUTION EPIDURAL; INFILTRATION; INTRACAUDAL; PERINEURAL AS NEEDED
Status: DISCONTINUED | OUTPATIENT
Start: 2024-11-29 | End: 2024-11-29

## 2024-11-29 RX ORDER — SODIUM CHLORIDE 9 MG/ML
INJECTION, SOLUTION INTRAVENOUS CONTINUOUS PRN
Status: DISCONTINUED | OUTPATIENT
Start: 2024-11-29 | End: 2024-11-29

## 2024-11-29 RX ORDER — ONDANSETRON 2 MG/ML
4 INJECTION INTRAMUSCULAR; INTRAVENOUS ONCE AS NEEDED
Status: DISCONTINUED | OUTPATIENT
Start: 2024-11-29 | End: 2024-11-29 | Stop reason: HOSPADM

## 2024-11-29 RX ORDER — PROPOFOL 10 MG/ML
INJECTION, EMULSION INTRAVENOUS AS NEEDED
Status: DISCONTINUED | OUTPATIENT
Start: 2024-11-29 | End: 2024-11-29

## 2024-11-29 RX ADMIN — LIDOCAINE HYDROCHLORIDE 50 MG: 10 INJECTION, SOLUTION EPIDURAL; INFILTRATION; INTRACAUDAL; PERINEURAL at 08:47

## 2024-11-29 RX ADMIN — SODIUM CHLORIDE: 0.9 INJECTION, SOLUTION INTRAVENOUS at 10:13

## 2024-11-29 RX ADMIN — SODIUM CHLORIDE, SODIUM LACTATE, POTASSIUM CHLORIDE, AND CALCIUM CHLORIDE: .6; .31; .03; .02 INJECTION, SOLUTION INTRAVENOUS at 08:42

## 2024-11-29 RX ADMIN — FENTANYL CITRATE 50 MCG: 50 INJECTION INTRAMUSCULAR; INTRAVENOUS at 09:08

## 2024-11-29 RX ADMIN — FENTANYL CITRATE 25 MCG: 50 INJECTION INTRAMUSCULAR; INTRAVENOUS at 09:35

## 2024-11-29 RX ADMIN — SODIUM CHLORIDE, SODIUM LACTATE, POTASSIUM CHLORIDE, AND CALCIUM CHLORIDE 20 ML/HR: .6; .31; .03; .02 INJECTION, SOLUTION INTRAVENOUS at 07:40

## 2024-11-29 RX ADMIN — FENTANYL CITRATE 25 MCG: 50 INJECTION INTRAMUSCULAR; INTRAVENOUS at 10:09

## 2024-11-29 RX ADMIN — CEFAZOLIN SODIUM 2000 MG: 2 SOLUTION INTRAVENOUS at 08:52

## 2024-11-29 RX ADMIN — PROPOFOL 50 MG: 10 INJECTION, EMULSION INTRAVENOUS at 08:49

## 2024-11-29 RX ADMIN — PROPOFOL 150 MG: 10 INJECTION, EMULSION INTRAVENOUS at 08:47

## 2024-11-29 RX ADMIN — KETOROLAC TROMETHAMINE 15 MG: 30 INJECTION, SOLUTION INTRAMUSCULAR; INTRAVENOUS at 10:12

## 2024-11-29 RX ADMIN — ONDANSETRON 4 MG: 2 INJECTION INTRAMUSCULAR; INTRAVENOUS at 10:09

## 2024-11-29 NOTE — INTERVAL H&P NOTE
H&P reviewed. After examining the patient I find no changes in the patients condition since the H&P had been written.    Vitals:    11/29/24 0707   BP: 129/94   Pulse: 62   Resp: 18   Temp: 98.2 °F (36.8 °C)   SpO2: 97%

## 2024-11-29 NOTE — OP NOTE
PATIENT:  Cody Fenton (MRN [id number])    DATE OF PROCEDURE:   11/29/2024    PRE-OP DIAGNOSES:   Left ureteral calculus [N20.1]     POST-OP DIAGNOSES AND OPERATIVE FINDINGS:   * No post-op diagnosis entered *    PROCEDURES:  Cystoscopy   Left retrograde pyelogram   Left ureteroscopy with laser lithotripsy   Left ureteral stent placement     SURGEON:  Brian Barrientos MD    ASSISTANTS:  Surgeons and Role:     * Brian Barrientos MD - Primary  Circulator: Divine Webster RN; Shanell Gannon RN  Relief Circulator: Divine Kelly RN  Scrub Person: Courtney Brown RN    NOTE:  There were no qualified teaching residents to assist with this case    ANESTHESIA TYPE:  General anesthesia    ESTIMATED BLOOD LOSS: <5 mL  URINE OUTPUT: Unable to measure     COMPLICATIONS:   None    SPECIMENS:   ID Type Source Tests Collected by Time Destination   A :  Urine Urine, Renal, Left URINE CULTURE Brian Barrientos MD 11/29/2024 0917    B : Left ureteral stone Calculus Ureter, Left STONE ANALYSIS Brian Barrientos MD 11/29/2024 0937          ANTIBIOTICS:  Cefazolin    INTRAOPERATIVE THROMBOEMBOLISM PROPHYLAXIS:  Pneumatic compression stockings       DRAINS:   L 6x26 cm JJ ureteral stent on an external tether       FINDINGS  No meatal stenosis  No urethral strictures  Prostate mildly enlarged  Bilateral ureteral orifices in orthotopic positions  Bladder lesions: None  Bladder stones: None  Trabeculations: none  Diverticula: None   left retrograde pyelogram: severe hydronephrosis, no filling defects.  fluoroscopy showed a radiopacity in the mid ureter consistent with the known stone.   left distal and mid ureteroscopy with semirigid ureteroscope did not demonstrate any stone fragments. The stone was visualized in the mid to proximal ureter and retropulsed into the kidney for safer treatment  left pyeloscopy: No residual stone fragments larger than the tip of the stone basket on careful reductive pyeloscopy   Successfully placed  6 x 26 double-J ureteral stent  with string attached       INDICATIONS   Cody Fenton is a 74 y.o. male with left proximal ureter. Stone with obstruction. They present for ureteroscopy and laser lithotripsy as definitive stone management. After discussion of surgical treatment options, the patient elected to proceed.     PROCEDURES IN DETAIL   Patient identified in the preop holding area.  Consent was obtained.  Risks and benefits of the procedure were explained to the patient.  Patient was in agreement.  Patient was brought back to the OR and placed upon the table.  Bilateral lower extremity SCDs were placed and turned on.  Patient received IV antibiotic prophylaxis.  Patient underwent smooth induction of anesthesia and was positioned in dorsal lithotomy. The genitalia were prepped and draped in sterile fashion.  A standard pre-procedural timeout was performed.     Case began with insertion of 21 Iraqi rigid cystoscope.  Pan cystourethroscopy was performed.  See the above findings for details.     fluoroscopic images were obtained. The stone was visible in the expected location in the mid ureter.    Attention was turned toward the left  ureteral orifice.      A 5Fr catheter was advanced into the ureteral orifice and a guidewire was then passed through the catheter until a coil was visualized in the renal pelvis.     A semirigid ureteroscopy was advanced alongside the wire and the stone was encountered in the mid ureter. Given the size it was felt that treatment within the kidney would be safest, therefore irrigation was used to retropulse to the stone into the kidney. A second wire was placed through the scope into the kidney and the scope was removed.       It was decided that an access sheath would be utilized. 11-13 Fr 45 cm access sheath was advanced over one of the wires under fluoroscopic guidance toward the proximal ureter.  Wire and inner sheath were removed. Flexible ureteroscope was assembled and  advanced into the renal pelvis through the sheath.  Pyeloscopy was performed.     The stone was encountered in the lower pole. A 272nm Holmium laser fiber was introduced through the scope and the stone was fragmented until small enough to be basket extracted. A zero-tip stone basket was then used to extract any fragments larger than 1mm in size. Retrograde pyelogram was performed with findings above and a careful reductive pyeloscopy confirmed no additional significant stone fragments in the kidney.     Pullout flexible ureteroscopy was then performed with no residual fragments noted in the ureter and no ureteral injuries.    The ureteroscope was then removed from the bladder and urethra.     A 6 x 26 double-J ureteral stent was then deployed in the standard fashion.  Good proximal curl was seen on fluoroscopy.  Good distal curl was seen on direct cystoscopy.      The bladder was emptied with the cystoscope.    The tether was left on the stent and secured to the top of the penis with a Tegaderm      Patient was uneventfully extubated and brought to PACU in stable condition.     IBrian MD, performed the entire procedure as the primary attending surgeon.    Brian Barrientos MD   [facility]   [facility]

## 2024-11-29 NOTE — ANESTHESIA POSTPROCEDURE EVALUATION
Post-Op Assessment Note    CV Status:  Stable  Pain Score: 0    Pain management: adequate       Mental Status:  Awake and alert   Hydration Status:  Stable   PONV Controlled:  None   Airway Patency:  Patent     Post Op Vitals Reviewed: Yes    No anethesia notable event occurred.    Staff: CRNA           Last Filed PACU Vitals:  Vitals Value Taken Time   Temp 98.8    Pulse 67    /85    Resp 18    SpO2 95        Modified Rafa:  No data recorded

## 2024-11-29 NOTE — ANESTHESIA PREPROCEDURE EVALUATION
Procedure:  CYSTOSCOPY URETEROSCOPY WITH LITHOTRIPSY HOLMIUM LASER, RETROGRADE PYELOGRAM AND INSERTION STENT URETERAL (Left: Bladder)    Relevant Problems   ANESTHESIA (within normal limits)      GI/HEPATIC   (+) Fatty liver      /RENAL   (+) Benign prostatic hyperplasia without lower urinary tract symptoms      PULMONARY   (+) BENI (obstructive sleep apnea)        Physical Exam    Airway    Mallampati score: III  TM Distance: >3 FB  Neck ROM: full     Dental   No notable dental hx     Cardiovascular      Pulmonary      Other Findings        Anesthesia Plan  ASA Score- 2     Anesthesia Type- general with ASA Monitors.         Additional Monitors:     Airway Plan: LMA.           Plan Factors-Exercise tolerance (METS): >4 METS.    Chart reviewed. EKG reviewed.  Existing labs reviewed. Patient summary reviewed.    Patient is not a current smoker.              Induction- intravenous.    Postoperative Plan- Plan for postoperative opioid use.         Informed Consent- Anesthetic plan and risks discussed with patient.  I personally reviewed this patient with the CRNA. Discussed and agreed on the Anesthesia Plan with the CRNA..

## 2024-11-29 NOTE — ANESTHESIA POSTPROCEDURE EVALUATION
Post-Op Assessment Note    CV Status:  Stable    Pain management: adequate       Mental Status:  Alert and awake   Hydration Status:  Euvolemic   PONV Controlled:  Controlled   Airway Patency:  Patent     Post Op Vitals Reviewed: Yes    No anethesia notable event occurred.    Staff: Anesthesiologist           Last Filed PACU Vitals:  Vitals Value Taken Time   Temp 98.1 °F (36.7 °C) 11/29/24 1100   Pulse 63 11/29/24 1104   /83 11/29/24 1100   Resp 16 11/29/24 1045   SpO2 95 % 11/29/24 1104   Vitals shown include unfiled device data.    Modified Rafa:  Activity: 2 (11/29/2024 11:00 AM)  Respiration: 2 (11/29/2024 11:00 AM)  Circulation: 2 (11/29/2024 11:00 AM)  Consciousness: 2 (11/29/2024 11:00 AM)  Oxygen Saturation: 2 (11/29/2024 11:00 AM)  Modified Rafa Score: 10 (11/29/2024 11:00 AM)         yes

## 2024-11-29 NOTE — DISCHARGE INSTR - AVS FIRST PAGE
Dear Mr. Fenton,    Your ureteroscopy surgery went well. I was able to treat your stone and remove all significant fragments. You may pass some small stone fragments and dust over the next few days. This is normal.    Some bloody urine is quite normal for up to 2 weeks after surgery.  If the urine his bloody but clear at this is not concerning.  However if it is bloody and thick like ketchup this is concerning and you should let us know.    Feelings of having to urinate more often with urgency and having bladder spasms is very common after this kind surgery as it is your bladder's way of reacting to the surgery and your stent.  You may continue tylenol and motrin as needed for this discomfort.     If you are having difficulty voiding please let us know because sometimes there can be difficulty voiding from surgery.    You may remove your stent at home on Monday.    Please call us if you have any questions or concerns, [phone].    Brian Daley MD  [facility]   [facility]    INSTRUCTIONS FOR REMOVING A STENT WITH STRING    Sit on the toilet or in the bathtub  Remove the clear surgical tape that is holding the string to your body. This should peel off easily.   Grasp the string with your fingers where it is leaving your body and gently but consistently pull the string away from your body until the entire stent was been removed. The stent should come out easily with only very mild resistance. If there is significant resistance please stop pulling the string and call our office.   Once the stent has been completely be removed, please check to make sure that it looks similar to the picture below with a curl at each end.              [facility]: [phone]

## 2024-12-01 LAB — BACTERIA UR CULT: NORMAL

## 2024-12-02 ENCOUNTER — TELEPHONE (OUTPATIENT)
Dept: UROLOGY | Facility: AMBULATORY SURGERY CENTER | Age: 74
End: 2024-12-02

## 2024-12-02 NOTE — TELEPHONE ENCOUNTER
Post Op Note    Cody Fenton is a 74 y.o. male s/p Cysto stent performed 11/29/2024.  Cody Fenton is a patient of  and is seen at the [place] office.     How would you rate your pain on a scale from 1 to 10, 10 being the worst pain ever? 0  Have you had a fever? No  Have your bowel movements been regular? Yes  Do you have any difficulty urinating? Yes some burning    Do you have any other questions or concerns that I can address at this time? Patient will remove stent tomorrow first thing as she said last time he was unable to urinate - that way if he doesn't urinate he can come to the office  for a catheter.

## 2024-12-02 NOTE — TELEPHONE ENCOUNTER
----- Message from Brian Barrientos MD sent at 11/29/2024 10:50 AM EST -----  This patient is post-op after left ureteroscopy with me today. He has a stent on a string and will remove it himself on Monday.     He needs AP follow up in 2 months with a renal ultrasound. That is ordered. He does not need any near term follow up with me. Patient tentatively scheduled for  Wednesday 2/4 @ 240

## 2024-12-04 LAB
COLOR STONE: NORMAL
COM MFR STONE: 10 %
COMMENT-STONE3: NORMAL
COMPOSITION: NORMAL
LABORATORY COMMENT REPORT: NORMAL
PHOTO: NORMAL
SIZE STONE: NORMAL MM
SPEC SOURCE SUBJ: NORMAL
STONE ANALYSIS-IMP: NORMAL
STONE ANALYSIS-IMP: NORMAL
URATE MFR STONE: 90 %
WT STONE: 229 MG

## 2024-12-05 ENCOUNTER — APPOINTMENT (OUTPATIENT)
Dept: LAB | Facility: CLINIC | Age: 74
End: 2024-12-05
Payer: COMMERCIAL

## 2024-12-05 ENCOUNTER — RESULTS FOLLOW-UP (OUTPATIENT)
Dept: FAMILY MEDICINE CLINIC | Facility: CLINIC | Age: 74
End: 2024-12-05

## 2024-12-05 DIAGNOSIS — Z13.220 ENCOUNTER FOR LIPID SCREENING FOR CARDIOVASCULAR DISEASE: ICD-10-CM

## 2024-12-05 DIAGNOSIS — Z13.6 ENCOUNTER FOR LIPID SCREENING FOR CARDIOVASCULAR DISEASE: ICD-10-CM

## 2024-12-05 DIAGNOSIS — K76.0 FATTY LIVER: ICD-10-CM

## 2024-12-05 LAB
25(OH)D3 SERPL-MCNC: 42.4 NG/ML (ref 30–100)
ALBUMIN SERPL BCG-MCNC: 4.3 G/DL (ref 3.5–5)
ALP SERPL-CCNC: 88 U/L (ref 34–104)
ALT SERPL W P-5'-P-CCNC: 19 U/L (ref 7–52)
ANION GAP SERPL CALCULATED.3IONS-SCNC: 6 MMOL/L (ref 4–13)
AST SERPL W P-5'-P-CCNC: 15 U/L (ref 13–39)
BASOPHILS # BLD AUTO: 0.05 THOUSANDS/ΜL (ref 0–0.1)
BASOPHILS NFR BLD AUTO: 1 % (ref 0–1)
BILIRUB SERPL-MCNC: 0.57 MG/DL (ref 0.2–1)
BUN SERPL-MCNC: 20 MG/DL (ref 5–25)
CALCIUM SERPL-MCNC: 9.6 MG/DL (ref 8.4–10.2)
CHLORIDE SERPL-SCNC: 106 MMOL/L (ref 96–108)
CHOLEST SERPL-MCNC: 198 MG/DL (ref ?–200)
CO2 SERPL-SCNC: 26 MMOL/L (ref 21–32)
CREAT SERPL-MCNC: 1.19 MG/DL (ref 0.6–1.3)
EOSINOPHIL # BLD AUTO: 0.14 THOUSAND/ΜL (ref 0–0.61)
EOSINOPHIL NFR BLD AUTO: 2 % (ref 0–6)
ERYTHROCYTE [DISTWIDTH] IN BLOOD BY AUTOMATED COUNT: 13.5 % (ref 11.6–15.1)
GFR SERPL CREATININE-BSD FRML MDRD: 60 ML/MIN/1.73SQ M
GLUCOSE P FAST SERPL-MCNC: 101 MG/DL (ref 65–99)
HCT VFR BLD AUTO: 44 % (ref 36.5–49.3)
HDLC SERPL-MCNC: 46 MG/DL
HGB BLD-MCNC: 13.9 G/DL (ref 12–17)
IMM GRANULOCYTES # BLD AUTO: 0.04 THOUSAND/UL (ref 0–0.2)
IMM GRANULOCYTES NFR BLD AUTO: 1 % (ref 0–2)
LDLC SERPL CALC-MCNC: 120 MG/DL (ref 0–100)
LYMPHOCYTES # BLD AUTO: 2.26 THOUSANDS/ΜL (ref 0.6–4.47)
LYMPHOCYTES NFR BLD AUTO: 26 % (ref 14–44)
MCH RBC QN AUTO: 28.4 PG (ref 26.8–34.3)
MCHC RBC AUTO-ENTMCNC: 31.6 G/DL (ref 31.4–37.4)
MCV RBC AUTO: 90 FL (ref 82–98)
MONOCYTES # BLD AUTO: 0.88 THOUSAND/ΜL (ref 0.17–1.22)
MONOCYTES NFR BLD AUTO: 10 % (ref 4–12)
NEUTROPHILS # BLD AUTO: 5.23 THOUSANDS/ΜL (ref 1.85–7.62)
NEUTS SEG NFR BLD AUTO: 60 % (ref 43–75)
NRBC BLD AUTO-RTO: 0 /100 WBCS
PLATELET # BLD AUTO: 394 THOUSANDS/UL (ref 149–390)
PMV BLD AUTO: 9.8 FL (ref 8.9–12.7)
POTASSIUM SERPL-SCNC: 4.2 MMOL/L (ref 3.5–5.3)
PROT SERPL-MCNC: 7.7 G/DL (ref 6.4–8.4)
PSA SERPL-MCNC: 3.04 NG/ML (ref 0–4)
RBC # BLD AUTO: 4.89 MILLION/UL (ref 3.88–5.62)
SODIUM SERPL-SCNC: 138 MMOL/L (ref 135–147)
TRIGL SERPL-MCNC: 162 MG/DL (ref ?–150)
WBC # BLD AUTO: 8.6 THOUSAND/UL (ref 4.31–10.16)

## 2024-12-05 PROCEDURE — 80053 COMPREHEN METABOLIC PANEL: CPT

## 2024-12-05 PROCEDURE — 80061 LIPID PANEL: CPT

## 2024-12-05 NOTE — TELEPHONE ENCOUNTER
----- Message from Obed Jerry DO sent at 12/5/2024  3:16 PM EST -----  No major concerns regarding lab results.  Patient with notable ongoing elevations in triglycerides and LDL.  Given elevated ASCVD risk score patient should consider starting statin therapy for further reduction.  Otherwise patient should aim for reducing intake of saturated fats, red and processed meats, fried foods, processed foods and aim for high-fiber foods found in fruits and vegetables.

## 2024-12-11 ENCOUNTER — ANESTHESIA (OUTPATIENT)
Dept: ANESTHESIOLOGY | Facility: HOSPITAL | Age: 74
End: 2024-12-11

## 2024-12-11 ENCOUNTER — ANESTHESIA EVENT (OUTPATIENT)
Dept: ANESTHESIOLOGY | Facility: HOSPITAL | Age: 74
End: 2024-12-11

## 2024-12-11 ENCOUNTER — OFFICE VISIT (OUTPATIENT)
Dept: FAMILY MEDICINE CLINIC | Facility: CLINIC | Age: 74
End: 2024-12-11

## 2024-12-11 VITALS
TEMPERATURE: 97.3 F | OXYGEN SATURATION: 98 % | WEIGHT: 186.4 LBS | DIASTOLIC BLOOD PRESSURE: 86 MMHG | HEART RATE: 80 BPM | HEIGHT: 73 IN | SYSTOLIC BLOOD PRESSURE: 116 MMHG | BODY MASS INDEX: 24.7 KG/M2

## 2024-12-11 DIAGNOSIS — Z91.89 FRAMINGHAM CARDIAC RISK 10-20% IN NEXT 10 YEARS: Primary | ICD-10-CM

## 2024-12-11 DIAGNOSIS — N20.1 LEFT URETERAL CALCULUS: ICD-10-CM

## 2024-12-11 DIAGNOSIS — H61.21 RIGHT EAR IMPACTED CERUMEN: ICD-10-CM

## 2024-12-11 NOTE — PROGRESS NOTES
Name: Cody Fenton      : 1950      MRN: [id number]  Encounter Provider: Jasson Centeno MD  Encounter Date: 2024   Encounter department: [facility]    Assessment & Plan  [place] cardiac risk 10-20% in next 10 years  I reviewed with patient.  Calculated ASCVD risk today is 18.1%.  I discussed these results and his implications.  LDL was 120 on recent labs.  Starting on a statin to reduce his ASCVD risk-patient is extremely reluctant.  We discussed his concerns.  Patient refuses treatment for now-will discuss that at next visit       Left ureteral calculus  Patient feeling much better status post stone removal.  Small renal cyst but no other stones were noted on CT scan.  Prostate was enlarged with evidence of previous UroLift procedure.  Follow-up with urology.  Recheck 6 months       Right ear impacted cerumen  Patient to status post cerumen removal.  Discussed prophylaxis.  Check as needed.  Orders:  •  Ear cerumen removal       I have spent a total time of 30 minutes in caring for this patient on the day of the visit/encounter including Diagnostic results, Prognosis, Risks and benefits of tx options, Instructions for management, Importance of tx compliance, Risk factor reductions, Impressions, Documenting in the medical record, Reviewing / ordering tests, medicine, procedures  , and Obtaining or reviewing history  .   History of Present Illness     Pt here to be established  - pt with facial infection this past summer after a dental infection. Pt was in ICU for several days and had abscess drained by oral surgery. Has done well since without recurrence  - pt with recent 11mm stone, treated by Urology.  Pt without any signs of renal colic since  - pt with hx of recurrent R cerumen impaction.    - pt denies any other problems or concerns.   - I reviewed PMHx, PSHx, Fam Hx and Soc Hx with pt.   - I reviewed recent labs with pt  - full ROS done        Review of  Systems   Constitutional: Negative.    HENT: Negative.     Eyes: Negative.    Respiratory: Negative.     Cardiovascular: Negative.    Gastrointestinal: Negative.    Endocrine: Negative.    Genitourinary: Negative.    Musculoskeletal:  Positive for arthralgias (scattered, mild, intermittent). Negative for back pain and myalgias.   Skin: Negative.    Allergic/Immunologic: Negative.    Neurological: Negative.    Hematological: Negative.    Psychiatric/Behavioral: Negative.       Past Medical History:   Diagnosis Date   • BPH (benign prostatic hyperplasia)    • Kidney stone     isaias   • Migraines      Past Surgical History:   Procedure Laterality Date   • COLONOSCOPY     • FL RETROGRADE PYELOGRAM  11/19/2020   • FL RETROGRADE PYELOGRAM  11/29/2024   • INCISION AND DRAINAGE INTRA ORAL ABSCESS Right 9/23/2024    Procedure: INCISION AND DRAINAGE  (I&D) WOUND ORAL,tooth extraction #32;  Surgeon: Vernon Saenz DMD;  [facility];  Service: Maxillofacial   • LITHOTRIPSY     • LYMPH NODE BIOPSY  1999    cervical, path showerd toxoplasmosis, no treatment   • DC CYSTO INSERTION TRANSPROSTATIC IMPLANT SINGLE N/A 7/9/2021    Procedure: CYSTOSCOPY WITH INSERTION UROLIFT;  Surgeon: Godfrey Curtis MD;  [facility];  Service: Urology   • DC CYSTO/URETERO W/LITHOTRIPSY &INDWELL STENT INSRT Left 11/19/2020    Procedure: CYSTOSCOPY URETEROSCOPY WITH LITHOTRIPSY HOLMIUM LASER, RIGHT AND LEFT RETROGRADE PYELOGRAM  AND INSERTION LEFT  STENT URETERAL;  Surgeon: Aj Guerra MD;  [facility];  Service: Urology   • DC CYSTO/URETERO W/LITHOTRIPSY &INDWELL STENT INSRT Left 11/29/2024    Procedure: CYSTOSCOPY URETEROSCOPY WITH LITHOTRIPSY HOLMIUM LASER, RETROGRADE PYELOGRAM AND INSERTION STENT URETERAL;  Surgeon: Brian Barrientos MD;  [facility];  Service: Urology   • TONSILLECTOMY       Family History   Problem Relation Age of Onset   • Colon cancer Mother         dx late 60's   • Migraines  "Mother    • Cancer Mother 60        Liver or Colon cancer?   • Lung cancer Father    • Alcohol abuse Neg Hx    • Substance Abuse Neg Hx    • Mental illness Neg Hx    • Depression Neg Hx      Social History     Tobacco Use   • Smoking status: Never   • Smokeless tobacco: Never   Vaping Use   • Vaping status: Never Used   Substance and Sexual Activity   • Alcohol use: Yes     Alcohol/week: 2.0 standard drinks of alcohol     Types: 2 Cans of beer per week     Comment: 2 x per week   • Drug use: No   • Sexual activity: Yes     Partners: Female     Birth control/protection: None     Current Outpatient Medications on File Prior to Visit   Medication Sig   • Ascorbic Acid (VITAMIN C PO) Take by mouth daily    • carbamide peroxide (DEBROX) 6.5 % otic solution Administer 5 drops to the right ear 2 (two) times a day   • Cholecalciferol (VITAMIN D3) 2000 units capsule Take by mouth daily    • Coenzyme Q10 (CO Q 10) 100 MG CAPS Take by mouth daily    • cyanocobalamin (VITAMIN B-12) 1,000 mcg tablet Take 3 tablets by mouth daily   • LUTEIN PO Take 2 tablets by mouth daily   • Multiple Vitamin (MULTIVITAMIN) tablet Take 1 tablet by mouth daily   • Omega-3 Fatty Acids (FISH OIL) 1200 MG CPDR Take by mouth daily      No Known Allergies  Immunization History   Administered Date(s) Administered   • COVID-19 PFIZER VACCINE 0.3 ML IM 03/17/2021, 04/09/2021     Objective   /86   Pulse 80   Temp (!) 97.3 °F (36.3 °C)   Ht 6' 0.72\" (1.847 m)   Wt 84.6 kg (186 lb 6.4 oz)   SpO2 98%   BMI 24.78 kg/m²     Physical Exam  Vitals reviewed.   HENT:      Head: Normocephalic.      Right Ear: External ear normal. There is impacted cerumen.      Left Ear: Tympanic membrane, ear canal and external ear normal.      Nose: Nose normal.      Mouth/Throat:      Mouth: Mucous membranes are moist.   Eyes:      General: No scleral icterus.     Extraocular Movements: Extraocular movements intact.      Conjunctiva/sclera: Conjunctivae normal.      " Pupils: Pupils are equal, round, and reactive to light.   Neck:      Vascular: No carotid bruit.   Cardiovascular:      Rate and Rhythm: Normal rate and regular rhythm.      Pulses: Normal pulses.   Pulmonary:      Effort: Pulmonary effort is normal.   Abdominal:      General: There is no distension.      Palpations: There is no mass.      Tenderness: There is no abdominal tenderness.   Musculoskeletal:         General: No swelling, tenderness or deformity.      Cervical back: No tenderness.      Right lower leg: No edema.      Left lower leg: No edema.   Lymphadenopathy:      Cervical: No cervical adenopathy.   Skin:     General: Skin is warm.      Capillary Refill: Capillary refill takes less than 2 seconds.   Neurological:      General: No focal deficit present.      Mental Status: He is alert.      Cranial Nerves: No cranial nerve deficit.      Sensory: No sensory deficit.      Motor: No weakness.      Gait: Gait normal.   Psychiatric:         Mood and Affect: Mood normal.         Ear cerumen removal    Date/Time: 12/11/2024 10:00 AM    Performed by: Jasson Centeno MD  Authorized by: Jasson Centeno MD  Universal Protocol:  procedure performed by consultantConsent: Verbal consent obtained.  Risks and benefits: risks, benefits and alternatives were discussed  Consent given by: patient  Patient understanding: patient states understanding of the procedure being performed  Patient identity confirmed: verbally with patient    Patient location:  Clinic  Procedure details:     Local anesthetic:  None    Location:  R ear    Procedure type: irrigation with instrumentation      Instrumentation: forceps      Approach:  Natural orifice    Visualization (free text):  Otoscope - TM normal after cerumen removal    Equipment used:  Irrigation, alligator forceps  Post-procedure details:     Complication:  None    Hearing quality:  Improved    Patient tolerance of procedure:  Tolerated well, no immediate  complications

## 2024-12-11 NOTE — ASSESSMENT & PLAN NOTE
I reviewed with patient.  Calculated ASCVD risk today is 18.1%.  I discussed these results and his implications.  LDL was 120 on recent labs.  Starting on a statin to reduce his ASCVD risk-patient is extremely reluctant.  We discussed his concerns.  Patient refuses treatment for now-will discuss that at next visit

## 2024-12-11 NOTE — ASSESSMENT & PLAN NOTE
Patient feeling much better status post stone removal.  Small renal cyst but no other stones were noted on CT scan.  Prostate was enlarged with evidence of previous UroLift procedure.  Follow-up with urology.  Recheck 6 months

## 2024-12-13 PROBLEM — M27.2 MANDIBULAR ABSCESS: Status: RESOLVED | Noted: 2024-09-21 | Resolved: 2024-12-13

## 2025-01-07 ENCOUNTER — ANESTHESIA EVENT (OUTPATIENT)
Dept: ANESTHESIOLOGY | Facility: HOSPITAL | Age: 75
End: 2025-01-07

## 2025-01-07 ENCOUNTER — ANESTHESIA (OUTPATIENT)
Dept: ANESTHESIOLOGY | Facility: HOSPITAL | Age: 75
End: 2025-01-07

## 2025-01-08 ENCOUNTER — HOSPITAL ENCOUNTER (OUTPATIENT)
Dept: GASTROENTEROLOGY | Facility: AMBULARY SURGERY CENTER | Age: 75
Setting detail: OUTPATIENT SURGERY
Discharge: HOME | End: 2025-01-08
Attending: COLON & RECTAL SURGERY
Payer: COMMERCIAL

## 2025-01-08 ENCOUNTER — ANESTHESIA EVENT (OUTPATIENT)
Dept: GASTROENTEROLOGY | Facility: AMBULARY SURGERY CENTER | Age: 75
End: 2025-01-08
Payer: COMMERCIAL

## 2025-01-08 VITALS
TEMPERATURE: 96.5 F | OXYGEN SATURATION: 96 % | DIASTOLIC BLOOD PRESSURE: 77 MMHG | HEIGHT: 72 IN | HEART RATE: 61 BPM | RESPIRATION RATE: 24 BRPM | WEIGHT: 185 LBS | BODY MASS INDEX: 25.06 KG/M2 | SYSTOLIC BLOOD PRESSURE: 122 MMHG

## 2025-01-08 DIAGNOSIS — Z86.0100 HISTORY OF COLON POLYPS: ICD-10-CM

## 2025-01-08 PROCEDURE — G0105 COLORECTAL SCRN; HI RISK IND: HCPCS | Performed by: COLON & RECTAL SURGERY

## 2025-01-08 RX ORDER — SODIUM CHLORIDE, SODIUM LACTATE, POTASSIUM CHLORIDE, CALCIUM CHLORIDE 600; 310; 30; 20 MG/100ML; MG/100ML; MG/100ML; MG/100ML
INJECTION, SOLUTION INTRAVENOUS CONTINUOUS PRN
Status: DISCONTINUED | OUTPATIENT
Start: 2025-01-08 | End: 2025-01-08

## 2025-01-08 RX ORDER — PROPOFOL 10 MG/ML
INJECTION, EMULSION INTRAVENOUS AS NEEDED
Status: DISCONTINUED | OUTPATIENT
Start: 2025-01-08 | End: 2025-01-08

## 2025-01-08 RX ADMIN — PROPOFOL 100 MG: 10 INJECTION, EMULSION INTRAVENOUS at 07:38

## 2025-01-08 RX ADMIN — PROPOFOL 50 MG: 10 INJECTION, EMULSION INTRAVENOUS at 07:40

## 2025-01-08 RX ADMIN — SODIUM CHLORIDE, SODIUM LACTATE, POTASSIUM CHLORIDE, AND CALCIUM CHLORIDE: .6; .31; .03; .02 INJECTION, SOLUTION INTRAVENOUS at 07:33

## 2025-01-08 RX ADMIN — PROPOFOL 30 MG: 10 INJECTION, EMULSION INTRAVENOUS at 07:42

## 2025-01-08 RX ADMIN — PROPOFOL 20 MG: 10 INJECTION, EMULSION INTRAVENOUS at 07:51

## 2025-01-08 RX ADMIN — PROPOFOL 30 MG: 10 INJECTION, EMULSION INTRAVENOUS at 07:47

## 2025-01-08 RX ADMIN — PROPOFOL 20 MG: 10 INJECTION, EMULSION INTRAVENOUS at 07:45

## 2025-01-08 NOTE — H&P
History and Physical   Colon and Rectal Surgery   Cody Fenton 74 y.o. male MRN: [id number]  Unit/Bed#:  Encounter: [id number]  01/08/25   7:31 AM      CC:  History of colon polyps    History of Present Illness   HPI:  Cody Fenton is a 74 y.o. male with no GI symptoms.  Historical Information   Past Medical History:   Diagnosis Date    BPH (benign prostatic hyperplasia)     Kidney stone     isaias    Migraines      Past Surgical History:   Procedure Laterality Date    COLONOSCOPY      FL RETROGRADE PYELOGRAM  11/19/2020    FL RETROGRADE PYELOGRAM  11/29/2024    INCISION AND DRAINAGE INTRA ORAL ABSCESS Right 9/23/2024    Procedure: INCISION AND DRAINAGE  (I&D) WOUND ORAL,tooth extraction #32;  Surgeon: Vernon Saenz DMD;  [facility];  Service: Maxillofacial    LITHOTRIPSY      LYMPH NODE BIOPSY  1999    cervical, path showerd toxoplasmosis, no treatment    DC CYSTO INSERTION TRANSPROSTATIC IMPLANT SINGLE N/A 7/9/2021    Procedure: CYSTOSCOPY WITH INSERTION UROLIFT;  Surgeon: Godfrey Curtis MD;  [facility];  Service: Urology    DC CYSTO/URETERO W/LITHOTRIPSY &INDWELL STENT INSRT Left 11/19/2020    Procedure: CYSTOSCOPY URETEROSCOPY WITH LITHOTRIPSY HOLMIUM LASER, RIGHT AND LEFT RETROGRADE PYELOGRAM  AND INSERTION LEFT  STENT URETERAL;  Surgeon: Aj Guerra MD;  [facility];  Service: Urology    DC CYSTO/URETERO W/LITHOTRIPSY &INDWELL STENT INSRT Left 11/29/2024    Procedure: CYSTOSCOPY URETEROSCOPY WITH LITHOTRIPSY HOLMIUM LASER, RETROGRADE PYELOGRAM AND INSERTION STENT URETERAL;  Surgeon: Brian Barrientos MD;  [facility];  Service: Urology    TONSILLECTOMY         Meds/Allergies     Not in a hospital admission.      Current Outpatient Medications:     Ascorbic Acid (VITAMIN C PO), Take by mouth daily , Disp: , Rfl:     Cholecalciferol (VITAMIN D3) 2000 units capsule, Take by mouth daily , Disp: , Rfl:     Coenzyme Q10 (CO Q 10) 100 MG CAPS, Take by mouth daily ,  Disp: , Rfl:     cyanocobalamin (VITAMIN B-12) 1,000 mcg tablet, Take 3 tablets by mouth daily, Disp: , Rfl:     LUTEIN PO, Take 2 tablets by mouth daily, Disp: , Rfl:     Multiple Vitamin (MULTIVITAMIN) tablet, Take 1 tablet by mouth daily, Disp: , Rfl:     Omega-3 Fatty Acids (FISH OIL) 1200 MG CPDR, Take by mouth daily , Disp: , Rfl:     No Known Allergies      Social History   Social History     Substance and Sexual Activity   Alcohol Use Yes    Alcohol/week: 2.0 standard drinks of alcohol    Types: 2 Cans of beer per week    Comment: 2 x per week     Social History     Substance and Sexual Activity   Drug Use No     Social History     Tobacco Use   Smoking Status Never   Smokeless Tobacco Never         Family History:   Family History   Problem Relation Age of Onset    Colon cancer Mother         dx late 60's    Migraines Mother     Cancer Mother 60        Liver or Colon cancer?    Lung cancer Father     Alcohol abuse Neg Hx     Substance Abuse Neg Hx     Mental illness Neg Hx     Depression Neg Hx          Objective     Current Vitals:   Blood Pressure: 136/94 (01/08/25 0702)  Pulse: 57 (01/08/25 0702)  Temperature: (!) 96.2 °F (35.7 °C) (01/08/25 0702)  Temp Source: Temporal (01/08/25 0702)  Respirations: 18 (01/08/25 0702)  Height: 6' (182.9 cm) (01/08/25 0702)  Weight - Scale: 83.9 kg (185 lb) (01/08/25 0702)  SpO2: 99 % (01/08/25 0702)  No intake or output data in the 24 hours ending 01/08/25 0731    Physical Exam:  General:  Well nourished, no distress.  Neuro: Alert and oriented  Eyes:Sclera anicteric, conjunctiva pink.  Pulm: Clear to auscultation bilaterally. No respiratory Distress.   CV:  Regular rate and rhythm. No murmurs.  Abdomen:  Soft, flat, non-tender, without masses or hepatosplenomegaly.    Lab Results:       ASSESSMENT:  Cody Fenton is a 74 y.o. male for surveillance.  PLAN:  Colonoscopy.  Risks , including, but not limited to, bleeding, perforation, missed lesions, and potential need  for surgery, were reviewed. Alternatives to colonoscopy were discussed.  INA Ingram MD

## 2025-01-08 NOTE — ANESTHESIA PREPROCEDURE EVALUATION
Procedure:  COLONOSCOPY    Relevant Problems   ANESTHESIA (within normal limits)      GI/HEPATIC   (+) Fatty liver      /RENAL   (+) Benign prostatic hyperplasia without lower urinary tract symptoms      PULMONARY   (+) BENI (obstructive sleep apnea)        Physical Exam    Airway    Mallampati score: II  TM Distance: >3 FB  Neck ROM: full     Dental   No notable dental hx     Cardiovascular      Pulmonary      Other Findings        Anesthesia Plan  ASA Score- 2     Anesthesia Type- IV sedation with anesthesia with ASA Monitors.         Additional Monitors:     Airway Plan:            Plan Factors-Exercise tolerance (METS): >4 METS.    Chart reviewed.    Patient summary reviewed.                  Induction- intravenous.    Postoperative Plan-         Informed Consent- Anesthetic plan and risks discussed with patient.  I personally reviewed this patient with the CRNA. Discussed and agreed on the Anesthesia Plan with the CRNA..

## 2025-01-23 ENCOUNTER — HOSPITAL ENCOUNTER (OUTPATIENT)
Dept: ULTRASOUND IMAGING | Facility: HOSPITAL | Age: 75
Discharge: HOME | End: 2025-01-23
Payer: COMMERCIAL

## 2025-01-23 DIAGNOSIS — N20.1 LEFT URETERAL CALCULUS: ICD-10-CM

## 2025-01-23 PROCEDURE — 76775 US EXAM ABDO BACK WALL LIM: CPT

## 2025-01-27 ENCOUNTER — RESULTS FOLLOW-UP (OUTPATIENT)
Dept: OTHER | Facility: HOSPITAL | Age: 75
End: 2025-01-27

## 2025-01-27 NOTE — RESULT ENCOUNTER NOTE
Ultrasound reviewed with no residual hydronephrosis. Bilateral nephrolithiasis called on this ultrasound likely artifactual as there was no additional significant stone burden in either kidney on prior CT scan.     Can consider repeat ultrasound in 6 months to assess for interval change. Alternatively can obtain CT scan at next surveillance imaging for an accurate assessment in case patient is rapidly forming new stones.

## 2025-02-04 ENCOUNTER — OFFICE VISIT (OUTPATIENT)
Dept: UROLOGY | Facility: AMBULATORY SURGERY CENTER | Age: 75
End: 2025-02-04
Payer: COMMERCIAL

## 2025-02-04 VITALS
DIASTOLIC BLOOD PRESSURE: 82 MMHG | OXYGEN SATURATION: 97 % | HEIGHT: 72 IN | WEIGHT: 194.8 LBS | BODY MASS INDEX: 26.38 KG/M2 | HEART RATE: 72 BPM | SYSTOLIC BLOOD PRESSURE: 118 MMHG

## 2025-02-04 DIAGNOSIS — N20.1 LEFT URETERAL CALCULUS: ICD-10-CM

## 2025-02-04 DIAGNOSIS — N20.0 NEPHROLITHIASIS: Primary | ICD-10-CM

## 2025-02-04 PROCEDURE — 99213 OFFICE O/P EST LOW 20 MIN: CPT

## 2025-02-04 NOTE — PROGRESS NOTES
Name: Cody Fenton      : 1950      MRN: [id number]  Encounter Provider: LENA Rodriguez  Encounter Date: 2025   Encounter department: [facility]OLOG[facility] [place]  :  Assessment & Plan  Nephrolithiasis  Patient recently underwent left ureteroscopy with Dr. Barrientos on 2024.    Stone analysis did reveal a uric acid stone, previous stone analysis demonstrated calcium oxalate.  I did offer the patient medication to help with future stone formation due to his uric acid stone.  He would either benefit from potassium citrate or allopurinol.  He states that he is not interested in starting any new medications.    Kidney and bladder ultrasound on 2025 demonstrating no hydronephrosis, multiple bilateral nonobstructing renal calculi measuring up to 5 mm.  We will plan to obtain a repeat CT stone study in 6 months to further assess the stone burden and any increase in size.  If CT stone study remains stable we can discuss moving forward with surveillance with ultrasound imaging.    I did offer referral to nephrology for further medical workup but patient wishes to defer any referral at this time.    We did discuss the importance of adequate water intake of at least 2 to 3 L of water daily, adding citrus to drinks and following a low calcium oxalate diet.    Instructed him to call our office with any concern for kidney stone passage and we can treat accordingly.    Orders:    CT renal stone study abdomen pelvis wo contrast; Future    Left ureteral calculus  See plan above.            History of Present Illness   Cody Fenton is a 74 y.o. male who presents today to the office for follow-up of nephrolithiasis and ureterolithiasis.  He recently underwent ureteroscopy with Dr. Barrientos on 2024.  He states that he has had ongoing issues with kidney stones in the past and has required multiple surgeries.  He states that since his surgery he is overall doing fine and he has had no  urinary/urological complaints.    He states that he is not interested in any additional workup or referrals.  He states he does try to stay well-hydrated with water.    AUA SYMPTOM SCORE      Flowsheet Row Most Recent Value   AUA SYMPTOM SCORE    How often have you had a sensation of not emptying your bladder completely after you finished urinating? 1 (P)     How often have you had to urinate again less than two hours after you finished urinating? 5 (P)     How often have you found you stopped and started again several times when you urinate? 5 (P)     How often have you found it difficult to postpone urination? 2 (P)     How often have you had a weak urinary stream? 4 (P)     How often have you had to push or strain to begin urination? 4 (P)     How many times did you most typically get up to urinate from the time you went to bed at night until the time you got up in the morning? 4 (P)     Quality of Life: If you were to spend the rest of your life with your urinary condition just the way it is now, how would you feel about that? 3 (P)     AUA SYMPTOM SCORE 25 (P)            Review of Systems   Constitutional:  Negative for chills and fever.   Respiratory: Negative.  Negative for cough and shortness of breath.    Cardiovascular: Negative.  Negative for chest pain.   Gastrointestinal: Negative.  Negative for abdominal distention, abdominal pain, nausea and vomiting.   Genitourinary:  Negative for decreased urine volume, difficulty urinating, dysuria, flank pain, frequency, hematuria, penile discharge, penile pain, penile swelling, scrotal swelling, testicular pain and urgency.   Skin: Negative.  Negative for rash.   Neurological: Negative.    Hematological:  Negative for adenopathy. Does not bruise/bleed easily.          Objective   /82 (Patient Position: Sitting, Cuff Size: Adult)   Pulse 72   Ht 6' (1.829 m)   Wt 88.4 kg (194 lb 12.8 oz)   SpO2 97%   BMI 26.42 kg/m²     Physical Exam  Vitals reviewed.    Constitutional:       Appearance: Normal appearance.   HENT:      Head: Normocephalic and atraumatic.   Eyes:      Pupils: Pupils are equal, round, and reactive to light.   Cardiovascular:      Rate and Rhythm: Normal rate.   Pulmonary:      Effort: Pulmonary effort is normal.   Musculoskeletal:      Cervical back: Normal range of motion.   Skin:     General: Skin is warm and dry.   Neurological:      General: No focal deficit present.      Mental Status: He is alert and oriented to person, place, and time.   Psychiatric:         Mood and Affect: Mood normal.         Behavior: Behavior normal.         Thought Content: Thought content normal.         Judgment: Judgment normal.          Results  Lab Results   Component Value Date    PSA 3.041 12/05/2024    PSA 1.26 10/05/2023    PSA 1.2 07/09/2020     Lab Results   Component Value Date    GLUCOSE 98 08/19/2015    CALCIUM 9.6 12/05/2024     08/19/2015    K 4.2 12/05/2024    CO2 26 12/05/2024     12/05/2024    BUN 20 12/05/2024    CREATININE 1.19 12/05/2024     Lab Results   Component Value Date    WBC 8.60 12/05/2024    HGB 13.9 12/05/2024    HCT 44.0 12/05/2024    MCV 90 12/05/2024     (H) 12/05/2024       Office Urine Dip  No results found for this or any previous visit (from the past hour).]

## 2025-02-04 NOTE — ASSESSMENT & PLAN NOTE
Patient recently underwent left ureteroscopy with Dr. Barrientos on 11/29/2024.    Stone analysis did reveal a uric acid stone, previous stone analysis demonstrated calcium oxalate.  I did offer the patient medication to help with future stone formation due to his uric acid stone.  He would either benefit from potassium citrate or allopurinol.  He states that he is not interested in starting any new medications.    Kidney and bladder ultrasound on 1/23/2025 demonstrating no hydronephrosis, multiple bilateral nonobstructing renal calculi measuring up to 5 mm.  We will plan to obtain a repeat CT stone study in 6 months to further assess the stone burden and any increase in size.  If CT stone study remains stable we can discuss moving forward with surveillance with ultrasound imaging.    I did offer referral to nephrology for further medical workup but patient wishes to defer any referral at this time.    We did discuss the importance of adequate water intake of at least 2 to 3 L of water daily, adding citrus to drinks and following a low calcium oxalate diet.    Instructed him to call our office with any concern for kidney stone passage and we can treat accordingly.    Orders:    CT renal stone study abdomen pelvis wo contrast; Future

## 2025-08-04 ENCOUNTER — HOSPITAL ENCOUNTER (OUTPATIENT)
Dept: CT IMAGING | Facility: HOSPITAL | Age: 75
Discharge: HOME | End: 2025-08-04
Payer: COMMERCIAL

## 2025-08-04 DIAGNOSIS — N20.0 NEPHROLITHIASIS: ICD-10-CM

## 2025-08-04 PROCEDURE — 74176 CT ABD & PELVIS W/O CONTRAST: CPT

## 2025-08-06 ENCOUNTER — OFFICE VISIT (OUTPATIENT)
Dept: FAMILY MEDICINE CLINIC | Facility: CLINIC | Age: 75
End: 2025-08-06
Payer: COMMERCIAL

## 2025-08-12 ENCOUNTER — RESULTS FOLLOW-UP (OUTPATIENT)
Dept: UROLOGY | Facility: AMBULATORY SURGERY CENTER | Age: 75
End: 2025-08-12

## 2025-08-19 ENCOUNTER — TELEPHONE (OUTPATIENT)
Dept: UROLOGY | Facility: AMBULATORY SURGERY CENTER | Age: 75
End: 2025-08-19

## 2025-08-22 ENCOUNTER — OFFICE VISIT (OUTPATIENT)
Dept: UROLOGY | Facility: AMBULATORY SURGERY CENTER | Age: 75
End: 2025-08-22
Payer: COMMERCIAL

## 2025-08-22 VITALS
BODY MASS INDEX: 25.73 KG/M2 | DIASTOLIC BLOOD PRESSURE: 64 MMHG | WEIGHT: 190 LBS | SYSTOLIC BLOOD PRESSURE: 134 MMHG | HEIGHT: 72 IN | OXYGEN SATURATION: 97 % | HEART RATE: 85 BPM

## 2025-08-22 DIAGNOSIS — Z12.5 SCREENING FOR PROSTATE CANCER: ICD-10-CM

## 2025-08-22 DIAGNOSIS — N20.0 NEPHROLITHIASIS: Primary | ICD-10-CM

## 2025-08-22 PROCEDURE — 99213 OFFICE O/P EST LOW 20 MIN: CPT

## (undated) DEVICE — SYRINGE 10ML LL

## (undated) DEVICE — BAG URINE DRAINAGE 2000ML ANTI RFLX LF

## (undated) DEVICE — GLOVE SRG BIOGEL ORTHOPEDIC 7.5

## (undated) DEVICE — IV CATH 18 G X 1.16 IN

## (undated) DEVICE — GLOVE SRG BIOGEL 7

## (undated) DEVICE — INVIEW CLEAR LEGGINGS: Brand: CONVERTORS

## (undated) DEVICE — SHEATH URETERAL ACCESS 12/14FR 45CM PROXIS

## (undated) DEVICE — SHEATH URETERAL ACCESS 12/14FR 35CM PROXIS

## (undated) DEVICE — PENCIL ELECTROSURG E-Z CLEAN -0035H

## (undated) DEVICE — SYRINGE 20ML LL

## (undated) DEVICE — INTENDED FOR TISSUE SEPARATION, AND OTHER PROCEDURES THAT REQUIRE A SHARP SURGICAL BLADE TO PUNCTURE OR CUT.: Brand: BARD-PARKER ® CARBON RIB-BACK BLADES

## (undated) DEVICE — CATH FOLEY 12FR 5ML 2 WAY SILICONE ELASTIMER

## (undated) DEVICE — PACK TUR

## (undated) DEVICE — GUIDEWIRE STRGHT TIP 0.035 IN  SOLO PLUS

## (undated) DEVICE — FIBER STD QUANTA 272 MICRON

## (undated) DEVICE — DECANTER: Brand: UNBRANDED

## (undated) DEVICE — GLOVE SRG BIOGEL ECLIPSE 7.5

## (undated) DEVICE — CATH URETERAL 5FR X 70 CM FLEX TIP POLYUR BARD

## (undated) DEVICE — STERILE SURGICAL LUBRICANT,  TUBE: Brand: SURGILUBE

## (undated) DEVICE — SPECIMEN CONTAINER STERILE PEEL PACK

## (undated) DEVICE — CHLORHEXIDINE 4PCT 4 OZ

## (undated) DEVICE — CATH FOLEY 20FR 5ML 2 WAY SILICONE ELASTIMER

## (undated) DEVICE — ENDOSCOPIC VALVE WITH ADAPTER.: Brand: SURSEAL® II

## (undated) DEVICE — GLOVE INDICATOR PI UNDERGLOVE SZ 8 BLUE

## (undated) DEVICE — DENTAL BURR SIDE WHITE

## (undated) DEVICE — 3M™ TEGADERM™ CHG DRESSING 25/CARTON 4 CARTONS/CASE 1659: Brand: TEGADERM™

## (undated) DEVICE — 200 MICRON SINGLE-USE HOLMIUM FIBER ASSEMBLY WITH FLAT TIP: Brand: OPTILITE

## (undated) DEVICE — SYRINGE 50ML LL

## (undated) DEVICE — 3M™ TEGADERM™ TRANSPARENT FILM DRESSING FRAME STYLE, 1622W, 1-3/4 IN X 1-3/4 IN (4.4 CM X 4.4 CM), 100/CT 4CT/CASE: Brand: 3M™ TEGADERM™

## (undated) DEVICE — BASKET SPECIMEN RETRIVAL 1.9FR 120CM

## (undated) DEVICE — SHEATH URETERAL ACCESS 11/13FR 45CM PROXIS

## (undated) DEVICE — PREMIUM DRY TRAY LF: Brand: MEDLINE INDUSTRIES, INC.

## (undated) DEVICE — UROCATCH BAG

## (undated) DEVICE — INTENDED FOR TISSUE SEPARATION, AND OTHER PROCEDURES THAT REQUIRE A SHARP SURGICAL BLADE TO PUNCTURE OR CUT.: Brand: BARD-PARKER SAFETY BLADES SIZE 15, STERILE

## (undated) DEVICE — MANDIBLE PACK: Brand: CARDINAL HEALTH